# Patient Record
Sex: MALE | Race: WHITE | NOT HISPANIC OR LATINO | URBAN - METROPOLITAN AREA
[De-identification: names, ages, dates, MRNs, and addresses within clinical notes are randomized per-mention and may not be internally consistent; named-entity substitution may affect disease eponyms.]

---

## 2017-03-20 ENCOUNTER — OFFICE VISIT - RIVER FALLS (OUTPATIENT)
Dept: FAMILY MEDICINE | Facility: CLINIC | Age: 72
End: 2017-03-20

## 2017-03-20 ASSESSMENT — MIFFLIN-ST. JEOR: SCORE: 1485.61

## 2017-04-13 ENCOUNTER — OFFICE VISIT - RIVER FALLS (OUTPATIENT)
Dept: FAMILY MEDICINE | Facility: CLINIC | Age: 72
End: 2017-04-13

## 2017-04-13 ASSESSMENT — MIFFLIN-ST. JEOR: SCORE: 1481.08

## 2017-06-08 ENCOUNTER — OFFICE VISIT - RIVER FALLS (OUTPATIENT)
Dept: FAMILY MEDICINE | Facility: CLINIC | Age: 72
End: 2017-06-08

## 2017-06-08 ASSESSMENT — MIFFLIN-ST. JEOR: SCORE: 1467.47

## 2017-08-31 ENCOUNTER — OFFICE VISIT - RIVER FALLS (OUTPATIENT)
Dept: FAMILY MEDICINE | Facility: CLINIC | Age: 72
End: 2017-08-31

## 2017-08-31 ASSESSMENT — MIFFLIN-ST. JEOR: SCORE: 1467.47

## 2017-09-30 ENCOUNTER — AMBULATORY - RIVER FALLS (OUTPATIENT)
Dept: FAMILY MEDICINE | Facility: CLINIC | Age: 72
End: 2017-09-30

## 2017-10-02 LAB
CHOLEST SERPL-MCNC: 163 MG/DL
CHOLEST/HDLC SERPL: 2.7 {RATIO}
CREAT SERPL-MCNC: 0.8 MG/DL (ref 0.7–1.18)
GLUCOSE BLD-MCNC: 109 MG/DL (ref 65–99)
HDLC SERPL-MCNC: 61 MG/DL
LDLC SERPL CALC-MCNC: 86 MG/DL
NONHDLC SERPL-MCNC: 102 MG/DL
PSA SERPL-MCNC: 0.4 NG/ML
TRIGL SERPL-MCNC: 73 MG/DL

## 2017-10-27 ENCOUNTER — OFFICE VISIT - RIVER FALLS (OUTPATIENT)
Dept: FAMILY MEDICINE | Facility: CLINIC | Age: 72
End: 2017-10-27

## 2017-10-27 ASSESSMENT — MIFFLIN-ST. JEOR: SCORE: 1481.08

## 2017-11-07 ENCOUNTER — OFFICE VISIT - RIVER FALLS (OUTPATIENT)
Dept: FAMILY MEDICINE | Facility: CLINIC | Age: 72
End: 2017-11-07

## 2017-11-07 ASSESSMENT — MIFFLIN-ST. JEOR: SCORE: 1482.89

## 2017-11-27 ENCOUNTER — OFFICE VISIT - RIVER FALLS (OUTPATIENT)
Dept: FAMILY MEDICINE | Facility: CLINIC | Age: 72
End: 2017-11-27

## 2017-11-27 ASSESSMENT — MIFFLIN-ST. JEOR: SCORE: 1476.54

## 2018-01-25 ENCOUNTER — OFFICE VISIT - RIVER FALLS (OUTPATIENT)
Dept: FAMILY MEDICINE | Facility: CLINIC | Age: 73
End: 2018-01-25

## 2018-01-25 ASSESSMENT — MIFFLIN-ST. JEOR: SCORE: 1494.69

## 2018-03-23 ENCOUNTER — OFFICE VISIT - RIVER FALLS (OUTPATIENT)
Dept: FAMILY MEDICINE | Facility: CLINIC | Age: 73
End: 2018-03-23

## 2018-05-24 ENCOUNTER — OFFICE VISIT - RIVER FALLS (OUTPATIENT)
Dept: FAMILY MEDICINE | Facility: CLINIC | Age: 73
End: 2018-05-24

## 2018-05-24 ASSESSMENT — MIFFLIN-ST. JEOR: SCORE: 1476.54

## 2018-06-02 ENCOUNTER — COMMUNICATION - RIVER FALLS (OUTPATIENT)
Dept: FAMILY MEDICINE | Facility: CLINIC | Age: 73
End: 2018-06-02

## 2018-06-02 ENCOUNTER — AMBULATORY - RIVER FALLS (OUTPATIENT)
Dept: FAMILY MEDICINE | Facility: CLINIC | Age: 73
End: 2018-06-02

## 2018-06-03 LAB
CHOLEST SERPL-MCNC: 159 MG/DL
CHOLEST/HDLC SERPL: 3.4 {RATIO}
CREAT SERPL-MCNC: 0.76 MG/DL (ref 0.7–1.18)
GLUCOSE BLD-MCNC: 102 MG/DL (ref 65–99)
HDLC SERPL-MCNC: 47 MG/DL
LDLC SERPL CALC-MCNC: 91 MG/DL
NONHDLC SERPL-MCNC: 112 MG/DL
PSA SERPL-MCNC: 0.4 NG/ML
TRIGL SERPL-MCNC: 112 MG/DL

## 2018-06-08 ENCOUNTER — OFFICE VISIT - RIVER FALLS (OUTPATIENT)
Dept: FAMILY MEDICINE | Facility: CLINIC | Age: 73
End: 2018-06-08

## 2018-06-08 ASSESSMENT — MIFFLIN-ST. JEOR: SCORE: 1467.47

## 2018-08-21 ENCOUNTER — OFFICE VISIT - RIVER FALLS (OUTPATIENT)
Dept: FAMILY MEDICINE | Facility: CLINIC | Age: 73
End: 2018-08-21

## 2018-08-21 ASSESSMENT — MIFFLIN-ST. JEOR: SCORE: 1472.01

## 2018-10-16 ENCOUNTER — OFFICE VISIT - RIVER FALLS (OUTPATIENT)
Dept: FAMILY MEDICINE | Facility: CLINIC | Age: 73
End: 2018-10-16

## 2018-10-16 ASSESSMENT — MIFFLIN-ST. JEOR: SCORE: 1449.33

## 2018-12-14 ENCOUNTER — OFFICE VISIT - RIVER FALLS (OUTPATIENT)
Dept: FAMILY MEDICINE | Facility: CLINIC | Age: 73
End: 2018-12-14

## 2018-12-14 ASSESSMENT — MIFFLIN-ST. JEOR: SCORE: 1472.01

## 2019-03-19 ENCOUNTER — OFFICE VISIT - RIVER FALLS (OUTPATIENT)
Dept: FAMILY MEDICINE | Facility: CLINIC | Age: 74
End: 2019-03-19

## 2019-03-19 ASSESSMENT — MIFFLIN-ST. JEOR: SCORE: 1453.86

## 2019-05-13 ENCOUNTER — COMMUNICATION - RIVER FALLS (OUTPATIENT)
Dept: FAMILY MEDICINE | Facility: CLINIC | Age: 74
End: 2019-05-13

## 2019-06-18 ENCOUNTER — OFFICE VISIT - RIVER FALLS (OUTPATIENT)
Dept: FAMILY MEDICINE | Facility: CLINIC | Age: 74
End: 2019-06-18

## 2019-06-18 ASSESSMENT — MIFFLIN-ST. JEOR: SCORE: 1444.79

## 2019-06-20 ENCOUNTER — COMMUNICATION - RIVER FALLS (OUTPATIENT)
Dept: FAMILY MEDICINE | Facility: CLINIC | Age: 74
End: 2019-06-20

## 2019-06-20 LAB
AMPHETAMINE - HISTORICAL: ABNORMAL
AMPHETAMINES UR QL: NEGATIVE NG/ML
BARBITURATE URINE - HISTORICAL: ABNORMAL
BARBITURATES UR QL SCN: NEGATIVE NG/ML
BENZODIAZ UR QL SCN: NEGATIVE NG/ML
BENZODIAZEPINES: ABNORMAL
COCAINE METAB URINE - HISTORICAL: NEGATIVE NG/ML
COCAINE METABOLITE: ABNORMAL
CODEINE CONFIRM URINE: NEGATIVE NG/ML
CODEINE: ABNORMAL
COMMENTS: ABNORMAL
CREAT UR-MCNC: 180.5 MG/DL
HYDROCODONE - HISTORICAL: ABNORMAL
HYDROCODONE UR CFM-MCNC: 1886 NG/ML
HYDROMORPHONE UR QL: 831 NG/ML
HYDROMORPHONE: ABNORMAL
METHADONE URINE - HISTORICAL: NEGATIVE NG/ML
METHADONE: ABNORMAL
MORPHINE URINE: NEGATIVE NG/ML
MORPHINE: ABNORMAL
NORHYDROCODONE - HISTORICAL: 3555 NG/ML
NORHYDROCODONE - HISTORICAL: ABNORMAL
OPIATES UR QL SCN: POSITIVE NG/ML
OXIDANTS URINE: NEGATIVE MCG/ML
OXYCODONE URINE - HISTORICAL: NEGATIVE NG/ML
OXYCODONE: ABNORMAL
PCP (PHENCYCLIDINE) - HISTORICAL: ABNORMAL
PCP UR QL SCN: NEGATIVE NG/ML
PH UR STRIP: 5.2 [PH] (ref 4.5–9)
THC 50 URINE - HISTORICAL: NEGATIVE NG/ML
THC METABOLITE: ABNORMAL

## 2019-08-15 ENCOUNTER — OFFICE VISIT - RIVER FALLS (OUTPATIENT)
Dept: FAMILY MEDICINE | Facility: CLINIC | Age: 74
End: 2019-08-15

## 2019-08-23 ENCOUNTER — AMBULATORY - RIVER FALLS (OUTPATIENT)
Dept: FAMILY MEDICINE | Facility: CLINIC | Age: 74
End: 2019-08-23

## 2019-08-24 LAB
A/G RATIO - HISTORICAL: 1.4 (ref 1–2.5)
ALBUMIN SERPL-MCNC: 4 GM/DL (ref 3.6–5.1)
ALP SERPL-CCNC: 83 UNIT/L (ref 40–115)
ALT SERPL W P-5'-P-CCNC: 14 UNIT/L (ref 9–46)
AST SERPL W P-5'-P-CCNC: 22 UNIT/L (ref 10–35)
BILIRUB SERPL-MCNC: 0.9 MG/DL (ref 0.2–1.2)
BUN SERPL-MCNC: 13 MG/DL (ref 7–25)
BUN/CREAT RATIO - HISTORICAL: ABNORMAL (ref 6–22)
CALCIUM SERPL-MCNC: 9.6 MG/DL (ref 8.6–10.3)
CHLORIDE BLD-SCNC: 102 MMOL/L (ref 98–110)
CHOLEST SERPL-MCNC: 172 MG/DL
CHOLEST/HDLC SERPL: 2.7 {RATIO}
CO2 SERPL-SCNC: 31 MMOL/L (ref 20–32)
CREAT SERPL-MCNC: 0.86 MG/DL (ref 0.7–1.18)
EGFRCR SERPLBLD CKD-EPI 2021: 87 ML/MIN/1.73M2
ERYTHROCYTE [DISTWIDTH] IN BLOOD BY AUTOMATED COUNT: 13.6 % (ref 11–15)
FERRITIN SERPL-MCNC: 21 NG/ML (ref 24–380)
GLOBULIN: 2.9 (ref 1.9–3.7)
GLUCOSE BLD-MCNC: 103 MG/DL (ref 65–99)
HCT VFR BLD AUTO: 42.1 % (ref 38.5–50)
HDLC SERPL-MCNC: 63 MG/DL
HGB BLD-MCNC: 13.6 GM/DL (ref 13.2–17.1)
LDLC SERPL CALC-MCNC: 93 MG/DL
MCH RBC QN AUTO: 27 PG (ref 27–33)
MCHC RBC AUTO-ENTMCNC: 32.3 GM/DL (ref 32–36)
MCV RBC AUTO: 83.7 FL (ref 80–100)
NONHDLC SERPL-MCNC: 109 MG/DL
PLATELET # BLD AUTO: 285 10*3/UL (ref 140–400)
PMV BLD: 9.5 FL (ref 7.5–12.5)
POTASSIUM BLD-SCNC: 4.9 MMOL/L (ref 3.5–5.3)
PROT SERPL-MCNC: 6.9 GM/DL (ref 6.1–8.1)
PSA SERPL-MCNC: 0.4 NG/ML
RBC # BLD AUTO: 5.03 10*6/UL (ref 4.2–5.8)
SODIUM SERPL-SCNC: 139 MMOL/L (ref 135–146)
TRIGL SERPL-MCNC: 70 MG/DL
TSH SERPL DL<=0.005 MIU/L-ACNC: 2.14 MIU/L (ref 0.4–4.5)
WBC # BLD AUTO: 4.8 10*3/UL (ref 3.8–10.8)

## 2019-08-25 ENCOUNTER — COMMUNICATION - RIVER FALLS (OUTPATIENT)
Dept: FAMILY MEDICINE | Facility: CLINIC | Age: 74
End: 2019-08-25

## 2019-10-10 ENCOUNTER — OFFICE VISIT - RIVER FALLS (OUTPATIENT)
Dept: FAMILY MEDICINE | Facility: CLINIC | Age: 74
End: 2019-10-10

## 2019-10-10 ASSESSMENT — MIFFLIN-ST. JEOR: SCORE: 1440.25

## 2019-11-04 ENCOUNTER — OFFICE VISIT - RIVER FALLS (OUTPATIENT)
Dept: FAMILY MEDICINE | Facility: CLINIC | Age: 74
End: 2019-11-04

## 2019-11-04 ASSESSMENT — MIFFLIN-ST. JEOR: SCORE: 1449.33

## 2019-12-12 ENCOUNTER — OFFICE VISIT - RIVER FALLS (OUTPATIENT)
Dept: FAMILY MEDICINE | Facility: CLINIC | Age: 74
End: 2019-12-12

## 2019-12-12 ASSESSMENT — MIFFLIN-ST. JEOR: SCORE: 1458.4

## 2020-02-06 ENCOUNTER — OFFICE VISIT - RIVER FALLS (OUTPATIENT)
Dept: FAMILY MEDICINE | Facility: CLINIC | Age: 75
End: 2020-02-06

## 2020-02-06 ASSESSMENT — MIFFLIN-ST. JEOR: SCORE: 1472.37

## 2020-04-07 ENCOUNTER — OFFICE VISIT - RIVER FALLS (OUTPATIENT)
Dept: FAMILY MEDICINE | Facility: CLINIC | Age: 75
End: 2020-04-07

## 2020-06-11 ENCOUNTER — OFFICE VISIT - RIVER FALLS (OUTPATIENT)
Dept: FAMILY MEDICINE | Facility: CLINIC | Age: 75
End: 2020-06-11

## 2020-06-11 ENCOUNTER — AMBULATORY - RIVER FALLS (OUTPATIENT)
Dept: FAMILY MEDICINE | Facility: CLINIC | Age: 75
End: 2020-06-11

## 2020-06-11 ASSESSMENT — MIFFLIN-ST. JEOR: SCORE: 1477.45

## 2020-06-12 ENCOUNTER — COMMUNICATION - RIVER FALLS (OUTPATIENT)
Dept: FAMILY MEDICINE | Facility: CLINIC | Age: 75
End: 2020-06-12

## 2020-06-12 LAB
A/G RATIO - HISTORICAL: 1.1 (ref 1–2.5)
ALBUMIN SERPL-MCNC: 3.9 GM/DL (ref 3.6–5.1)
ALP SERPL-CCNC: 97 UNIT/L (ref 35–144)
ALT SERPL W P-5'-P-CCNC: 12 UNIT/L (ref 9–46)
AST SERPL W P-5'-P-CCNC: 17 UNIT/L (ref 10–35)
BILIRUB SERPL-MCNC: 0.8 MG/DL (ref 0.2–1.2)
BUN SERPL-MCNC: 14 MG/DL (ref 7–25)
BUN/CREAT RATIO - HISTORICAL: ABNORMAL (ref 6–22)
CALCIUM SERPL-MCNC: 9.7 MG/DL (ref 8.6–10.3)
CHLORIDE BLD-SCNC: 101 MMOL/L (ref 98–110)
CHOLEST SERPL-MCNC: 180 MG/DL
CHOLEST/HDLC SERPL: 3 {RATIO}
CO2 SERPL-SCNC: 30 MMOL/L (ref 20–32)
CREAT SERPL-MCNC: 0.79 MG/DL (ref 0.7–1.18)
EGFRCR SERPLBLD CKD-EPI 2021: 90 ML/MIN/1.73M2
ERYTHROCYTE [DISTWIDTH] IN BLOOD BY AUTOMATED COUNT: 12.7 % (ref 11–15)
FERRITIN SERPL-MCNC: 23 NG/ML (ref 24–380)
GLOBULIN: 3.4 (ref 1.9–3.7)
GLUCOSE BLD-MCNC: 131 MG/DL (ref 65–99)
HCT VFR BLD AUTO: 43 % (ref 38.5–50)
HDLC SERPL-MCNC: 61 MG/DL
HGB BLD-MCNC: 14 GM/DL (ref 13.2–17.1)
LDLC SERPL CALC-MCNC: 95 MG/DL
MCH RBC QN AUTO: 28.2 PG (ref 27–33)
MCHC RBC AUTO-ENTMCNC: 32.6 GM/DL (ref 32–36)
MCV RBC AUTO: 86.5 FL (ref 80–100)
NONHDLC SERPL-MCNC: 119 MG/DL
PLATELET # BLD AUTO: 309 10*3/UL (ref 140–400)
PMV BLD: 9.1 FL (ref 7.5–12.5)
POTASSIUM BLD-SCNC: 4.2 MMOL/L (ref 3.5–5.3)
PROT SERPL-MCNC: 7.3 GM/DL (ref 6.1–8.1)
PSA SERPL-MCNC: 0.3 NG/ML
RBC # BLD AUTO: 4.97 10*6/UL (ref 4.2–5.8)
SODIUM SERPL-SCNC: 138 MMOL/L (ref 135–146)
TRIGL SERPL-MCNC: 139 MG/DL
TSH SERPL DL<=0.005 MIU/L-ACNC: 1.25 MIU/L (ref 0.4–4.5)
WBC # BLD AUTO: 5.3 10*3/UL (ref 3.8–10.8)

## 2020-07-17 ENCOUNTER — OFFICE VISIT - RIVER FALLS (OUTPATIENT)
Dept: FAMILY MEDICINE | Facility: CLINIC | Age: 75
End: 2020-07-17

## 2020-07-21 ENCOUNTER — AMBULATORY - HEALTHEAST (OUTPATIENT)
Dept: SURGERY | Facility: TELEHEALTH | Age: 75
End: 2020-07-21

## 2020-07-21 DIAGNOSIS — K40.90 INGUINAL HERNIA, RIGHT: ICD-10-CM

## 2020-07-22 ENCOUNTER — OFFICE VISIT - RIVER FALLS (OUTPATIENT)
Dept: FAMILY MEDICINE | Facility: CLINIC | Age: 75
End: 2020-07-22

## 2020-07-22 ENCOUNTER — AMBULATORY - RIVER FALLS (OUTPATIENT)
Dept: FAMILY MEDICINE | Facility: CLINIC | Age: 75
End: 2020-07-22

## 2020-07-30 RX ORDER — ESCITALOPRAM OXALATE 10 MG/1
10 TABLET ORAL DAILY
Status: SHIPPED | COMMUNITY
Start: 2020-07-30

## 2020-07-30 RX ORDER — HYDROCODONE BITARTRATE AND ACETAMINOPHEN 5; 325 MG/1; MG/1
1 TABLET ORAL EVERY 4 HOURS PRN
Status: SHIPPED | COMMUNITY
Start: 2020-07-30

## 2020-07-30 RX ORDER — FERROUS SULFATE 325(65) MG
1 TABLET ORAL
Status: SHIPPED | COMMUNITY
Start: 2020-07-30

## 2020-07-30 RX ORDER — ELETRIPTAN HYDROBROMIDE 40 MG/1
40 TABLET, FILM COATED ORAL PRN
Status: SHIPPED | COMMUNITY
Start: 2020-07-30

## 2020-08-03 ENCOUNTER — OFFICE VISIT - HEALTHEAST (OUTPATIENT)
Dept: SURGERY | Facility: CLINIC | Age: 75
End: 2020-08-03

## 2020-08-03 ENCOUNTER — SURGERY - HEALTHEAST (OUTPATIENT)
Dept: SURGERY | Facility: CLINIC | Age: 75
End: 2020-08-03

## 2020-08-03 ENCOUNTER — AMBULATORY - HEALTHEAST (OUTPATIENT)
Dept: SURGERY | Facility: CLINIC | Age: 75
End: 2020-08-03

## 2020-08-03 DIAGNOSIS — K40.91 UNILATERAL RECURRENT INGUINAL HERNIA WITHOUT OBSTRUCTION OR GANGRENE: ICD-10-CM

## 2020-08-03 DIAGNOSIS — Z11.59 ENCOUNTER FOR SCREENING FOR OTHER VIRAL DISEASES: ICD-10-CM

## 2020-08-03 DIAGNOSIS — K40.90 UNILATERAL INGUINAL HERNIA WITHOUT OBSTRUCTION OR GANGRENE, RECURRENCE NOT SPECIFIED: ICD-10-CM

## 2020-08-04 LAB — SARS-COV-2 RNA SPEC QL NAA+PROBE: NOT DETECTED

## 2020-08-05 ENCOUNTER — OFFICE VISIT - RIVER FALLS (OUTPATIENT)
Dept: FAMILY MEDICINE | Facility: CLINIC | Age: 75
End: 2020-08-05

## 2020-08-05 ASSESSMENT — MIFFLIN-ST. JEOR
SCORE: 1462.49
SCORE: 1457.49

## 2020-08-10 ENCOUNTER — COMMUNICATION - RIVER FALLS (OUTPATIENT)
Dept: FAMILY MEDICINE | Facility: CLINIC | Age: 75
End: 2020-08-10

## 2020-08-10 ENCOUNTER — AMBULATORY - HEALTHEAST (OUTPATIENT)
Dept: FAMILY MEDICINE | Facility: CLINIC | Age: 75
End: 2020-08-10

## 2020-08-10 DIAGNOSIS — Z11.59 ENCOUNTER FOR SCREENING FOR OTHER VIRAL DISEASES: ICD-10-CM

## 2020-08-12 ENCOUNTER — COMMUNICATION - HEALTHEAST (OUTPATIENT)
Dept: SCHEDULING | Facility: CLINIC | Age: 75
End: 2020-08-12

## 2020-08-12 ENCOUNTER — ANESTHESIA - HEALTHEAST (OUTPATIENT)
Dept: SURGERY | Facility: HOSPITAL | Age: 75
End: 2020-08-12

## 2020-08-13 ENCOUNTER — SURGERY - HEALTHEAST (OUTPATIENT)
Dept: SURGERY | Facility: HOSPITAL | Age: 75
End: 2020-08-13

## 2020-08-27 ENCOUNTER — OFFICE VISIT - HEALTHEAST (OUTPATIENT)
Dept: SURGERY | Facility: CLINIC | Age: 75
End: 2020-08-27

## 2020-08-27 DIAGNOSIS — K40.90 UNILATERAL INGUINAL HERNIA WITHOUT OBSTRUCTION OR GANGRENE, RECURRENCE NOT SPECIFIED: ICD-10-CM

## 2020-09-10 ENCOUNTER — COMMUNICATION - RIVER FALLS (OUTPATIENT)
Dept: FAMILY MEDICINE | Facility: CLINIC | Age: 75
End: 2020-09-10

## 2020-10-09 ENCOUNTER — OFFICE VISIT - RIVER FALLS (OUTPATIENT)
Dept: FAMILY MEDICINE | Facility: CLINIC | Age: 75
End: 2020-10-09

## 2020-10-23 ENCOUNTER — AMBULATORY - RIVER FALLS (OUTPATIENT)
Dept: FAMILY MEDICINE | Facility: CLINIC | Age: 75
End: 2020-10-23

## 2020-12-04 ENCOUNTER — OFFICE VISIT - RIVER FALLS (OUTPATIENT)
Dept: FAMILY MEDICINE | Facility: CLINIC | Age: 75
End: 2020-12-04

## 2020-12-04 ENCOUNTER — TRANSFERRED RECORDS (OUTPATIENT)
Dept: MULTI SPECIALTY CLINIC | Facility: CLINIC | Age: 75
End: 2020-12-04

## 2020-12-04 ASSESSMENT — MIFFLIN-ST. JEOR: SCORE: 1467.47

## 2020-12-06 LAB
CELIAC DISEASE DUAL AG SCREEN: NORMAL
GLUCOSE BLD-MCNC: 108 MG/DL (ref 65–99)
HBA1C MFR BLD: 5.5 %
IMMUNOGLOBULIN A: 249 MG/DL (ref 70–320)
TISSUE TRANSGLUTAMINASE IGA - HISTORICAL: 1 UNIT/ML

## 2020-12-07 ENCOUNTER — COMMUNICATION - RIVER FALLS (OUTPATIENT)
Dept: FAMILY MEDICINE | Facility: CLINIC | Age: 75
End: 2020-12-07

## 2021-02-22 ENCOUNTER — OFFICE VISIT - RIVER FALLS (OUTPATIENT)
Dept: FAMILY MEDICINE | Facility: CLINIC | Age: 76
End: 2021-02-22

## 2021-02-22 ASSESSMENT — MIFFLIN-ST. JEOR: SCORE: 1512.83

## 2021-05-25 ENCOUNTER — OFFICE VISIT - HEALTHEAST (OUTPATIENT)
Dept: BEHAVIORAL HEALTH | Facility: CLINIC | Age: 76
End: 2021-05-25

## 2021-05-25 DIAGNOSIS — F43.21 ADJUSTMENT DISORDER WITH DEPRESSED MOOD: ICD-10-CM

## 2021-05-25 ASSESSMENT — PATIENT HEALTH QUESTIONNAIRE - PHQ9: SUM OF ALL RESPONSES TO PHQ QUESTIONS 1-9: 4

## 2021-06-01 ENCOUNTER — OFFICE VISIT - RIVER FALLS (OUTPATIENT)
Dept: FAMILY MEDICINE | Facility: CLINIC | Age: 76
End: 2021-06-01

## 2021-06-01 ASSESSMENT — MIFFLIN-ST. JEOR: SCORE: 1467.02

## 2021-06-04 VITALS — WEIGHT: 156 LBS

## 2021-06-04 VITALS — WEIGHT: 156.8 LBS | HEIGHT: 70 IN | BODY MASS INDEX: 22.45 KG/M2

## 2021-06-10 NOTE — PROGRESS NOTES
HPI: Pt is here for follow up s/p HERNIORRHAPHY, INGUINAL, ROBOT-ASSISTED, LAPAROSCOPIC, USING DA GISSELLE XI- right  with Dr. Stone on 8/13/2020.   he is doing well.  Pain is well controlled:  Yes. No difficulties with the surgical wound/wounds.  he is eating well and denies fever and chills.        There were no vitals taken for this visit.    EXAM:  GENERAL:Appears well  ABDOMEN:  Soft, +BS  SURGICAL WOUNDS:  Incisions healing well, no induration or drainage.      Assessment/Plan: Doing well after surgery and should follow up as needed.    Jovany Moya PA-C  [phone]  General Surgery

## 2021-06-10 NOTE — ANESTHESIA PREPROCEDURE EVALUATION
Anesthesia Evaluation      Patient summary reviewed   No history of anesthetic complications     Airway   Mallampati: I  Neck ROM: full   Pulmonary - normal exam   (+) asthma  sleep apnea on CPAP, ,                          Cardiovascular - negative ROS and normal exam  Exercise tolerance: > or = 4 METS   Neuro/Psych    (+) neuromuscular disease,      Endo/Other       GI/Hepatic/Renal    (+) GERD intermittent,        Other findings: DAISY, has CPAP but does not always use it. H/o RAD, chronic rhinitis.  Fibromyalgia.  Spinal fusion, HAs, varicose veins, remote PUD (NSAID induced), IBS, BPH.  Covid negative 8/10/20.        Dental - normal exam                        Anesthesia Plan  Planned anesthetic: general endotracheal  Ketamine after induction, toradol if ok with surgeon.  ASA 3   Induction: intravenous   Anesthetic plan and risks discussed with: patient  Anesthesia plan special considerations: antiemetics,   Post-op plan: routine recovery

## 2021-06-10 NOTE — ANESTHESIA CARE TRANSFER NOTE
Last vitals:   Vitals:    08/13/20 1720   BP: 162/86   Pulse: 82   Resp: 15   Temp: 37.4  C (99.3  F)   SpO2: 97%     Patient's level of consciousness is drowsy  Spontaneous respirations: yes  Maintains airway independently: yes  Dentition unchanged: yes  Oropharynx: oropharynx clear of all foreign objects    QCDR Measures:  ASA# 20 - Surgical Safety Checklist: WHO surgical safety checklist completed prior to induction    PQRS# 430 - Adult PONV Prevention: 4558F - Pt received => 2 anti-emetic agents (different classes) preop & intraop  ASA# 8 - Peds PONV Prevention: NA - Not pediatric patient, not GA or 2 or more risk factors NOT present  PQRS# 424 - Griselda-op Temp Management: 4559F - At least one body temp DOCUMENTED => 35.5C or 95.9F within required timeframe  PQRS# 426 - PACU Transfer Protocol: - Transfer of care checklist used  ASA# 14 - Acute Post-op Pain: ASA14B - Patient did NOT experience pain >= 7 out of 10

## 2021-06-10 NOTE — PROGRESS NOTES
HPI:  Fer Prince is a 74 y.o. male who was referred to me by Alexander Higginbotham MD for an inguinal hernia. He  presents today with complaints of a right inguinal bulge for 6 months.  He is noted that the bulge began after heavy lifting at work as he is a .  He denies any fevers, chills, nausea or vomiting.    Allergies:Amitriptyline; Indomethacin; and Paroxetine    Past Medical History:   Diagnosis Date     Sleep apnea     Uses CPAP       Past Surgical History:   Procedure Laterality Date     CHOLECYSTECTOMY  2002     COLONOSCOPY  2004, 2009     ESOPHAGOGASTRODUODENOSCOPY  1989     SPINAL FUSION  1995       CURRENT MEDS:  Current Outpatient Medications   Medication Sig Dispense Refill     eletriptan (RELPAX) 40 MG tablet Take 40 mg by mouth as needed. may repeat in 2 hours if necessary       escitalopram oxalate (LEXAPRO) 10 MG tablet Take 10 mg by mouth daily.       ferrous sulfate 325 (65 FE) MG tablet Take 1 tablet by mouth daily with breakfast.       HYDROcodone-acetaminophen 5-325 mg per tablet Take 1 tablet by mouth every 4 (four) hours as needed.       multivitamin (ONE A DAY) per tablet Take by mouth.       triamcinolone (NASACORT) 55 mcg nasal inhaler Apply 2 sprays into each nostril daily.       No current facility-administered medications for this visit.        Family history-reviewed and noncontributory to the current admission     reports that he has never smoked. He has never used smokeless tobacco. He reports current alcohol use.    Review of Systems -   The 12 system review is within normal limits except for as mentioned above.  General ROS: No complaints or constitutional symptoms  Ophthalmic ROS: No complaints of visual changes  Skin: No complaints or symptoms   Endocrine: No complaints or symptoms  Hematologic/Lymphatic: No symptoms or complaints  Psychiatric: No symptoms or complaints  Respiratory ROS: no cough, shortness of breath, or wheezing  Cardiovascular ROS: no chest pain or  dyspnea on exertion  Gastrointestinal ROS: As per HPI  Genito-Urinary ROS: no dysuria, trouble voiding, or hematuria  Musculoskeletal ROS: no joint or muscle pain  Neurological ROS: no TIA or stroke symptoms    Wt 156 lb (70.8 kg)   There is no height or weight on file to calculate BMI.    EXAM:  GENERAL: Well developed male, No acute distress, pleasant and conversant   EYES: Pupils equal, round and reactive, no scleral icterus  CARDIAC: Regular rate and rhythm, no obvious murmurs noted  CHEST/LUNG: Clear to ascultation bilaterally, No ronchi, No wheezes  ABDOMEN: Soft, palpable right inguinal hernia reducible, no evidence of a left inguinal hernia  SKIN: Pink, warm and dry, no obvious rashes or lesions   NEURO:No focal deficits, ambulatory  MUSCULOSKELETAL:No obvious deformities, no swelling, normal appearing        Assessment/Plan:   Fer Prince is a 74 y.o. male with a right inguinal hernia.  I have discussed the pathophysiology of inguinal hernias at length as well as the  surgical and non-operative management strategies.      The risks of Robotic, Laparoscopic and open inguinal hernia  surgery were explained in detail which include, but are not limited to, bleeding, infection of the mesh, recurrence of the hernia, chronic pain, poor cosmesis, the need for reoperative intervention, the possibility of conversion from a laparoscopic approach to an open approach, subcutaneous emphysema, injury to vital structures,  blood clots, heart attack, stroke and death.  Additionally, the risks of observation were also discussed in detail which include, but are not limited to, chronic pain, enlargement of the hernia, incarceration, strangulation and death.      He understands everything that was discussed and has consented to proceed with surgery.   We will plan on scheduling a robotic right possible left inguinal hernia repair at his desired date.       Rich Stone D.O. [facility]  [phone]  [facility] Department of Surgery

## 2021-06-10 NOTE — ANESTHESIA POSTPROCEDURE EVALUATION
Patient: Fer Prince  Procedure(s):  HERNIORRHAPHY, INGUINAL, ROBOT-ASSISTED, LAPAROSCOPIC, USING DA GISSELLE XI- right (Right)  Anesthesia type: general    Patient location: PACU  Last vitals:   Vitals Value Taken Time   /95 8/13/2020  6:00 PM   Temp 37.4  C (99.3  F) 8/13/2020  5:20 PM   Pulse 78 8/13/2020  6:02 PM   Resp 36 8/13/2020  5:48 PM   SpO2 99 % 8/13/2020  6:02 PM   Vitals shown include unvalidated device data.  Post vital signs: stable  Level of consciousness: very alert, conversing and soon is getting dressed to leave.  Post-anesthesia pain: pain controlled  Post-anesthesia nausea and vomiting: no  Pulmonary: room air  Cardiovascular: stable and blood pressure at baseline  Hydration: adequate  Anesthetic events: no    QCDR Measures:  ASA# 11 - Griselda-op Cardiac Arrest: ASA11B - Patient did NOT experience unanticipated cardiac arrest  ASA# 12 - Griselda-op Mortality Rate: ASA12B - Patient did NOT die  ASA# 13 - PACU Re-Intubation Rate: ASA13B - Patient did NOT require a new airway mgmt  ASA# 10 - Composite Anes Safety: ASA10A - No serious adverse event    Additional Notes:

## 2021-06-16 PROBLEM — K40.90 UNILATERAL INGUINAL HERNIA WITHOUT OBSTRUCTION OR GANGRENE, RECURRENCE NOT SPECIFIED: Status: ACTIVE | Noted: 2020-08-03

## 2021-06-17 NOTE — PROGRESS NOTES
"Mental Health Psychotherapy Telephone Note    Patient: Fer Prince    : 1945 MRN: [id number]    Completed a 2 point identification verification: patient verbalized full name and date of birth.     Date: 21  Start time: 0800   Stop Time: 852   Session # 1    The patient has been notified of the following:   \"We have found that certain health care needs can be provided without the need for a face to face visit.  This service lets us provide the care you need with a phone conversation.  I will have full access to your [place] medical record during this entire phone call.   I will be taking notes for your medical record. Since this is like an office visit, we will bill your insurance company for this service.  There are potential benefits and risks of telephone visits (e.g. limits to patient confidentiality) that differ from in-person visits.?  Confidentiality still applies for telephone services, and nobody will record the visit.  It is important to be in a quiet, private space that is free of distractions (including cell phone or other devices) during the visit.?? If during the course of the call I believe a telephone visit is not appropriate, you will not be charged for this service\"  Consent has been obtained for this service by care team member: Yes, per verbal agreement   Session Type: Patient is participating in a telemedicine phone visit     Those present for this session: (Patient and therapist)      No chief complaint on file.    Patient is a 75-year-old man who was self-referred for psychotherapy.  Patient indicated his appointment was originally set up on 2021 although, was told there was no availability until 2021.  Patient stated, that his wife  of complications of cancer 2020.  They have been  for 49 years.  They have 5 children and 7 grandchildren.  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  He stated " "that he has deep face and has met on several occasions with a  from his [facility] as well as a grief counselor from the local  home.  His children have been very supportive and involved and concerned that he has additional support through the grieving process.    New symptoms or complaints: Grief, sadness, guilt, loneliness, uncertainty regarding future.    Functional Impairment:   Personal: 4  Family: 3  Work: 1  Social:3          ASSESSMENT: Current Emotional / Mental Status (status of significant symptoms):              Risk status (Self / Other harm or suicidal ideation)              Patient denies any risk issues               Patient denies current or recent suicidal ideation or behaviors.              Patient denies current or recent homicidal ideation or behaviors.              Patient denies current or recent self injurious behavior or ideation.              Patient denies other safety concerns.                        Patient               Recommended that patient call 911 or go to the local ED should there be a change in any of these risk factors.                Attitude:                                   Cooperative               Orientation:                             Oriented x3              Speech                                    Clear                           Rate / Production:       Normal/ Responsive Normal                           Volume:                       Normal               Mood:                                      Normal              Thought Content:                    Clear               Thought Form:                        Coherent  Logical               Insight:                                     Good     PHQ-9 scoring 4 indicating mild depression    Patient's impression of their current status: Patient stated, \"I am doing much better now than initially and I have good support in place.\"    Therapist impression of patients current state: The patient presented for an " initial session with provider.  Patient is a 75-year-old male who was self-referred referred to engage in individual psychotherapy to address symptoms of depression and grief and loss issues. Patient appeared cooperative and open-minded throughout the intake and easily engaged in dialogue.  Therapist and patient verbally reviewed consent to privacy policy. Patient reported understanding and provided verbal consent.  The patient has not engaged in outpatient psychotherapy services in the community so there is no diagnostic assessment on file available.  The patient completed the following questionnaires today PHQ-9,  No concerns about patient safety or the safety of others per assessment today.  Therapist will review patient's further history, mail out intake form and follow-up in order to complete a standard diagnostic assessment.  Goals for treatment will be established after the second or third follow-up session with this provider.    Type of psychotherapeutic technique provided: Insight oriented and Solution-focused    Progress toward short term goals: Not yet established    Review of long term goals: Not yet established     Diagnosis:  Anxiety disorder with Depressed mood    Plan and Follow up:   1.  Patient will continue maintaining contact with his [facility]   2.  Patient will continue obtaining counseling and guidance through the local [facility] home counselor.  3.  Maintain strong family support.  4.  Obtain additional grief and loss support information available through Internet and other resources.  5.  Patient will make contact with therapist if need arises in the future.    Discharge Criteria/Planning: Patient will continue with follow-up until therapy can be discontinued without return of signs and symptoms.    I have reviewed the note as documented above.  This accurately captures the substance of my conversation with the patient.  As the provider I attest to compliance with applicable laws and  regulations related to telemedicine.  Vikki Stein LIC  5/25/21

## 2021-06-20 NOTE — LETTER
Letter by Jovany Moya PA-C at      Author: Jovany Moya PA-C Service: -- Author Type: --    Filed:  Encounter Date: 8/27/2020 Status: (Other)       August 27, 2020     Patient: Fer Prince   YOB: 1945   Date of Visit: 8/27/2020       To Whom It May Concern:    It is my medical opinion that Fer Prince may return to work on 9/14/2020 with 20 pound lifting restriction. May resume normal work duties as tolerated on 9/28/2020. He may be [place] at anytime. .    If you have any questions or concerns, please don't hesitate to call.    Sincerely,        Jovany Moya PA-C

## 2021-07-06 ASSESSMENT — PATIENT HEALTH QUESTIONNAIRE - PHQ9: SUM OF ALL RESPONSES TO PHQ QUESTIONS 1-9: 4

## 2022-02-12 VITALS
TEMPERATURE: 97 F | HEART RATE: 76 BPM | HEIGHT: 70 IN | BODY MASS INDEX: 21.9 KG/M2 | TEMPERATURE: 96.7 F | DIASTOLIC BLOOD PRESSURE: 89 MMHG | WEIGHT: 155 LBS | SYSTOLIC BLOOD PRESSURE: 122 MMHG | WEIGHT: 153 LBS | HEIGHT: 70 IN | OXYGEN SATURATION: 98 % | BODY MASS INDEX: 22.19 KG/M2 | HEART RATE: 76 BPM | DIASTOLIC BLOOD PRESSURE: 60 MMHG | SYSTOLIC BLOOD PRESSURE: 158 MMHG

## 2022-02-12 VITALS
BODY MASS INDEX: 22.19 KG/M2 | DIASTOLIC BLOOD PRESSURE: 76 MMHG | WEIGHT: 155 LBS | HEIGHT: 70 IN | SYSTOLIC BLOOD PRESSURE: 132 MMHG | HEART RATE: 73 BPM

## 2022-02-12 VITALS
BODY MASS INDEX: 24.2 KG/M2 | HEART RATE: 77 BPM | SYSTOLIC BLOOD PRESSURE: 124 MMHG | DIASTOLIC BLOOD PRESSURE: 80 MMHG | HEIGHT: 70 IN | OXYGEN SATURATION: 96 % | WEIGHT: 169 LBS | TEMPERATURE: 97.6 F

## 2022-02-12 VITALS
HEIGHT: 70 IN | BODY MASS INDEX: 22.76 KG/M2 | HEIGHT: 70 IN | SYSTOLIC BLOOD PRESSURE: 135 MMHG | HEART RATE: 87 BPM | WEIGHT: 159 LBS | DIASTOLIC BLOOD PRESSURE: 86 MMHG | DIASTOLIC BLOOD PRESSURE: 80 MMHG | WEIGHT: 159 LBS | HEART RATE: 85 BPM | SYSTOLIC BLOOD PRESSURE: 124 MMHG | BODY MASS INDEX: 22.76 KG/M2

## 2022-02-12 VITALS
BODY MASS INDEX: 22.9 KG/M2 | WEIGHT: 160 LBS | HEIGHT: 70 IN | HEART RATE: 77 BPM | SYSTOLIC BLOOD PRESSURE: 134 MMHG | DIASTOLIC BLOOD PRESSURE: 77 MMHG

## 2022-02-12 VITALS
OXYGEN SATURATION: 98 % | DIASTOLIC BLOOD PRESSURE: 62 MMHG | TEMPERATURE: 97.5 F | TEMPERATURE: 97.9 F | HEART RATE: 82 BPM | WEIGHT: 157 LBS | BODY MASS INDEX: 22.48 KG/M2 | HEIGHT: 70 IN | HEART RATE: 69 BPM | SYSTOLIC BLOOD PRESSURE: 112 MMHG | WEIGHT: 160.08 LBS | BODY MASS INDEX: 22.92 KG/M2 | SYSTOLIC BLOOD PRESSURE: 124 MMHG | HEIGHT: 70 IN | DIASTOLIC BLOOD PRESSURE: 78 MMHG

## 2022-02-12 VITALS
HEART RATE: 69 BPM | WEIGHT: 165 LBS | SYSTOLIC BLOOD PRESSURE: 118 MMHG | HEIGHT: 70 IN | DIASTOLIC BLOOD PRESSURE: 74 MMHG | WEIGHT: 161 LBS | BODY MASS INDEX: 23.05 KG/M2 | SYSTOLIC BLOOD PRESSURE: 120 MMHG | TEMPERATURE: 97.2 F | HEART RATE: 88 BPM | BODY MASS INDEX: 23.68 KG/M2 | DIASTOLIC BLOOD PRESSURE: 69 MMHG

## 2022-02-12 VITALS
HEART RATE: 82 BPM | WEIGHT: 160 LBS | BODY MASS INDEX: 22.9 KG/M2 | WEIGHT: 159 LBS | SYSTOLIC BLOOD PRESSURE: 137 MMHG | HEIGHT: 70 IN | BODY MASS INDEX: 22.76 KG/M2 | DIASTOLIC BLOOD PRESSURE: 82 MMHG | HEIGHT: 70 IN | HEART RATE: 71 BPM | DIASTOLIC BLOOD PRESSURE: 78 MMHG | SYSTOLIC BLOOD PRESSURE: 125 MMHG

## 2022-02-12 VITALS
WEIGHT: 162 LBS | BODY MASS INDEX: 23.19 KG/M2 | DIASTOLIC BLOOD PRESSURE: 80 MMHG | BODY MASS INDEX: 23.05 KG/M2 | WEIGHT: 162.4 LBS | HEIGHT: 70 IN | HEART RATE: 85 BPM | HEIGHT: 70 IN | DIASTOLIC BLOOD PRESSURE: 86 MMHG | SYSTOLIC BLOOD PRESSURE: 122 MMHG | SYSTOLIC BLOOD PRESSURE: 151 MMHG | HEIGHT: 70 IN | HEART RATE: 82 BPM | TEMPERATURE: 97.7 F | DIASTOLIC BLOOD PRESSURE: 72 MMHG | BODY MASS INDEX: 23.25 KG/M2 | HEART RATE: 74 BPM | WEIGHT: 161 LBS | TEMPERATURE: 97 F | OXYGEN SATURATION: 97 % | SYSTOLIC BLOOD PRESSURE: 130 MMHG

## 2022-02-12 VITALS
HEART RATE: 77 BPM | SYSTOLIC BLOOD PRESSURE: 116 MMHG | BODY MASS INDEX: 22.33 KG/M2 | HEIGHT: 70 IN | DIASTOLIC BLOOD PRESSURE: 73 MMHG | WEIGHT: 156 LBS

## 2022-02-12 VITALS
SYSTOLIC BLOOD PRESSURE: 120 MMHG | BODY MASS INDEX: 22.1 KG/M2 | DIASTOLIC BLOOD PRESSURE: 70 MMHG | TEMPERATURE: 97.3 F | HEART RATE: 62 BPM | SYSTOLIC BLOOD PRESSURE: 122 MMHG | DIASTOLIC BLOOD PRESSURE: 70 MMHG | WEIGHT: 154 LBS | BODY MASS INDEX: 22.05 KG/M2 | HEIGHT: 70 IN | TEMPERATURE: 97.2 F | WEIGHT: 154 LBS | HEART RATE: 68 BPM

## 2022-02-12 VITALS
WEIGHT: 156.8 LBS | BODY MASS INDEX: 23.13 KG/M2 | DIASTOLIC BLOOD PRESSURE: 68 MMHG | OXYGEN SATURATION: 95 % | BODY MASS INDEX: 23.08 KG/M2 | HEIGHT: 70 IN | SYSTOLIC BLOOD PRESSURE: 145 MMHG | WEIGHT: 161.2 LBS | HEART RATE: 76 BPM | HEIGHT: 70 IN | BODY MASS INDEX: 23.13 KG/M2 | DIASTOLIC BLOOD PRESSURE: 80 MMHG | BODY MASS INDEX: 22.45 KG/M2 | HEIGHT: 70 IN | HEIGHT: 70 IN | TEMPERATURE: 97.4 F | HEART RATE: 83 BPM | SYSTOLIC BLOOD PRESSURE: 132 MMHG

## 2022-02-12 VITALS
HEIGHT: 70 IN | BODY MASS INDEX: 23.19 KG/M2 | WEIGHT: 162 LBS | HEART RATE: 73 BPM | BODY MASS INDEX: 23.34 KG/M2 | HEIGHT: 70 IN | WEIGHT: 163 LBS | DIASTOLIC BLOOD PRESSURE: 85 MMHG | DIASTOLIC BLOOD PRESSURE: 79 MMHG | HEART RATE: 75 BPM | SYSTOLIC BLOOD PRESSURE: 133 MMHG | SYSTOLIC BLOOD PRESSURE: 132 MMHG

## 2022-02-12 VITALS — BODY MASS INDEX: 22.5 KG/M2 | HEIGHT: 70 IN

## 2022-02-12 VITALS — BODY MASS INDEX: 22.1 KG/M2 | HEIGHT: 70 IN

## 2022-02-12 VITALS
DIASTOLIC BLOOD PRESSURE: 74 MMHG | TEMPERATURE: 97.6 F | HEART RATE: 80 BPM | BODY MASS INDEX: 22.76 KG/M2 | WEIGHT: 159 LBS | HEIGHT: 70 IN | SYSTOLIC BLOOD PRESSURE: 148 MMHG

## 2022-02-12 VITALS
TEMPERATURE: 97.7 F | DIASTOLIC BLOOD PRESSURE: 84 MMHG | WEIGHT: 165 LBS | HEART RATE: 74 BPM | SYSTOLIC BLOOD PRESSURE: 126 MMHG | HEIGHT: 70 IN | BODY MASS INDEX: 23.62 KG/M2

## 2022-02-12 VITALS
HEIGHT: 70 IN | DIASTOLIC BLOOD PRESSURE: 72 MMHG | TEMPERATURE: 97.9 F | WEIGHT: 158.9 LBS | HEART RATE: 82 BPM | SYSTOLIC BLOOD PRESSURE: 130 MMHG | BODY MASS INDEX: 22.75 KG/M2

## 2022-02-15 NOTE — PROGRESS NOTES
Patient:   PARIS FARMER            MRN: [id number]            FIN: [id number]               Age:   74 years     Sex:  Male     :  1945   Associated Diagnoses:   Sleep apnea; Fibromyalgia; Chronic pain; Chronic headache; Common migraine   Author:   Alexander Higginbotham MD      Visit Information   Visit type:  Scheduled follow-up.    Accompanied by:  No one.    Source of history:  Self.    History limitation:  None.       Chief Complaint   2020 4:32 PM CST     Rx refill. Sleep issues.      History of Present Illness    The patient s pain remains controlled.  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  He is often restless at night and wondered about using melatonin.  He goes to sleep in his with his CPAP but often wakes up during the night and gets up and goes to the recliner without his CPAP.      Has the patients condition changed significantly since their last visit?     no  Has the patient been compliant with treatments, including non-opioid treatments?    yes  Has there been any aberrant opioid behavior since the last visit?     no  Is the total opioid dose less than 90 morphine equivalents?    yes  Is there reasonable progress toward meeting established functional goals?     remains active  Was the last drug test consistent with prescribed medications?     yes  Is another  drug test due at this visit?    no  Has the ePDMP been reviewed?     yes  Is the patient complying with their signed Controlled Substance Abuse Agreement?     yes  Based on all the above, is the patient still a candidate for chronic opioid therapy?       yes         Review of Systems   Constitutional:  No fever, No chills, No fatigue, No decreased activity, No weight loss.    Ear/Nose/Mouth/Throat:  Negative except as documented in history of present illness.    Respiratory:  Negative except as documented in history of present illness.    Cardiovascular:  No chest pain, No palpitations, No peripheral edema.     Gastrointestinal:  No nausea, No vomiting, No diarrhea, No constipation.    Musculoskeletal:  Negative except as documented in history of present illness.    Neurologic:  Negative except as documented in history of present illness.       Health Status   Allergies:    Allergic Reactions (Selected)  Severity Not Documented  Amitriptyline (Dizziness and dry mouth)  Indocin (Memory problems and disorientation)  Paxil (Anxious and trembles)  Nonallergic Reactions (Selected)  Severity Not Documented  Beta blockers (Reactive airway disease)   Medications:  (Selected)   Prescriptions  Prescribed  Norco 5 mg-325 mg oral tablet: 1 tab(s), po, q4-6 hrs, Instructions: Up to 5 tabs per day. Fill 3/11/2020, # 150 EA, 0 Refill(s), Type: Maintenance, Pharmacy: [facility] #[id number], 1 tab(s) Oral q4-6 hrs,Instr:Up to 5 tabs per day. Fill 3/11/2020  Norco 5 mg-325 mg oral tablet: 1 tab(s), po, q4-6 hrs, Instructions: Up to 5 tabs per day., # 150 EA, 0 Refill(s), Type: Maintenance, Pharmacy: [facility] #[id number], 1 tab(s) Oral q4-6 hrs,Instr:Up to 5 tabs per day.  Replacment CPAP +9 cm H2o: Replacment CPAP +9 cm H2o, See Instructions, Instructions: Heated humidifier, heated tubing, filters, nasal or full face mask of choice with headgear.  Replacement cushions and supplies as needed.  Change supplies every 6 mos  Months = 99 (Lifetime),...  eletriptan 40 mg oral tablet: = 1 tab(s) ( 40 mg ), PO, Once, PRN: for migraine headache, # 12 tab(s), 5 Refill(s), Type: Soft Stop, Pharmacy: [facility] #[id number], 1 tab(s) Oral once,PRN:for migraine headache  Documented Medications  Documented  Nasacort: 0 Refill(s), Type: Maintenance  ferrous sulfate 325 mg (65 mg elemental iron) oral tablet: 1 tab(s) ( 325 mg ), po, daily, 0 Refill(s), Type: Maintenance,    Medications          *denotes recorded medication          Replacment CPAP +9 cm H2o: See Instructions, Heated humidifier, heated tubing, filters, nasal or full face  mask of choice with headgear.  Replacement cushions and supplies as needed.  Change supplies every 6 mos  Months = 99 (Lifetime), 1 EA, 11 Refill(s).          Norco 5 mg-325 mg oral tablet: 1 tab(s), po, q4-6 hrs, Up to 5 tabs per day. Fill 3/11/2020, 150 EA, 0 Refill(s).          Norco 5 mg-325 mg oral tablet: 1 tab(s), po, q4-6 hrs, Up to 5 tabs per day., 150 EA, 0 Refill(s).          eletriptan 40 mg oral tablet: 40 mg, 1 tab(s), PO, Once, PRN: for migraine headache, 12 tab(s), 5 Refill(s).          *ferrous sulfate 325 mg (65 mg elemental iron) oral tablet: 325 mg, 1 tab(s), po, daily, 0 Refill(s).          *Nasacort: 0 Refill(s), Type: Maintenance.       Problem list:    All Problems  Acid reflux / SNOMED CT 066336918 / Confirmed  BPH associated with nocturia / SNOMED CT 6875602848 / Confirmed  Chronic headache / SNOMED CT 1441374815 / Confirmed  Chronic pain / SNOMED CT 271739027 / Confirmed  Common migraine / SNOMED CT 85349924 / Confirmed  Fibromyalgia / SNOMED CT 97777837 / Confirmed  Glucose intolerance (impaired glucose tolerance) / SNOMED CT 98548055 / Confirmed  H/O: iron deficiency anemia / SNOMED CT 278657286 / Confirmed  IBS (irritable bowel syndrome) / SNOMED CT 93287597 / Confirmed  NSAID-induced gastric ulcer / SNOMED CT 1783308797 / Confirmed  Periodic limb movement sleep disorder / SNOMED CT 0901838045 / Confirmed  Rhinitis, chronic / SNOMED CT 350732287 / Confirmed  Sleep apnea / SNOMED CT 022482920 / Confirmed  Use of opiates for therapeutic purposes / SNOMED CT 025376298 / Confirmed  Varicose veins / SNOMED CT 433737456 / Confirmed  Resolved: *Hospitalized@[facility] - Acute bronchitis  Resolved: *Hospitalized@[facility] - NSAID gastropathy  Resolved: Depression with anxiety / SNOMED CT 611521940  Canceled: Common Migraine / ICD-9-.10      Histories   Past Medical History:    Active  Sleep apnea (751097134): Onset on 11/22/2004 at 59 years.  Comments:  9/12/2014 CDT 5:28 PM CDT - Anahy MARCUS,  Alexander  At [facility]11/22/2004: RDI 30.1 with oximetry james 63%. 4/1/2005 adequate CPAP titration to 8. Optimal CPAP titration to 9 at [facility] 10/30/2009.  Chronic headache (2414493993)  Acid reflux (226161047)  IBS (irritable bowel syndrome) (69757171)  Fibromyalgia (06013324)  Periodic limb movement sleep disorder (4971732611)  Common migraine (98755047)  NSAID-induced gastric ulcer (3401896873)  H/O: iron deficiency anemia (195203403)  Rhinitis, chronic (470607011)  Resolved  *Hospitalized@[facility] - Acute bronchitis: Onset on 3/7/2012 at 66 years.  Resolved.  *Hospitalized@[facility] - NSAID gastropathy: Onset on 7/25/2011 at 65 years.  Resolved.  Depression with anxiety (459827608):  Resolved.   Family History:    Colitis  Sister  CA - Cancer  Mother: onset at 60 .     Procedure history:    Capsule endoscopy (7183707412) on 12/30/2009 at 64 Years.  Comments:  11/23/2010 3:28 PM Alexander Lewis MD  negative  Colonoscopy (116420629) on 12/3/2009 at 64 Years.  Comments:  9/12/2014 5:22 PM Alexander Gtz MD  Normal    11/23/2010 3:28 PM Alexander Lewis MD  negative  Upper gastrointestinal endoscopy (744501624) on 7/17/2009 at 63 Years.  Comments:  11/23/2010 3:30 PM Alexander Lewis MD  gastritis  Esophagogastroduodenoscopy (568177461) on 5/28/2004 at 58 Years.  Comments:  9/12/2014 5:31 PM Alexander Gtz MD  Mild gastritis and incomplete Schatzki's ring  Colonoscopy (692025330) on 5/28/2004 at 58 Years.  Comments:  9/12/2014 5:29 PM Alexander Gtz MD  Normal  Cholecystectomy (97231142) in 2002 at 57 Years.  Spinal fusion (23260544) in 1995 at 50 Years.  Esophagogastroduodenoscopy (0111320919) in 1989 at 44 Years.  Endoscopy with biopsy in the month of 7/1989 at 43 Years.  Right knee arthroscopy.   Social History:        Alcohol Assessment            Current, 1 drinks/episode average.      Tobacco Assessment            Never      Substance Abuse Assessment: Denies Substance  Abuse      Employment and Education Assessment            Employed                     Comments:                      12/06/2012 - Anahy MARCUS, Alexander                     correction      Home and Environment Assessment            Marital status:  (Living together).      Exercise and Physical Activity Assessment            Exercise frequency: Exercises but did not indicate how often..        Physical Examination   Vital Signs   2/6/2020 4:32 PM CST Temperature Tympanic 97.5 DegF  LOW    Peripheral Pulse Rate 69 bpm    HR Method Electronic    Systolic Blood Pressure 124 mmHg    Diastolic Blood Pressure 78 mmHg    Mean Arterial Pressure 93 mmHg    BP Site Right arm    BP Method Manual    Oxygen Saturation 98 %      Measurements from flowsheet : Measurements   2/6/2020 4:32 PM CST Height Measured - Standard 70 in    Weight Measured - Standard 160.08 lb    BSA 1.89 m2    Body Mass Index 22.97 kg/m2      General:  Alert and oriented, No acute distress.    HENT:  Normocephalic, Normal hearing, Oral mucosa is moist, No pharyngeal erythema.    Neck:  Supple, Non-tender, No carotid bruit, No lymphadenopathy, No thyromegaly.    Respiratory:  Lungs are clear to auscultation, Respirations are non-labored.    Cardiovascular:  Normal rate, Regular rhythm, No murmur, No gallop.    Musculoskeletal:  Normal gait.    Integumentary:  No pallor.    Neurologic:  No focal deficits.    Cognition and Speech:  Speech clear and coherent, Functional cognition intact.    Psychiatric:  Cooperative, Appropriate mood & affect.       Impression and Plan   Diagnosis     Sleep apnea (QVS10-ZV G47.30).     Fibromyalgia (ZAY36-MU M79.7).     Chronic pain (CXG43-GZ G89.29).     Chronic headache (VVN19-DE R51).     Common migraine (DSL90-HD G43.009).     Course:  Good response to treatment.    Patient Instructions:       Counseled: Patient, Diet, Activity, Verbalized understanding, Reviewed the importance of CPAP adherence to reduce daytime  sleepiness and prevent excess mortality associated with sleep apnea .    Orders     Orders (Selected)   Outpatient Orders  Order  Return to Clinic (Request): RFV: Wellness, Return in 2 months, Instructions: lab before visit  Return to Office (Request): RFV: Annual cbc, chem 14, psa, tsh, ferritin. lipids, Return in Annually in June  Prescriptions  Prescribed  Norco 5 mg-325 mg oral tablet: 1 tab(s), po, q4-6 hrs, Instructions: Up to 5 tabs per day. Fill 3/11/2020, # 150 EA, 0 Refill(s), Type: Maintenance, Pharmacy: [facility] #[id number], 1 tab(s) Oral q4-6 hrs,Instr:Up to 5 tabs per day. Fill 3/11/2020  Norco 5 mg-325 mg oral tablet: 1 tab(s), po, q4-6 hrs, Instructions: Up to 5 tabs per day., # 150 EA, 0 Refill(s), Type: Maintenance, Pharmacy: [facility] #[id number], 1 tab(s) Oral q4-6 hrs,Instr:Up to 5 tabs per day..

## 2022-02-15 NOTE — LETTER
(Inserted Image. Unable to display)     December 28, 2020      PARIS FARMER  [address], [place] [id number]          Dear PARIS,      Thank you for selecting [facility] (previously [facility]) for your healthcare needs.    Our records indicate you are due for the following services:     Hypertension check ~ please remember to bring your at-home blood pressure readings with you to your appointment.     (FYI   Regarding office visits: In some instances, a video visit or telephone visit may be offered as an option.)      To schedule an appointment or if you have further questions, please contact your primary clinic:   [facility]       [phone]   [facility]       [phone]              [facility]     [phone]      Powered by [email or web address] and [employer]    Sincerely,    Alexander Higginbotham MD, FACP

## 2022-02-15 NOTE — NURSING NOTE
Generalized Anxiety Disorder Screening Entered On:  2/22/2021 9:48 AM CST    Performed On:  2/22/2021 9:48 AM CST by Kelly Pittman               Generalized Anxiety Disorder Screening   FEROZ Nervous, Anxious On Edge :   More than half the days   FEROZ Control Worrying B :   More than half the days   FEROZ Worrying Too Much :   More than half the days   FEROZ Trouble Relaxing :   More than half the days   FEROZ Restless :   More than half the days   FEROZ Easily Annoyed/Irritable :   More than half the days   FEROZ Afraid :   More than half the days   FEROZ Total Screening Score :   14    FEROZ Difficulty with Work, Home, Others :   Somewhat difficult   Kelly Pittman - 2/22/2021 9:48 AM CST

## 2022-02-15 NOTE — NURSING NOTE
Medicare Visit Entered On:  2020 9:16 AM CST    Performed On:  2020 9:07 AM CST by Natalie Rogers               Summary   Chief Complaint :   AWV,  med refills, discuss depression- wife has cancer and going through chemotherapy.    Weight Measured :   159 lb(Converted to: 159 lb 0 oz, 72.121 kg)    Height Measured :   70 in(Converted to: 5 ft 10 in, 177.80 cm)    Body Mass Index :   22.81 kg/m2   Body Surface Area :   1.89 m2   Height/Length Estimated :   7 in(Converted to: 0 ft 7 in, 17.78 cm)    Systolic Blood Pressure :   158 mmHg (HI)    Diastolic Blood Pressure :   78 mmHg   Mean Arterial Pressure :   105 mmHg   Peripheral Pulse Rate :   80 bpm   BP Site :   Right arm   Pulse Site :   Radial artery   BP Method :   Manual   HR Method :   Manual   Temperature Tympanic :   97.6 DegF(Converted to: 36.4 DegC)  (LOW)    Race :      Languages :   English   Ethnicity :   Not  or    Natalie Rogers - 2020 9:07 AM CST   Health Status   Allergies Verified? :   Yes   Medication History Verified? :   Yes   Medical History Verified? :   Yes   Pre-Visit Planning Status :   Completed   Tobacco Use? :   Never smoker   Natalie Rogers - 2020 9:07 AM CST   Demographics   Last Name :   Suresh   Address :   [address] [place] [address]   First Name :   Fer Pina Initial :   EARL   Responsible Party Date of Birth () :   1945 CDT   City :   [place]   State :   [place]   Zip Code :   [id number]   Contact Relationship to Patient (other) :   Patient   Natalie Rogers - 2020 9:07 AM CST   Providers Grid   Provider Name :    Sarah SMITH dental           Provider Specialty :    eye                Natalie Rogers - 2020 9:07 AM CST  Natalie Rogers - 2020 9:07 AM CST        Ancillary Services Grid   Name :    [facility]              Type of Service :    Pharmacy                Natalie Rogers - 2020 9:07 AM CST         Meds / Allergies   (As Of: 2020 9:16:33 AM CST)   Allergies  (Active)   amitriptyline  Estimated Onset Date:   Unspecified ; Reactions:   Dizziness, Dry mouth ; Created By:   Aliay Marcano; Reaction Status:   Active ; Category:   Drug ; Substance:   amitriptyline ; Type:   Allergy ; Updated By:   Aliya Marcano; Source:   Paper Chart ; Reviewed Date:   12/4/2020 9:14 AM CST      beta blockers  Estimated Onset Date:   Unspecified ; Reactions:   Reactive airway disease ; Created By:   Alexander Higginbotham MD; Reaction Status:   Active ; Category:   Drug ; Substance:   beta blockers ; Type:   Side Effect ; Updated By:   Alexander Higginbotham MD; Reviewed Date:   12/4/2020 9:14 AM CST      Indocin  Estimated Onset Date:   <not entered> 4/1/1992 ; Reactions:   memory problems, Disorientation ; Created By:   Aliya Marcano; Reaction Status:   Active ; Category:   Drug ; Substance:   Indocin ; Type:   Allergy ; Updated By:   Aliya Marcano; Source:   Paper Chart ; Reviewed Date:   12/4/2020 9:14 AM CST      Paxil  Estimated Onset Date:   Unspecified ; Reactions:   Anxious, Trembles ; Created By:   Aliya Marcano; Reaction Status:   Active ; Category:   Drug ; Substance:   Paxil ; Type:   Allergy ; Updated By:   Aliya Marcano; Source:   Paper Chart ; Reviewed Date:   12/4/2020 9:14 AM CST        Medication List   (As Of: 12/4/2020 9:16:33 AM CST)   Prescription/Discharge Order    eletriptan  :   eletriptan ; Status:   Prescribed ; Ordered As Mnemonic:   eletriptan 40 mg oral tablet ; Simple Display Line:   40 mg, 1 tab(s), PO, Once, PRN: for migraine headache, 12 tab(s), 5 Refill(s) ; Ordering Provider:   Alexander Higginbotham MD; Catalog Code:   eletriptan ; Order Dt/Tm:   10/9/2020 5:19:36 PM CDT          acetaminophen-hydrocodone  :   acetaminophen-hydrocodone ; Status:   Processing ; Ordered As Mnemonic:   Norco 5 mg-325 mg oral tablet ; Ordering Provider:   Alexander Higginbotham MD; Action Display:   Complete ; Catalog Code:   acetaminophen-hydrocodone  ; Order Dt/Tm:   12/4/2020 9:15:43 AM CST          acetaminophen-hydrocodone  :   acetaminophen-hydrocodone ; Status:   Processing ; Ordered As Mnemonic:   Norco 5 mg-325 mg oral tablet ; Ordering Provider:   Alexander Higginbotham MD; Action Display:   Complete ; Catalog Code:   acetaminophen-hydrocodone ; Order Dt/Tm:   12/4/2020 9:15:43 AM CST          Miscellaneous Rx Supply  :   Miscellaneous Rx Supply ; Status:   Prescribed ; Ordered As Mnemonic:   Replacment CPAP +9 cm H2o ; Simple Display Line:   See Instructions, Heated humidifier, heated tubing, filters, nasal or full face mask of choice with headgear.  Replacement cushions and supplies as needed.  Change supplies every 6 mos  Months = 99 (Lifetime), 1 EA, 11 Refill(s) ; Ordering Provider:   Alexander Higginbotham MD; Catalog Code:   Miscellaneous Rx Supply ; Order Dt/Tm:   4/13/2017 3:34:47 PM CDT            Home Meds    ferrous sulfate  :   ferrous sulfate ; Status:   Documented ; Ordered As Mnemonic:   ferrous sulfate 325 mg (65 mg elemental iron) oral tablet ; Simple Display Line:   325 mg, 1 tab(s), po, daily, 0 Refill(s) ; Catalog Code:   ferrous sulfate ; Order Dt/Tm:   1/25/2018 4:43:44 PM CST          triamcinolone nasal  :   triamcinolone nasal ; Status:   Documented ; Ordered As Mnemonic:   Nasacort ; Catalog Code:   triamcinolone nasal ; Order Dt/Tm:   6/17/2014 3:37:34 PM CDT            Social History   Social History   (As Of: 12/4/2020 9:16:33 AM CST)   Alcohol:        Current, 1 drinks/episode average.   (Last Updated: 3/26/2018 1:24:54 PM CDT by Sophie Guzman)          Tobacco:        Never (less than 100 in lifetime)   (Last Updated: 12/4/2020 9:08:25 AM CST by Natalie Rogers)          Electronic Cigarette/Vaping:        Electronic Cigarette Use: Never.   (Last Updated: 12/4/2020 9:08:31 AM CST by Natalie Rogers)          Substance Abuse:  Denies Substance Abuse      (Last Updated: 6/8/2018 12:36:07 PM CDT by Maria Elena Kidd )          Employment/School:        Employed   Comments:  12/6/2012 4:53 PM - Alexander Higginbotham MD: correction   (Last Updated: 12/6/2012 4:53:01 PM CST by Alexander Higginbotham MD)          Home/Environment:        Marital status:  (Living together).   (Last Updated: 12/6/2012 4:53:01 PM CST by Alexander Higginbotham MD)          Exercise:        Exercise frequency: Exercises but did not indicate how often..   (Last Updated: 6/8/2018 12:38:28 PM CDT by Maria Elena Kidd)            Hearing and Vision Screening   Audiogram Result Right Ear :   Pass   Audiogram Result Left Ear :   Pass   Natalie Rogers - 12/4/2020 9:07 AM CST   Home Safety Screen   Emergency Numbers Kept/Updated :   Yes   Aware of Smoking Dangers :   Yes   Smoke Alarms/Fire Extinguisher Available :   Yes   Household Members Fire Safety Knowledge :   Yes   Floor Rugs Removed or Fastened :   Yes   Mats in Bathtub/Shower :   Yes   Outdoor Clutter Safety :   Yes   Indoor Clutter Safety :   Yes   Electrical Cord Safety :   Yes   Natalie Rogers - 12/4/2020 9:07 AM CST   Advance Directives   Advance Directive :   No   Natalie Rogers 12/4/2020 9:07 AM CST

## 2022-02-15 NOTE — PROGRESS NOTES
Chief Complaint  med refills  History of Present Illness  Fer has been doing well. ?He takes 5 Norco a day for 25 morphine equivalents with good relief of fibromyalgia pain. ?He is able to continue to work rather physical job as a  at a school, which he enjoys. ?He tried Lyrica which caused depression. ?After discussion we elected to start the patient on Cymbalta for his chronic fibromyalgia pain. ?Potential benefits and side effects. ?There is no been no psychotic symptoms behavior. ?He has several migraines per month promptly relieved by Relpax. ?These do not interfere with activity. ?He uses his CPAP every night. ?He reports no daytime sleepiness.  Review of Systems  No weight loss cough chest pain dyspnea?or neurologic symptoms. ?No depressed or anxious mood.  Problem List/Past Medical History  Ongoing  Acid Reflux  Benign prostatic hypertrophy (BPH) with nocturia  Chronic Headache  Common Migraine  Fibromyalgia  H/O: Iron Deficiency Anemia  IBS (Irritable Bowel Syndrome)  NSAID-Induced Gastric Ulcer  Periodic Limb Movement Sleep Disorder  Rhinitis, chronic  Sleep apnea  Varicose veins  Resolved  *[email or web address] - Acute bronchitis  *[email or web address] - NSAID gastropathy  Depression with anxiety  Procedure/Surgical History  Capsule endoscopy (12/30/2009),  Colonoscopy (12/03/2009),  Upper gastrointestinal endoscopy (07/17/2009),  Colonoscopy (05/28/2004),  Esophagogastroduodenoscopy (05/28/2004),  Cholecystectomy (2002),  Spinal fusion (1995),  Endoscopy with biopsy (07/1989),  Esophagogastroduodenoscopy (1989),  Right knee arthroscopy.  Home Medications  acetaminophen-hydrocodone 325 mg-5 mg oral tablet, 1 tab(s), po, q4-6 hrs, PRN  acetaminophen-hydrocodone 325 mg-5 mg oral tablet, 1 tab(s), po, q4-6 hrs, PRN  DULoxetine 30 mg oral delayed release capsule, 5 refills  Nasacort  Norco 5 mg-325 mg oral tablet, 1 tab(s), po, q4-6 hrs  Relpax 40 mg oral tablet, 40 mg, 1 tab(s), po, once, PRN, 5  refills  Slow Fe 160 mg oral tablet, extended release, 160 mg, 1 tab(s), po, bid  Allergies  Indocin?(Disorientation, memory problems)  Paxil?(Anxious, Trembles)  amitriptyline?(Dizziness, Dry mouth)  beta blockers?(Reactive airway disease)  Social History  Employment and Education  Employed  Home and Environment  Marital status:  (Living together).  Family History  CA - Cancer: Mother.  Colitis: Sister.  Immunizations  Physical Exam  Vitals & Measurements  HR:?75?(Peripheral)?  BP:?132/79?  HT:?70?in?  WT:?163?lb?  BMI:?23.39?  Patient appears comfortable. ?Alert and oriented. ?Chest clear. ?Cardiac exam regular. ?Extremities have no edema. ?Neurologic exam nonfocal. ?Affect is normal.  Lab Results  Diagnostic Results  Assessment/Plan  Common Migraine  Continue Relpax as needed. ?Discussed medications to prevent headache, but will not start him on his functioning and his regimen for fibromyalgia.  Ordered:  Return to Clinic (Request)  Fibromyalgia  Functional capacity is excellent pain is controlled. ?Discussed the importance of starting a chronic medication specifically for fibromyalgia such as duloxetine and will start that today. ?We discussed the risks and benefits. ?We will refill his Norco.  Ordered:  Return to Clinic (Request)  Sleep apnea  Patient is encouraged to continue his CPAP every night.  Ordered:  Return to Clinic (Request)  Orders:  acetaminophen-hydrocodone, 1 tab(s), PO, q4-6 hrs, PRN: as needed for pain, # 150 EA, 0 Refill(s), Type: Maintenance, Pharmacy: [facility] #[id number], Fill 3/27/17, 1 tab(s) po q4-6 hrs,PRN:as needed for pain  acetaminophen-hydrocodone, 1 tab(s), po, q4-6 hrs, # 150 EA, 0 Refill(s), Type: Maintenance, Pharmacy: [facility] #[id number], Fill on 4/26/171, 1 tab(s) po q4-6 hrs  DULoxetine, See Instructions, Instructions: One daily for 14 days then 2 daily, # 60 EA, 5 Refill(s), Type: Maintenance, Pharmacy: [facility] #[id number], One daily for 14 days then 2  daily  eletriptan, 1 tab(s) ( 40 mg ), po, once, Instructions: may repeat dose once in 2 hours, PRN: as needed for migraine headache, # 12 tab(s), 5 Refill(s), Type: Soft Stop, Pharmacy: [facility] #[id number], 1 tab(s) po once,PRN:as needed for migraine headache,Instr:m...

## 2022-02-15 NOTE — TELEPHONE ENCOUNTER
---------------------  From: Felicity Pope MA (Phone Messages Pool ([id number]_[facility]))   To: J Message Pool ([id number]_[place] [facility] [place]);     Sent: 7/28/2020 10:44:39 AM CDT  Subject: COVID results     Phone Message    PCP:   JENNIFER      Time of Call:  1025       Person Calling:  pt  Phone number:  [phone]    Reason for call:  pt is wanting COVID results from 7/22/2020  Returned call at: _    Note:   no results in chart as of yet    Last office visit and reason:  7/22/2020 w/ [facility] for not feeling well, nausea    Transferred to: J poolPatient notified that it is not back yet. Patient understood.

## 2022-02-15 NOTE — PROGRESS NOTES
Patient:   PARIS FARMER            MRN: [id number]            FIN: [id number]               Age:   75 years     Sex:  Male     :  1945   Associated Diagnoses:   Chronic pain; Common migraine; Fibromyalgia; Sleep apnea   Author:   Alexander Higginbotham MD      Visit Information   Visit type:  Scheduled follow-up.    Accompanied by:  No one.    Source of history:  Self.    History limitation:  None.       Chief Complaint   10/9/2020 4:39 PM CDT    Medication refill- hydrocodone. Question about eletriptan. Hernia surgery. Verbal consent given for telephone visit.      History of Present Illness     Today's visit was conducted via telephone due to the COVID-19 pandemic. Patient's consent to telephone visit was obtained and documented.  Call Start Time:   1725  Call End Time:   1710     The patient needs a refill of his hydrocodone and triptan drug for migraine.  His migraine does well when he uses his CPAP but sometimes he gets out of the habit.  He is back at work with no lifting since his hernia surgery earlier this fall.  His pain is controlled.  He tends to sleep in his recliner some of the time.  He has a CPAP device at his bed and his recliner now.      Has the patients condition changed significantly since their last visit?     see above  Has the patient been compliant with treatments, including non-opioid treatments?    yes  Has there been any aberrant opioid behavior since the last visit?     no  Is the total opioid dose less than 90 morphine equivalents?    yes, 25  Is there reasonable progress toward meeting established functional goals?     remains active  Was the last drug test consistent with prescribed medications?     yes  Is another  drug test due at this visit?    no, next in clinic visit  Has the ePDMP been reviewed?     yes  Is the patient complying with their signed Controlled Substance Abuse Agreement?     yes  Based on all the above, is the patient still a candidate for chronic opioid therapy?        yes         Review of Systems   Constitutional:  No fever, No chills, No fatigue, No decreased activity, No weight loss.    Ear/Nose/Mouth/Throat:  Nasal congestion.    Respiratory:  No shortness of breath, No cough.    Cardiovascular:  No chest pain, No palpitations, No peripheral edema.    Gastrointestinal:  No nausea, No vomiting, No diarrhea, No constipation.    Musculoskeletal:  Negative except as documented in history of present illness.    Neurologic:  Negative except as documented in history of present illness.       Health Status   Allergies:    Allergic Reactions (Selected)  Severity Not Documented  Amitriptyline (Dizziness and dry mouth)  Indocin (Memory problems and disorientation)  Paxil (Anxious and trembles)  Nonallergic Reactions (Selected)  Severity Not Documented  Beta blockers (Reactive airway disease)   Medications:  (Selected)   Prescriptions  Prescribed  Norco 5 mg-325 mg oral tablet: 1 tab(s), po, q4-6 hrs, Instructions: Up to 5 tabs per day. Fill 11/7/2020, # 150 EA, 0 Refill(s), Type: Maintenance, Pharmacy: [facility] #[id number], 1 tab(s) Oral q4-6 hrs,Instr:Up to 5 tabs per day. Fill 11/7/2020, 70, in, 10/09/20 16:39:00 C...  Norco 5 mg-325 mg oral tablet: 1 tab(s), po, q4-6 hrs, Instructions: Up to 5 tabs per day., # 150 EA, 0 Refill(s), Type: Maintenance, Pharmacy: [facility] #[id number], 1 tab(s) Oral q4-6 hrs,Instr:Up to 5 tabs per day., 70, in, 10/09/20 16:39:00 CDT, Height Measured, 156.8, lb...  Replacment CPAP +9 cm H2o: Replacment CPAP +9 cm H2o, See Instructions, Instructions: Heated humidifier, heated tubing, filters, nasal or full face mask of choice with headgear.  Replacement cushions and supplies as needed.  Change supplies every 6 mos  Months = 99 (Lifetime),...  eletriptan 40 mg oral tablet: = 1 tab(s) ( 40 mg ), PO, Once, PRN: for migraine headache, # 12 tab(s), 5 Refill(s), Type: Soft Stop, Pharmacy: [facility] #[id number], 1 tab(s) Oral once,PRN:for  migraine headache, 70, in, 10/09/20 16:39:00 CDT, Height Measured, 156.8, lb, 08/0...  Documented Medications  Documented  Nasacort: 0 Refill(s), Type: Maintenance  ferrous sulfate 325 mg (65 mg elemental iron) oral tablet: 1 tab(s) ( 325 mg ), po, daily, 0 Refill(s), Type: Maintenance,    Medications          *denotes recorded medication          Replacment CPAP +9 cm H2o: See Instructions, Heated humidifier, heated tubing, filters, nasal or full face mask of choice with headgear.  Replacement cushions and supplies as needed.  Change supplies every 6 mos  Months = 99 (Lifetime), 1 EA, 11 Refill(s).          Norco 5 mg-325 mg oral tablet: 1 tab(s), po, q4-6 hrs, Up to 5 tabs per day., 150 EA, 0 Refill(s).          Norco 5 mg-325 mg oral tablet: 1 tab(s), po, q4-6 hrs, Up to 5 tabs per day. Fill 11/7/2020, 150 EA, 0 Refill(s).          eletriptan 40 mg oral tablet: 40 mg, 1 tab(s), PO, Once, PRN: for migraine headache, 12 tab(s), 5 Refill(s).          *ferrous sulfate 325 mg (65 mg elemental iron) oral tablet: 325 mg, 1 tab(s), po, daily, 0 Refill(s).          *Nasacort: 0 Refill(s), Type: Maintenance.       Problem list:    All Problems  Acid reflux / SNOMED CT 502318383 / Confirmed  BPH associated with nocturia / SNOMED CT 1827717167 / Confirmed  Chronic headache / SNOMED CT 1084473222 / Confirmed  Chronic pain / SNOMED CT 512738134 / Confirmed  Common migraine / SNOMED CT 32863494 / Confirmed  Fibromyalgia / SNOMED CT 62650665 / Confirmed  Glucose intolerance (impaired glucose tolerance) / SNOMED CT 75037404 / Confirmed  H/O: iron deficiency anemia / SNOMED CT 943164601 / Confirmed  IBS (irritable bowel syndrome) / SNOMED CT 95861051 / Confirmed  NSAID-induced gastric ulcer / SNOMED CT 0272511246 / Confirmed  Periodic limb movement sleep disorder / SNOMED CT 4866269380 / Confirmed  Rhinitis, chronic / SNOMED CT 552381742 / Confirmed  Sleep apnea / SNOMED CT 320370812 / Confirmed  Use of opiates for therapeutic  purposes / SNOMED CT 862300534 / Confirmed  Varicose veins / SNOMED CT 625990954 / Confirmed  Resolved: *Hospitalized@[facility] Acute bronchitis  Resolved: *Hospitalized@[facility] - NSAID gastropathy  Resolved: Depression with anxiety / SNOMED CT 609284632  Canceled: Common Migraine / ICD-9-.10      Histories   Past Medical History:    Active  Sleep apnea (803660542): Onset on 11/22/2004 at 59 years.  Comments:  9/12/2014 CDT 5:28 PM Alexander Gtz MD  [facility]: RDI 30.1 with oximetry james 63%. 4/1/2005 adequate CPAP titration to 8. Optimal CPAP titration to 9 at [facility] 10/30/2009.  Chronic headache (3745711650)  Acid reflux (980806812)  IBS (irritable bowel syndrome) (42278407)  Fibromyalgia (89013201)  Periodic limb movement sleep disorder (9645674953)  Common migraine (85689177)  NSAID-induced gastric ulcer (2244243702)  H/O: iron deficiency anemia (315864333)  Rhinitis, chronic (536229358)  Resolved  *Hospitalized@[facility] - Acute bronchitis: Onset on 3/7/2012 at 66 years.  Resolved.  *Hospitalized@[facility] - NSAID gastropathy: Onset on 7/25/2011 at 65 years.  Resolved.  Depression with anxiety (247720954):  Resolved.   Family History:    Colitis  Sister  CA - Cancer  Mother: onset at 60 .     Procedure history:    Repair of inguinal hernia (00633566) on 8/13/2020 at 75 Years.  Capsule endoscopy (6878716056) on 12/30/2009 at 64 Years.  Comments:  11/23/2010 3:28 PM Alexander Lewis MD  negative  Colonoscopy (980669972) on 12/3/2009 at 64 Years.  Comments:  9/12/2014 5:22 PM Alexander Gtz MD  Normal    11/23/2010 3:28 PM Alexander Lewis MD  negative  Upper gastrointestinal endoscopy (903502110) on 7/17/2009 at 63 Years.  Comments:  11/23/2010 3:30 PM Alexander Lewis MD  gastritis  Esophagogastroduodenoscopy (702903804) on 5/28/2004 at 58 Years.  Comments:  9/12/2014 5:31 PM CDT - Alexander Higginbotham MD  Mild gastritis and incomplete Schatzki's ring  Colonoscopy  (023242619) on 5/28/2004 at 58 Years.  Comments:  9/12/2014 5:29 PM CDT - Alexander Higginbotham MD  Normal  Cholecystectomy (93766756) in 2002 at 57 Years.  Spinal fusion (80590506) in 1995 at 50 Years.  Esophagogastroduodenoscopy (1057070237) in 1989 at 44 Years.  Endoscopy with biopsy in the month of 7/1989 at 43 Years.  Right knee arthroscopy.   Social History:        Alcohol Assessment            Current, 1 drinks/episode average.      Tobacco Assessment            Never      Substance Abuse Assessment: Denies Substance Abuse      Employment and Education Assessment            Employed                     Comments:                      12/06/2012 - Alexander Higginbotham MD                     snf      Home and Environment Assessment            Marital status:  (Living together).      Exercise and Physical Activity Assessment            Exercise frequency: Exercises but did not indicate how often..        Physical Examination   Home /99 and HR 74   Measurements from flowsheet : Measurements   10/9/2020 4:39 PM CDT Height Measured - Standard 70 in    Height/Length Estimated 7 in         Impression and Plan   Diagnosis     Chronic pain (QOM34-VK G89.29).     Common migraine (JPR50-FP G43.009).     Fibromyalgia (OOC00-KJ M79.7).     Sleep apnea (ODV76-JV G47.30).     Course:  Good response to treatment.    Patient Instructions:       Counseled: Patient, Diet, Activity, Verbalized understanding, Reviewed the importance of CPAP adherence to reduce daytime sleepiness and prevent excess mortality associated with sleep apnea .    Orders     Orders (Selected)   Outpatient Orders  Ordered  Return to Clinic (Request): RFV: Wellness, Return in 2 months, Instructions: lab before visit  Return to Office (Request): RFV: Annual cbc, chem 14, psa, tsh, ferritin. lipids, Hgb A1c, Return in Annually in June  Prescriptions  Prescribed  Norco 5 mg-325 mg oral tablet: 1 tab(s), po, q4-6 hrs, Instructions: Up to 5 tabs per day.  Fill 11/7/2020, # 150 EA, 0 Refill(s), Type: Maintenance, Pharmacy: [facility] #[id number], 1 tab(s) Oral q4-6 hrs,Instr:Up to 5 tabs per day. Fill 11/7/2020, 70, in, 10/09/20 16:39:00 C...  Norco 5 mg-325 mg oral tablet: 1 tab(s), po, q4-6 hrs, Instructions: Up to 5 tabs per day., # 150 EA, 0 Refill(s), Type: Maintenance, Pharmacy: [facility] #[id number], 1 tab(s) Oral q4-6 hrs,Instr:Up to 5 tabs per day., 70, in, 10/09/20 16:39:00 CDT, Height Measured, 156.8, lb...  eletriptan 40 mg oral tablet: = 1 tab(s) ( 40 mg ), PO, Once, PRN: for migraine headache, # 12 tab(s), 5 Refill(s), Type: Soft Stop, Pharmacy: [facility] #[id number], 1 tab(s) Oral once,PRN:for migraine headache, 70, in, 10/09/20 16:39:00 CDT, Height Measured, 156.8, lb, 08/0....

## 2022-02-15 NOTE — PROGRESS NOTES
Patient:   PARIS FARMER            MRN: [id number]            FIN: [id number]               Age:   75 years     Sex:  Male     :  1945   Associated Diagnoses:   Common migraine; Sleep apnea; Fibromyalgia; Glucose intolerance (impaired glucose tolerance); H/O: iron deficiency anemia; Essential tremor; Wellness examination; HTN, goal below 130/80   Author:   Alexander Higginbotham MD      Visit Information   Visit type:  Annual exam.    Accompanied by:  No one.    Source of history:  Self.    History limitation:  None.       Chief Complaint   2020 9:07 AM CST    AWV,  med refills, discuss depression- wife has cancer and going through chemotherapy.      History of Present Illness   The patient is a 75 year old with a history of migraines, sleep apnea treated with CPAP, fibromyalgia, impaired glucose tolerance, and history of iron deficiency here for a wellness visit.  He has had a chronic tremor, left worse than right hand.  It is worse with activity and relieved by rest.  His mother had a similar tremor it sounds like.  A sister, however, has Parkinson s disease.  He is using CPAP most nights.  He continues to work.  His wife was recently diagnosed with metastatic lung cancer with a brain met which was resected and she is getting chemotherapy with a poor prognosis.    On complete review of systems he has fatigue, wears glasses, snoring and sleep apnea, brushes and flosses daily, joint pain, muscle pain, and migrainous headaches.  Complete review of systems is otherwise negative.    Past medical and family history is reviewed and updated.    GDS short form score is 1.    He drinks one alcoholic beverage four or five times per week.    No significant positives on the health risk assessment form.  Has the patients condition changed significantly since their last visit?     no  Has the patient been compliant with treatments, including non-opioid treatments?    yes  Has there been any aberrant opioid behavior since the  last visit?     no  Is the total opioid dose less than 90 morphine equivalents?    yes  Is there reasonable progress toward meeting established functional goals?     remains active  Was the last drug test consistent with prescribed medications?     yes  Is another  drug test due at this visit?     no  Has the ePDMP been reviewed?     yes  Is the patient complying with their signed Controlled Substance Abuse Agreement?     yes  Based on all the above, is the patient still a candidate for chronic opioid therapy?       yes         Review of Systems   See HPI       Health Status   Allergies:    Allergic Reactions (Selected)  Severity Not Documented  Amitriptyline (Dizziness and dry mouth)  Indocin (Memory problems and disorientation)  Paxil (Anxious and trembles)  Nonallergic Reactions (Selected)  Severity Not Documented  Beta blockers (Reactive airway disease)   Medications:  (Selected)   Prescriptions  Prescribed  Norco 5 mg-325 mg oral tablet: 1 tab(s), po, q4-6 hrs, Instructions: Up to 5 tabs per day. Fill in one month, # 150 EA, 0 Refill(s), Type: Maintenance, Pharmacy: [facility] #[id number], 1 tab(s) Oral q4-6 hrs,Instr:Up to 5 tabs per day. Fill in one month, 70, in, 12/04/20 9:07...  Norco 5 mg-325 mg oral tablet: 1 tab(s), po, q4-6 hrs, Instructions: Up to 5 tabs per day., # 150 EA, 0 Refill(s), Type: Maintenance, Pharmacy: [facility] #[id number], 1 tab(s) Oral q4-6 hrs,Instr:Up to 5 tabs per day., 70, in, 12/04/20 9:07:00 CST, Height Measured, 159, lb, 1...  Replacment CPAP +9 cm H2o: Replacment CPAP +9 cm H2o, See Instructions, Instructions: Heated humidifier, heated tubing, filters, nasal or full face mask of choice with headgear.  Replacement cushions and supplies as needed.  Change supplies every 6 mos  Months = 99 (Lifetime),...  eletriptan 40 mg oral tablet: = 1 tab(s) ( 40 mg ), PO, Once, PRN: for migraine headache, # 12 tab(s), 5 Refill(s), Type: Soft Stop, Pharmacy: [facility]  #[id number], 1 tab(s) Oral once,PRN:for migraine headache, 70, in, 12/04/20 9:07:00 CST, Height Measured, 159, lb, 12/04/2...  propranolol 60 mg oral capsule, extended release: = 1 cap(s) ( 60 mg ), Oral, daily, # 30 cap(s), 11 Refill(s), Type: Maintenance, Pharmacy: [facility] #[id number], 1 cap(s) Oral daily, 70, in, 12/04/20 9:07:00 CST, Height Measured, 159, lb, 12/04/20 9:07:00 CST, Weight Measured  Documented Medications  Documented  Nasacort: 0 Refill(s), Type: Maintenance  ferrous sulfate 325 mg (65 mg elemental iron) oral tablet: 1 tab(s) ( 325 mg ), po, daily, 0 Refill(s), Type: Maintenance,    Medications          *denotes recorded medication          Replacment CPAP +9 cm H2o: See Instructions, Heated humidifier, heated tubing, filters, nasal or full face mask of choice with headgear.  Replacement cushions and supplies as needed.  Change supplies every 6 mos  Months = 99 (Lifetime), 1 EA, 11 Refill(s).          Norco 5 mg-325 mg oral tablet: 1 tab(s), po, q4-6 hrs, Up to 5 tabs per day., 150 EA, 0 Refill(s).          Norco 5 mg-325 mg oral tablet: 1 tab(s), po, q4-6 hrs, Up to 5 tabs per day. Fill in one month, 150 EA, 0 Refill(s).          eletriptan 40 mg oral tablet: 40 mg, 1 tab(s), PO, Once, PRN: for migraine headache, 12 tab(s), 5 Refill(s).          *ferrous sulfate 325 mg (65 mg elemental iron) oral tablet: 325 mg, 1 tab(s), po, daily, 0 Refill(s).          propranolol 60 mg oral capsule, extended release: 60 mg, 1 cap(s), Oral, daily, 30 cap(s), 11 Refill(s).          *Nasacort: 0 Refill(s), Type: Maintenance.       Problem list:    All Problems  Acid reflux / SNOMED CT 218420822 / Confirmed  BPH associated with nocturia / SNOMED CT 6110276941 / Confirmed  Chronic headache / SNOMED CT 9984492588 / Confirmed  Chronic pain / SNOMED CT 995722940 / Confirmed  Common migraine / SNOMED CT 28034869 / Confirmed  Essential tremor / SNOMED CT 4370959826 / Confirmed  Fibromyalgia / SNOMED CT 12045203 /  Confirmed  Glucose intolerance (impaired glucose tolerance) / SNOMED CT 66432941 / Confirmed  H/O: iron deficiency anemia / SNOMED CT 043538346 / Confirmed  HTN, goal below 130/80 / SNOMED CT 9513197415 / Confirmed  IBS (irritable bowel syndrome) / SNOMED CT 75713007 / Confirmed  NSAID-induced gastric ulcer / SNOMED CT 4848900371 / Confirmed  Periodic limb movement sleep disorder / SNOMED CT 1180774167 / Confirmed  Rhinitis, chronic / SNOMED CT 475531355 / Confirmed  Sleep apnea / SNOMED CT 786923535 / Confirmed  Use of opiates for therapeutic purposes / SNOMED CT 595892457 / Confirmed  Varicose veins / SNOMED CT 281668883 / Confirmed  Resolved: *Hospitalized@[facility] Acute bronchitis  Resolved: *Hospitalized@[facility] - NSAID gastropathy  Resolved: Depression with anxiety / SNOMED CT 461743019  Canceled: Common Migraine / ICD-9-.10      Histories   Past Medical History:    Active  Sleep apnea (905812386): Onset on 2004 at 59 years.  Comments:  2014 CDT 5:28 PM CDT - Alexander Higginbotham MD  [facility]: RDI 30.1 with oximetry james 63%. 2005 adequate CPAP titration to 8. Optimal CPAP titration to 9 at [facility] 10/30/2009.  Chronic headache (4308537685)  Acid reflux (429469049)  IBS (irritable bowel syndrome) (84966754)  Fibromyalgia (02223943)  Periodic limb movement sleep disorder (7718248263)  Common migraine (13372334)  NSAID-induced gastric ulcer (2078750270)  H/O: iron deficiency anemia (504131900)  Rhinitis, chronic (304723979)  Resolved  *Hospitalized@[facility] - Acute bronchitis: Onset on 3/7/2012 at 66 years.  Resolved.  *Hospitalized@[facility] - NSAID gastropathy: Onset on 2011 at 65 years.  Resolved.  Depression with anxiety (617063099):  Resolved.   Family History:    Colitis  Sister  [place] - Cancer  Mother (): onset at 60 .  Parkinson disease  Sister (Arlette)  Tremor  Mother ()     Procedure history:    Repair of inguinal hernia (59416669) on 2020 at 75  Years.  Capsule endoscopy (1218636941) on 12/30/2009 at 64 Years.  Comments:  11/23/2010 3:28 PM Alexander Lewis MD  negative  Colonoscopy (339359669) on 12/3/2009 at 64 Years.  Comments:  9/12/2014 5:22 PM Alexander Gtz MD  Normal    11/23/2010 3:28 PM Alexander Lewis MD  negative  Upper gastrointestinal endoscopy (792145140) on 7/17/2009 at 63 Years.  Comments:  11/23/2010 3:30 PM Alexander Lewis MD  gastritis  Esophagogastroduodenoscopy (529167658) on 5/28/2004 at 58 Years.  Comments:  9/12/2014 5:31 PM Alexander Gtz MD  Mild gastritis and incomplete Schatzki's ring  Colonoscopy (271956064) on 5/28/2004 at 58 Years.  Comments:  9/12/2014 5:29 PM Alexander Gtz MD  Normal  Cholecystectomy (78657340) in 2002 at 57 Years.  Spinal fusion (58251119) in 1995 at 50 Years.  Esophagogastroduodenoscopy (7091348567) in 1989 at 44 Years.  Endoscopy with biopsy in the month of 7/1989 at 43 Years.  Right knee arthroscopy.   Social History:        Electronic Cigarette/Vaping Assessment            Electronic Cigarette Use: Never.      Alcohol Assessment            Current, Liquor (Hard) (1.5 oz), Daily, 1 drinks/episode average.      Tobacco Assessment            Never (less than 100 in lifetime)      Substance Abuse Assessment: Denies Substance Abuse      Employment and Education Assessment            Employed                     Comments:                      12/06/2012 - Alexander Higginbotham MD                     [employer]      Home and Environment Assessment            Marital status:  (Living together).      Exercise and Physical Activity Assessment            Exercise frequency: Exercises but did not indicate how often..        Physical Examination   Vital Signs   12/4/2020 9:07 AM CST Temperature Tympanic 97.6 DegF  LOW    Peripheral Pulse Rate 80 bpm    Pulse Site Radial artery    HR Method Manual    Systolic Blood Pressure 158 mmHg  HI    Diastolic Blood Pressure 78 mmHg     Mean Arterial Pressure 105 mmHg    BP Site Right arm    BP Method Manual      Measurements from flowsheet : Measurements   12/4/2020 9:07 AM CST Height Measured - Standard 70 in    Height/Length Estimated 7 in    Weight Measured - Standard 159 lb    BSA 1.89 m2    Body Mass Index 22.81 kg/m2      Documented vital signs:       Blood Pressure: Systolic  148  mmHg, Diastolic  74  mmHg.    General:  Alert and oriented, No acute distress.    Eye:  Extraocular movements are intact, Normal conjunctiva.    Airway:       Mallampati classification: I (soft palate, fauces, uvula, pillars visible).    HENT:  Normocephalic, Normal hearing, Oral mucosa is moist, No pharyngeal erythema.    Neck:  Supple, Non-tender, No carotid bruit, No lymphadenopathy, No thyromegaly.    Respiratory:  Lungs are clear to auscultation, Respirations are non-labored.    Cardiovascular:  Normal rate, Regular rhythm, No murmur, No gallop, Normal peripheral perfusion, No edema.    Gastrointestinal:  Soft, Non-tender, No organomegaly.    Lymphatics:  No lymphadenopathy neck, axilla, groin.    Musculoskeletal:  Normal gait.    Integumentary:  No pallor, No rash.    Feet:  Normal by visual exam, Normal pulses, Sensation intact, By monofilament exam, By vibration.    Neurologic:  Normal motor function, No focal deficits, Cranial Nerves II-XII are grossly intact, No resting tremor or bradykinesia.    Cognition and Speech:  Speech clear and coherent, Functional cognition intact.    Psychiatric:  Cooperative, Appropriate mood & affect.       Review / Management   Results review:  Lab results   7/22/2020 10:14 AM CDT Coronavirus SARS-CoV-2 (COVID-19) NOT DETECTED   6/11/2020 8:52 AM CDT Sodium Level 138 mmol/L    Potassium Level 4.2 mmol/L    Chloride Level 101 mmol/L    CO2 Level 30 mmol/L    Glucose Level 131 mg/dL  HI    BUN 14 mg/dL    Creatinine Level 0.79 mg/dL    BUN/Creat Ratio NOT APPLICABLE    eGFR 90 mL/min/1.73m2    eGFR African American 104  mL/min/1.73m2    Calcium Level 9.7 mg/dL    Bilirubin Total 0.8 mg/dL    Alkaline Phosphatase 97 unit/L    AST/SGOT 17 unit/L    ALT/SGPT 12 unit/L    Protein Total 7.3 gm/dL    Albumin Level 3.9 gm/dL    Globulin 3.4    A/G Ratio 1.1    Cholesterol 180 mg/dL    Non-HDL Cholesterol 119    HDL 61 mg/dL    Cholesterol/HDL Ratio 3.0    LDL 95    Triglyceride 139 mg/dL    TSH 1.25 mIU/L    Ferritin 23 ng/mL  LOW    PSA 0.3 ng/mL    WBC 5.3    RBC 4.97    Hgb 14.0 gm/dL    Hct 43.0 %    MCV 86.5 fL    MCH 28.2 pg    MCHC 32.6 gm/dL    RDW 12.7 %    Platelet 309    MPV 9.1 fL   .       Impression and Plan   Diagnosis     Common migraine (GTW33-BR G43.009).     Sleep apnea (ZNQ80-XX G47.30).     Fibromyalgia (QOS74-DT M79.7).     Glucose intolerance (impaired glucose tolerance) (HEP80-JK R73.02).     H/O: iron deficiency anemia (ETN12-XN Z86.2).     Essential tremor (GSU31-ZT G25.0).     Wellness examination (DUD75-TT Z00.00).     HTN, goal below 130/80 (HOS30-UM I10).     Course:  Progressing as expected.    Patient Instructions:       Counseled: Patient, Diet, Activity, Verbalized understanding, Reviewed the importance of CPAP adherence to reduce daytime sleepiness and prevent excess mortality associated with sleep apnea .    Summary:  Chronic iron deficiency with previous negative GI tract workup. No GI tract symptoms. Will check Celiac panel and arrange screening colonoscopy. Seems to be coping quite well with wife's illness. New HTN with BET and migraine will start Propranolol. After review of chart doubt reactive airways due to Beta blocker. Stable for colonoscopy..    Orders     Orders (Selected)   Outpatient Orders  Ordered  Return to Clinic (Request): RFV: F/U Propranolol for HTN, tremor, Return in 2-4 weeks  Return to Clinic (Request): RFV: Wellness, Return in 12 months, Instructions: lab before visit  Return to Office (Request): RFV: Annual cbc, chem 14, psa, tsh, ferritin. lipids, Hgb A1c, Return in Annually in  June  Ordered (In Transit)  Celiac disease comprehensive panel* ([facility]): Specimen Type: Serum, Collection Date: 12/04/20 9:32:00 CST  Glucose* ([facility]): Specimen Type: Serum, Collection Date: 12/04/20 9:32:00 CST  Hemoglobin A1c* ([facility]): Specimen Type: Blood, Collection Date: 12/04/20 9:32:00 CST  Prescriptions  Prescribed  Norco 5 mg-325 mg oral tablet: 1 tab(s), po, q4-6 hrs, Instructions: Up to 5 tabs per day. Fill in one month, # 150 EA, 0 Refill(s), Type: Maintenance, Pharmacy: [facility] #[id number], 1 tab(s) Oral q4-6 hrs,Instr:Up to 5 tabs per day. Fill in one month, 70, in, 12/04/20 9:07...  Norco 5 mg-325 mg oral tablet: 1 tab(s), po, q4-6 hrs, Instructions: Up to 5 tabs per day., # 150 EA, 0 Refill(s), Type: Maintenance, Pharmacy: [facility] #[id number], 1 tab(s) Oral q4-6 hrs,Instr:Up to 5 tabs per day., 70, in, 12/04/20 9:07:00 CST, Height Measured, 159, lb, 1...  Replacment CPAP +9 cm H2o: Replacment CPAP +9 cm H2o, See Instructions, Instructions: Heated humidifier, heated tubing, filters, nasal or full face mask of choice with headgear.  Replacement cushions and supplies as needed.  Change supplies every 6 mos  Months = 99 (Lifetime),...  eletriptan 40 mg oral tablet: = 1 tab(s) ( 40 mg ), PO, Once, PRN: for migraine headache, # 12 tab(s), 5 Refill(s), Type: Soft Stop, Pharmacy: [facility] #[id number], 1 tab(s) Oral once,PRN:for migraine headache, 70, in, 12/04/20 9:07:00 CST, Height Measured, 159, lb, 12/04/2...  propranolol 60 mg oral capsule, extended release: = 1 cap(s) ( 60 mg ), Oral, daily, # 30 cap(s), 11 Refill(s), Type: Maintenance, Pharmacy: [facility] #[id number], 1 cap(s) Oral daily, 70, in, 12/04/20 9:07:00 CST, Height Measured, 159, lb, 12/04/20 9:07:00 CST, Weight Measured  Documented Medications  Documented  Nasacort: 0 Refill(s), Type: Maintenance  ferrous sulfate 325 mg (65 mg elemental iron) oral tablet: 1 tab(s) ( 325 mg ), po, daily, 0 Refill(s), Type:  Maintenance.     Device download.     Colonoscopy.

## 2022-02-15 NOTE — PROGRESS NOTES
Chief Complaint    discuss hernia, and having some feeling of depression possibly due to not sleeping due to not able to wear CPAP verbal consent given for telephone visit  History of Present Illness      Today's visit was conducted via telephone due to the COVID-19 pandemic.  Patient's consent to telephone visit was obtained and documented.      Call Start Time:  1605      Call End Time:   1625      Mr. Prince complains of depressed mood.  He thinks it is because he has not been using his CPAP for 2 months because he and his sons grew beards for the coronavirus epidemic.  He shaved his campa off and agreed to begin using it.  PHQ 9 score is 16.  He is not actively suicidal.      He complains of a lump in the right groin that goes away with lying down.  Its not painful.  Its golf ball size.       He complains of leg cramps since he has been doing some activities at work that involve stooping and squatting.  Review of Systems      No fever, chills, cough, dyspnea, chest pain, abdominal pain, diarrhea, nausea, vomiting.  Headache and chronic musculoskeletal pain unchanged.  Physical Exam   Vitals & Measurements    HT: 70 in  HT: 7 in   Assessment/Plan       Depression (F32.9)         Start Lexapro follow-up in a month.         Ordered:          91505 physician telephone evaluation 11-20 min (Charge), Quantity: 1, Inguinal hernia, right  Depression  Sleep apnea  Nocturnal leg cramps                Inguinal hernia, right (K40.90)         Surgery referral.  Discussed the complications of hernia including strangulation.         Ordered:          [id number] physician telephone evaluation 11-20 min (Charge), Quantity: 1, Inguinal hernia, right  Depression  Sleep apnea  Nocturnal leg cramps          Referral (Request), 07/17/20 15:59:00 CDT, Referred to: General Surgery, Referred to: Bertha, Reason for referral: right inguinal hernia, Inguinal hernia, right                Nocturnal leg cramps (G47.62)         Adequate  hygiene.  Recent electrolytes normal.  Call if not improved.         Ordered:          10235 physician telephone evaluation 11-20 min (Charge), Quantity: 1, Inguinal hernia, right  Depression  Sleep apnea  Nocturnal leg cramps                Sleep apnea (G47.30)         Encouraged him to start using his CPAP every night all night.         Ordered:          [id number] physician telephone evaluation 11-20 min (Charge), Quantity: 1, Inguinal hernia, right  Depression  Sleep apnea  Nocturnal leg cramps                Orders:         escitalopram, = 1 tab(s) ( 10 mg ), Oral, daily, # 30 tab(s), 5 Refill(s), Type: Maintenance, Pharmacy: [facility] #[id number], 1 tab(s) Oral daily, 70, in, 07/17/20 16:01:00 CDT, Height Measured, 161.2, lb, 06/11/20 9:14:00 CDT, Weight Measured, (Ordered)         Return to Clinic (Request), RFV: depression, Return in 4-6 weeks  Patient Information     Name:PARIS FARMER      Address:      [address] [place] [phone]     Sex:Male     YOB: 1945     [phone]     Emergency Contact:MARIBELL FARMER     MRN:[id number]     FIN:[id number]     Location:[facility]     Date of Service:07/17/2020      Primary Care Physician:       Alexander Higginbotham MD, [phone]      Attending Physician:       Alexander Higginbotham MD, [phone]  Problem List/Past Medical History    Ongoing     Acid reflux     BPH associated with nocturia     Chronic headache     Chronic pain     Common migraine     Fibromyalgia     Glucose intolerance (impaired glucose tolerance)     H/O: iron deficiency anemia     IBS (irritable bowel syndrome)     NSAID-induced gastric ulcer     Periodic limb movement sleep disorder     Rhinitis, chronic     Sleep apnea       Comments: At [facility]11/22/2004: RDI 30.1 with oximetry james 63%. 4/1/2005 adequate CPAP titration to 8. Optimal CPAP titration to 9 at [facility] 10/30/2009.     Use of opiates for therapeutic purposes      Varicose veins    Historical     *Hospitalized@[facility] - Acute bronchitis     *Hospitalized@[facility] - NSAID gastropathy     Depression with anxiety  Procedure/Surgical History     Capsule endoscopy (12/30/2009)      Comments: negative.     Colonoscopy (12/03/2009)      Comments: Normal. negative.     Upper gastrointestinal endoscopy (07/17/2009)      Comments: gastritis.     Colonoscopy (05/28/2004)      Comments: Normal.     Esophagogastroduodenoscopy (05/28/2004)      Comments: Mild gastritis and incomplete Schatzki's ring.     Cholecystectomy (2002)     Spinal fusion (1995)     Endoscopy with biopsy (07.1989)     Esophagogastroduodenoscopy (1989)     Right knee arthroscopy  Medications    eletriptan 40 mg oral tablet, 40 mg= 1 tab(s), Oral, once, PRN, 5 refills    ferrous sulfate 325 mg (65 mg elemental iron) oral tablet, 325 mg= 1 tab(s), Oral, daily    Lexapro 10 mg oral tablet, 10 mg= 1 tab(s), Oral, daily, 5 refills    Nasacort    Norco 5 mg-325 mg oral tablet, 1 tab(s), Oral, q4-6 hrs    Norco 5 mg-325 mg oral tablet, 1 tab(s), Oral, q4-6 hrs    Replacment CPAP +9 cm H2o, See Instructions, 11 refills  Allergies    Indocin (Disorientation, memory problems)    Paxil (Trembles, Anxious)    amitriptyline (Dizziness, Dry mouth)    beta blockers (Reactive airway disease)  Social History    Smoking Status - 07/17/2020     Never smoker     Alcohol      Current, 1 drinks/episode average., 03/26/2018     Employment/School      Employed, 12/06/2012     Exercise      Exercise frequency: Exercises but did not indicate how often.., 06/08/2018     Home/Environment      Marital status:  (Living together)., 12/06/2012     Substance Abuse - Denies Substance Abuse, 06/08/2018     Tobacco      Never, 11/21/2013  Family History    CA - Cancer: Mother (Dx at 60).    Colitis: Sister.    Brother: History is negative    Sister: History is negative    Sister: History is negative  Immunizations      Vaccine Date Status           influenza virus vaccine, inactivated 10/10/2019 Given          pneumococcal (PPSV23) 10/16/2018 Given          influenza virus vaccine, inactivated 10/16/2018 Given          influenza virus vaccine, inactivated 09/30/2017 Given          influenza virus vaccine, inactivated 08/30/2016 Given          ZOS, shingles 04/10/2016 Recorded              Comments : [4/11/2016] [facility]          pneumococcal (PCV13) 09/06/2015 Recorded          influenza virus vaccine, inactivated 09/06/2015 Recorded              Comments : [9/8/2015] per [facility]          influenza virus vaccine, inactivated 10/12/2013 Given          pneumococcal (PPSV23) 10/04/2012 Given          influenza virus vaccine, inactivated 10/04/2012 Given          influenza virus vaccine, inactivated 10/01/2011 Recorded          influenza virus vaccine, inactivated 09/21/2011 Given          influenza, H1N1, inactivated 01/26/2010 Recorded          influenza virus vaccine, inactivated 10/17/2009 Recorded          pneumococcal (PPSV23) 10/01/1996 Recorded  Lab Results          Lab Results (Last 4 results within 90 days)           Sodium Level: 138 mmol/L [135 mmol/L - 146 mmol/L] (06/11/20 08:52:00)          Potassium Level: 4.2 mmol/L [3.5 mmol/L - 5.3 mmol/L] (06/11/20 08:52:00)          Chloride Level: 101 mmol/L [98 mmol/L - 110 mmol/L] (06/11/20 08:52:00)          CO2 Level: 30 mmol/L [20 mmol/L - 32 mmol/L] (06/11/20 08:52:00)          Glucose Level: 131 mg/dL High [65 mg/dL - 99 mg/dL] (06/11/20 08:52:00)          BUN: 14 mg/dL [7 mg/dL - 25 mg/dL] (06/11/20 08:52:00)          Creatinine Level: 0.79 mg/dL [0.7 mg/dL - 1.18 mg/dL] (06/11/20 08:52:00)          BUN/Creat Ratio: NOT APPLICABLE [6  - 22] (06/11/20 08:52:00)          eGFR: 90 mL/min/1.73m2 (06/11/20 08:52:00)          eGFR African American: 104 mL/min/1.73m2 (06/11/20 08:52:00)          Calcium Level: 9.7 mg/dL [8.6 mg/dL - 10.3 mg/dL] (06/11/20 08:52:00)          Bilirubin Total: 0.8 mg/dL [0.2 mg/dL  - 1.2 mg/dL] (06/11/20 08:52:00)          Alkaline Phosphatase: 97 unit/L [35 unit/L - 144 unit/L] (06/11/20 08:52:00)          AST/SGOT: 17 unit/L [10 unit/L - 35 unit/L] (06/11/20 08:52:00)          ALT/SGPT: 12 unit/L [9 unit/L - 46 unit/L] (06/11/20 08:52:00)          Protein Total: 7.3 gm/dL [6.1 gm/dL - 8.1 gm/dL] (06/11/20 08:52:00)          Albumin Level: 3.9 gm/dL [3.6 gm/dL - 5.1 gm/dL] (06/11/20 08:52:00)          Globulin: 3.4 [1.9  - 3.7] (06/11/20 08:52:00)          A/G Ratio: 1.1 [1  - 2.5] (06/11/20 08:52:00)          Cholesterol: 180 mg/dL (06/11/20 08:52:00)          Non-HDL Cholesterol: 119 (06/11/20 08:52:00)          HDL: 61 mg/dL (06/11/20 08:52:00)          Cholesterol/HDL Ratio: 3 (06/11/20 08:52:00)          LDL: 95 (06/11/20 08:52:00)          Triglyceride: 139 mg/dL (06/11/20 08:52:00)          TSH: 1.25 mIU/L [0.4 mIU/L - 4.5 mIU/L] (06/11/20 08:52:00)          Ferritin: 23 ng/mL Low [24 ng/mL - 380 ng/mL] (06/11/20 08:52:00)          PSA: 0.3 ng/mL (06/11/20 08:52:00)          WBC: 5.3 [3.8  - 10.8] (06/11/20 08:52:00)          RBC: 4.97 [4.2  - 5.8] (06/11/20 08:52:00)          Hgb: 14 gm/dL [13.2 gm/dL - 17.1 gm/dL] (06/11/20 08:52:00)          Hct: 43 % [38.5 % - 50 %] (06/11/20 08:52:00)          MCV: 86.5 fL [80 fL - 100 fL] (06/11/20 08:52:00)          MCH: 28.2 pg [27 pg - 33 pg] (06/11/20 08:52:00)          MCHC: 32.6 gm/dL [32 gm/dL - 36 gm/dL] (06/11/20 08:52:00)          RDW: 12.7 % [11 % - 15 %] (06/11/20 08:52:00)          Platelet: 309 [140  - 400] (06/11/20 08:52:00)          MPV: 9.1 fL [7.5 fL - 12.5 fL] (06/11/20 08:52:00)

## 2022-02-15 NOTE — NURSING NOTE
Comprehensive Intake Entered On:  10/9/2020 4:45 PM CDT    Performed On:  10/9/2020 4:39 PM CDT by Kelly Pittman               Summary   Chief Complaint :   Medication refill- hydrocodone. Question about eletriptan. Hernia surgery. Verbal consent given for telephone visit.     Height Measured :   70 in(Converted to: 5 ft 10 in, 177.80 cm)    Height/Length Estimated :   7 in(Converted to: 0 ft 7 in, 17.78 cm)    Race :      Languages :   English   Ethnicity :   Not  or    Kelly Pittman - 10/9/2020 4:39 PM CDT   Health Status   Allergies Verified? :   Yes   Medication History Verified? :   No   Medical History Verified? :   Yes   Pre-Visit Planning Status :   Completed   Tobacco Use? :   Never smoker   Kelly Pittman - 10/9/2020 4:39 PM CDT   Consents   Consent for Immunization Exchange :   Consent Granted   Consent for Immunizations to Providers :   Consent Granted   Kelly Pittman - 10/9/2020 4:39 PM CDT   Meds / Allergies   (As Of: 10/9/2020 4:45:24 PM CDT)   Allergies (Active)   amitriptyline  Estimated Onset Date:   Unspecified ; Reactions:   Dizziness, Dry mouth ; Created By:   Aliya Marcano; Reaction Status:   Active ; Category:   Drug ; Substance:   amitriptyline ; Type:   Allergy ; Updated By:   Aliya Marcano; Source:   Paper Chart ; Reviewed Date:   10/9/2020 4:41 PM CDT      beta blockers  Estimated Onset Date:   Unspecified ; Reactions:   Reactive airway disease ; Created By:   Alexander Higginbotham MD; Reaction Status:   Active ; Category:   Drug ; Substance:   beta blockers ; Type:   Side Effect ; Updated By:   Alexander Higginbotham MD; Reviewed Date:   10/9/2020 4:41 PM CDT      Indocin  Estimated Onset Date:   <not entered> 4/1/1992 ; Reactions:   memory problems, Disorientation ; Created By:   Aliya Marcano; Reaction Status:   Active ; Category:   Drug ; Substance:   Indocin ; Type:   Allergy ; Updated By:   Aliya Marcano; Source:    Paper Chart ; Reviewed Date:   10/9/2020 4:41 PM CDT      Paxil  Estimated Onset Date:   Unspecified ; Reactions:   Anxious, Trembles ; Created By:   Aliya Marcano; Reaction Status:   Active ; Category:   Drug ; Substance:   Paxil ; Type:   Allergy ; Updated By:   Aliya Marcano; Source:   Paper Chart ; Reviewed Date:   10/9/2020 4:41 PM CDT        Medication List   (As Of: 10/9/2020 4:45:24 PM CDT)   Prescription/Discharge Order    ondansetron  :   ondansetron ; Status:   Processing ; Ordered As Mnemonic:   Zofran ODT 8 mg oral tablet, disintegrating ; Ordering Provider:   Farhan De Leon PA-C; Action Display:   Complete ; Catalog Code:   ondansetron ; Order Dt/Tm:   10/9/2020 4:42:45 PM CDT          escitalopram  :   escitalopram ; Status:   Processing ; Ordered As Mnemonic:   Lexapro 10 mg oral tablet ; Ordering Provider:   Juan Francisco Jones MD; Action Display:   Complete ; Catalog Code:   escitalopram ; Order Dt/Tm:   10/9/2020 4:42:41 PM CDT          acetaminophen-hydrocodone  :   acetaminophen-hydrocodone ; Status:   Prescribed ; Ordered As Mnemonic:   Norco 5 mg-325 mg oral tablet ; Simple Display Line:   1 tab(s), po, q4-6 hrs, Up to 5 tabs per day., 150 EA, 0 Refill(s) ; Ordering Provider:   Alexander Higginbotham MD; Catalog Code:   acetaminophen-hydrocodone ; Order Dt/Tm:   9/10/2020 1:33:19 PM CDT          eletriptan  :   eletriptan ; Status:   Prescribed ; Ordered As Mnemonic:   eletriptan 40 mg oral tablet ; Simple Display Line:   40 mg, 1 tab(s), PO, Once, PRN: for migraine headache, 6 tab(s), 5 Refill(s) ; Ordering Provider:   Alexander Higginbotham MD; Catalog Code:   eletriptan ; Order Dt/Tm:   8/10/2020 2:37:45 PM CDT          acetaminophen-hydrocodone  :   acetaminophen-hydrocodone ; Status:   Prescribed ; Ordered As Mnemonic:   Norco 5 mg-325 mg oral tablet ; Simple Display Line:   1 tab(s), po, q4-6 hrs, Up to 5 tabs per day. Fill 7/10/2020, 150 EA, 0 Refill(s) ; Ordering Provider:   Anahy  Alexander MARCUS; Catalog Code:   acetaminophen-hydrocodone ; Order Dt/Tm:   8/10/2020 11:44:16 AM CDT          acetaminophen-hydrocodone  :   acetaminophen-hydrocodone ; Status:   Prescribed ; Ordered As Mnemonic:   Norco 5 mg-325 mg oral tablet ; Simple Display Line:   1 tab(s), po, q4-6 hrs, Up to 5 tabs per day., 35 EA, 0 Refill(s) ; Ordering Provider:   Moises MONZON, Farhan LUKE; Catalog Code:   acetaminophen-hydrocodone ; Order Dt/Tm:   8/4/2020 11:01:05 AM CDT          Miscellaneous Rx Supply  :   Miscellaneous Rx Supply ; Status:   Prescribed ; Ordered As Mnemonic:   Replacment CPAP +9 cm H2o ; Simple Display Line:   See Instructions, Heated humidifier, heated tubing, filters, nasal or full face mask of choice with headgear.  Replacement cushions and supplies as needed.  Change supplies every 6 mos  Months = 99 (Lifetime), 1 EA, 11 Refill(s) ; Ordering Provider:   Alexander Higginbotham MD; Catalog Code:   Miscellaneous Rx Supply ; Order Dt/Tm:   4/13/2017 3:34:47 PM CDT            Home Meds    ferrous sulfate  :   ferrous sulfate ; Status:   Documented ; Ordered As Mnemonic:   ferrous sulfate 325 mg (65 mg elemental iron) oral tablet ; Simple Display Line:   325 mg, 1 tab(s), po, daily, 0 Refill(s) ; Catalog Code:   ferrous sulfate ; Order Dt/Tm:   1/25/2018 4:43:44 PM CST          triamcinolone nasal  :   triamcinolone nasal ; Status:   Documented ; Ordered As Mnemonic:   Nasacort ; Catalog Code:   triamcinolone nasal ; Order Dt/Tm:   6/17/2014 3:37:34 PM CDT            ID Risk Screen   Recent Travel History :   No recent travel   Family Member Travel History :   No recent travel   Other Exposure to Infectious Disease :   Unknown   Mauri/Kelly OWUSU - 10/9/2020 4:39 PM CDT

## 2022-02-15 NOTE — TELEPHONE ENCOUNTER
---------------------  From: Alexander Higginbotham MD   To: [facility] Message [facility] ([id number] - [place]);     Sent: 12/4/2020 9:51:20 AM CST  Subject: General Message     Compliance report please.Called and left message for patient to bring chip in for download.

## 2022-02-15 NOTE — PROGRESS NOTES
Patient:   PARIS FARMER            MRN: [id number]            FIN: [id number]               Age:   73 years     Sex:  Male     :  1945   Associated Diagnoses:   Sleep apnea; Fibromyalgia; Chronic pain; Chronic headache; Acute URI   Author:   Alexander Higginbotham MD      Visit Information   Visit type:  Scheduled follow-up.    Accompanied by:  No one.    Source of history:  Self.    History limitation:  None.       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 HA improved with CPAP use. Getting over a URI.    Has the patients condition changed significantly since their last visit?     no  Has the patient been compliant with treatments, including non-opioid treatments?    yes  Has there been any aberrant opioid behavior since the last visit?     no  Is the total opioid dose less than 90 morphine equivalents?    yes  Is there reasonable progress toward meeting established functional goals?     remains active  Was the last drug test consistent with prescribed medications?     yes  Is another  drug test due at this visit?     no  Has the ePDMP been reviewed?     yes  Is the patient complying with their signed Controlled Substance Abuse Agreement?     yes  Based on all the above, is the patient still a candidate for chronic opioid therapy?       yes      Review of Systems   Constitutional:  No fatigue, No decreased activity, No weight loss.    Ear/Nose/Mouth/Throat:  allergy symptoms.    Respiratory:  No shortness of breath, No cough.    Cardiovascular:  No chest pain, No palpitations, No peripheral edema.    Gastrointestinal:  No nausea, No vomiting, No diarrhea, No constipation.    Musculoskeletal:  Negative except as documented in history of present illness.    Neurologic:  Negative except as documented in history of present illness.       Health Status    Allergies:    Allergic Reactions (Selected)  Severity Not Documented  Amitriptyline (Dizziness and dry mouth)  Indocin (Memory problems and disorientation)  Paxil (Anxious and trembles)  Nonallergic Reactions (Selected)  Severity Not Documented  Beta blockers (Reactive airway disease)   Medications:  (Selected)   Prescriptions  Prescribed  Norco 5 mg-325 mg oral tablet: 1 tab(s), po, q4-6 hrs, Instructions: Up to 5 tabs per day. Fill 4/18/2019, # 150 EA, 0 Refill(s), Type: Maintenance, Pharmacy: [id number], 1 tab(s) Oral q4-6 hrs,Instr:Up to 5 tabs per day. Fill 4/18/2019  Norco 5 mg-325 mg oral tablet: 1 tab(s), po, q4-6 hrs, Instructions: Up to 5 tabs per day., # 150 EA, 0 Refill(s), Type: Maintenance, Pharmacy: [id number], 1 tab(s) Oral q4-6 hrs,Instr:Up to 5 tabs per day.  Replacment CPAP +9 cm H2o: Replacment CPAP +9 cm H2o, See Instructions, Instructions: Heated humidifier, heated tubing, filters, nasal or full face mask of choice with headgear.  Replacement cushions and supplies as needed.  Change supplies every 6 mos  Months = 99 (Lifetime),...  eletriptan 40 mg oral tablet: = 1 tab(s) ( 40 mg ), PO, Once, PRN: for migraine headache, # 12 tab(s), 5 Refill(s), Type: Soft Stop, Pharmacy: [id number], 1 tab(s) Oral once,PRN:for migraine headache  Documented Medications  Documented  Nasacort: 0 Refill(s), Type: Maintenance  ferrous sulfate 325 mg (65 mg elemental iron) oral tablet: 1 tab(s) ( 325 mg ), po, daily, 0 Refill(s), Type: Maintenance   Problem list:    All Problems  Acid reflux / SNOMED CT 140450628 / Confirmed  BPH associated with nocturia / SNOMED CT 7401768296 / Confirmed  Chronic headache / SNOMED CT 7562598530 / Confirmed  Chronic pain / SNOMED CT 879244212 / Confirmed  Common migraine / SNOMED CT 40511542 / Confirmed  Fibromyalgia / SNOMED CT 06536357 / Confirmed  [facility] /  Confirmed  H/O: iron deficiency anemia / SNOMED CT 474795897 / Confirmed  IBS (irritable bowel syndrome) / SNOMED CT 75265477 / Confirmed  NSAID-induced gastric ulcer / SNOMED CT 6854238123 / Confirmed  Periodic limb movement sleep disorder / SNOMED CT 7131619307 / Confirmed  Rhinitis, chronic / SNOMED CT 674358536 / Confirmed  Sleep apnea / SNOMED CT 633147835 / Confirmed  Varicose veins / SNOMED CT 648933814 / Confirmed  Resolved: *Hospitalized@[facility] Acute bronchitis  Resolved: *Hospitalized@[facility] NSAID gastropathy  Resolved: Depression with anxiety / SNOMED CT 175752530  Canceled: Common Migraine / ICD-9-.10      Histories   Past Medical History:    Active  Sleep apnea (736370157): Onset on 11/22/2004 at 59 years.  Comments:  9/12/2014 CDT 5:28 PM Alexander Gtz MD  [facility] RDI 30.1 with oximetry james 63%. 4/1/2005 adequate CPAP titration to 8. Optimal CPAP titration to 9 at [facility] 10/30/2009.  Chronic headache (8830242030)  Acid reflux (699382553)  IBS (irritable bowel syndrome) (30841822)  Fibromyalgia (45668455)  Periodic limb movement sleep disorder (4689580285)  Common migraine (07393535)  NSAID-induced gastric ulcer (3310274867)  H/O: iron deficiency anemia (749387939)  Rhinitis, chronic (428974926)  Resolved  *Hospitalized@[facility] Acute bronchitis: Onset on 3/7/2012 at 66 years.  Resolved.  *Hospitalized@[facility] NSAID gastropathy: Onset on 7/25/2011 at 65 years.  Resolved.  Depression with anxiety (939845067):  Resolved.   Family History:    Colitis  Sister  [place] - Cancer  Mother: onset at 60 .     Procedure history:    Capsule endoscopy (0421439248) on 12/30/2009 at 64 Years.  Comments:  11/23/2010 3:28 PM Alexander Lewis MD  negative  Colonoscopy (782729368) on 12/3/2009 at 64 Years.  Comments:  9/12/2014 5:22 PM Alexander Gtz MD  Normal    11/23/2010 3:28 PM Rich Lewis MDrey  negative  Upper gastrointestinal endoscopy (268514654) on  7/17/2009 at 63 Years.  Comments:  11/23/2010 3:30 PM Alexander Lewis MD  gastritis  Esophagogastroduodenoscopy (051635531) on 5/28/2004 at 58 Years.  Comments:  9/12/2014 5:31 PM Alexander Gtz MD  Mild gastritis and incomplete Schatzki's ring  Colonoscopy (417947761) on 5/28/2004 at 58 Years.  Comments:  9/12/2014 5:29 PM Alexander Gtz MD  Normal  Cholecystectomy (78590367) in 2002 at 57 Years.  Spinal fusion (27959638) in 1995 at 50 Years.  Esophagogastroduodenoscopy (2847943513) in 1989 at 44 Years.  Endoscopy with biopsy in the month of 7/1989 at 43 Years.  Right knee arthroscopy.   Social History:        Alcohol Assessment            Current, 1 drinks/episode average.      Tobacco Assessment            Never      Substance Abuse Assessment: Denies Substance Abuse      Employment and Education Assessment            Employed                     Comments:                      12/06/2012 - Alexander Higginbotham MD                     [employer]      Home and Environment Assessment            Marital status:  (Living together).      Exercise and Physical Activity Assessment            Exercise frequency: Exercises but did not indicate how often..      Physical Examination   Vital Signs   3/19/2019 4:39 PM CDT Peripheral Pulse Rate 77 bpm    Systolic Blood Pressure 116 mmHg    Diastolic Blood Pressure 73 mmHg    Mean Arterial Pressure 87 mmHg    BP Site Right arm      Measurements from flowsheet : Measurements   3/19/2019 4:39 PM CDT Height Measured - Standard 70 in    Weight Measured - Standard 156 lb    BSA 1.87 m2    Body Mass Index 22.38 kg/m2      General:  Alert and oriented, No acute distress.    Eye:  Normal conjunctiva.    HENT:  Normocephalic, Normal hearing, Oral mucosa is moist, No pharyngeal erythema.    Neck:  Supple, Non-tender, No carotid bruit, No lymphadenopathy, No thyromegaly.    Respiratory:  Lungs are clear to auscultation, Respirations are non-labored.    Cardiovascular:   Normal rate, Regular rhythm, No murmur, No gallop, No edema.    Musculoskeletal:  Normal gait.    Integumentary:  No pallor.    Neurologic:  No focal deficits.    Cognition and Speech:  Speech clear and coherent, Functional cognition intact.    Psychiatric:  Cooperative, Appropriate mood & affect.       Impression and Plan   Diagnosis     Sleep apnea (QHA01-QW G47.30).     Fibromyalgia (EMS80-UD M79.7).     Chronic pain (WGB17-ZI G89.29).     Chronic headache (OBD19-YO R51).     Course:  Good response to treatment.    Patient Instructions:       Counseled: Patient, Diet, Activity, Verbalized understanding, Reviewed the importance of CPAP adherence to reduce daytime sleepiness and prevent excess mortality associated with sleep apnea .    Orders     Orders (Selected)   Prescriptions  Prescribed  Norco 5 mg-325 mg oral tablet: 1 tab(s), po, q4-6 hrs, Instructions: Up to 5 tabs per day. Fill 4/18/2019, # 150 EA, 0 Refill(s), Type: Maintenance, Pharmacy: [id number], 1 tab(s) Oral q4-6 hrs,Instr:Up to 5 tabs per day. Fill 4/18/2019  Norco 5 mg-325 mg oral tablet: 1 tab(s), po, q4-6 hrs, Instructions: Up to 5 tabs per day., # 150 EA, 0 Refill(s), Type: Maintenance, Pharmacy: [id number], 1 tab(s) Oral q4-6 hrs,Instr:Up to 5 tabs per day.  eletriptan 40 mg oral tablet: = 1 tab(s) ( 40 mg ), PO, Once, PRN: for migraine headache, # 12 tab(s), 5 Refill(s), Type: Soft Stop, Pharmacy: [id number], 1 tab(s) Oral once,PRN:for migraine headache.     Diagnosis     Acute URI (KTL90-TK J06.9).     Course:  Improving.    Orders     Return if you have fever, shortness of breath, or are not improving.  .

## 2022-02-15 NOTE — PROGRESS NOTES
Patient:   PARIS FARMER            MRN: [id number]            FIN: [id number]               Age:   73 years     Sex:  Male     :  1945   Associated Diagnoses:   Sleep apnea; Fibromyalgia; Chronic pain; Chronic headache   Author:   Alexander Higginbotham MD      Visit Information      Date of Service: 2018 04:07 pm  Performing Location: [facility]  Encounter#: [id number]      Primary Care Provider (PCP):  Alexander Higginbotham MD    NPI# [id number]      Referring Provider:  Alexander Higginbotham MD    NPI# [id number]   Visit type:  Scheduled follow-up.    Accompanied by:  No one.    Source of history:  Self.    History limitation:  None.       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    Has the patients condition changed significantly since their last visit?     no  Has the patient been compliant with treatments, including non-opioid treatments?    yes  Has there been any aberrant opioid behavior since the last visit?     no  Is the total opioid dose less than 90 morphine equivalents?    yes  Is there reasonable progress toward meeting established functional goals?     remains active  Was the last drug test consistent with prescribed medications?     yes  Is another  drug test due at this visit?     no  Has the ePDMP been reviewed?     yes  Is the patient complying with their signed Controlled Substance Abuse Agreement?     yes  Based on all the above, is the patient still a candidate for chronic opioid therapy?       yes      Review of Systems   Constitutional:  No fatigue, No decreased activity, No weight loss.    Ear/Nose/Mouth/Throat:  allergy symptoms.    Respiratory:  No shortness of breath, No cough.    Cardiovascular:  No chest pain, No palpitations, No peripheral edema.    Gastrointestinal:  No nausea, No vomiting, No diarrhea, No constipation.     Musculoskeletal:  Negative except as documented in history of present illness.    Neurologic:  Negative except as documented in history of present illness.       Health Status   Allergies:    Allergic Reactions (Selected)  Severity Not Documented  Amitriptyline (Dizziness and dry mouth)  Indocin (Memory problems and disorientation)  Paxil (Anxious and trembles)  Nonallergic Reactions (Selected)  Severity Not Documented  Beta blockers (Reactive airway disease)   Medications:  (Selected)   Prescriptions  Prescribed  Norco 5 mg-325 mg oral tablet: 1 tab(s), po, q4-6 hrs, Instructions: Up to 5 daily. Fill on 1/18/2018, # 150 EA, 0 Refill(s), Type: Maintenance, Pharmacy: [id number], 1 tab(s) Oral q4-6 hrs,Instr:Up to 5 daily. Fill on 1/18/2018  Norco 5 mg-325 mg oral tablet: 1 tab(s), po, q4-6 hrs, Instructions: Up to 5 day. Fill 12/19/2018, # 150 EA, 0 Refill(s), Type: Maintenance, Pharmacy: [id number], 1 tab(s) Oral q4-6 hrs,Instr:Up to 5 day. Fill 12/19/2018  Relpax 40 mg oral tablet: See Instructions, Instructions: Take 1 tab by mouth once as needed for migraine headache may repeat dose once in 2 hrs, # 12 tab(s), 5 Refill(s), Type: Soft Stop, Pharmacy: [id number], Take 1 tab by mouth once as needed for migraine headach...  Replacment CPAP +9 cm H2o: Replacment CPAP +9 cm H2o, See Instructions, Instructions: Heated humidifier, heated tubing, filters, nasal or full face mask of choice with headgear.  Replacement cushions and supplies as needed.  Change supplies every 6 mos  Months = 99 (Lifetime),...  Documented Medications  Documented  Nasacort: 0 Refill(s), Type: Maintenance  ferrous sulfate 325 mg (65 mg elemental iron) oral tablet: 1 tab(s) ( 325 mg ), po, daily, 0 Refill(s), Type: Maintenance   Problem list:    All Problems  Acid reflux / SNOMED CT 512711682 / Confirmed  BPH associated with nocturia / SNOMED CT 9710704991 / Confirmed  Chronic headache / SNOMED CT 8780402269 /  Confirmed  Chronic pain / SNOMED CT 401576021 / Confirmed  Common migraine / SNOMED CT 57614523 / Confirmed  Fibromyalgia / SNOMED CT 23942162 / Confirmed  Glucose intolerance (impaired glucose tolerance) / SNOMED CT 47051166 / Confirmed  H/O: iron deficiency anemia / SNOMED CT 069597470 / Confirmed  IBS (irritable bowel syndrome) / SNOMED CT 05727590 / Confirmed  NSAID-induced gastric ulcer / SNOMED CT 9783167482 / Confirmed  Periodic limb movement sleep disorder / SNOMED CT 3502549594 / Confirmed  Rhinitis, chronic / SNOMED CT 973774532 / Confirmed  Sleep apnea / SNOMED CT 393339942 / Confirmed  Varicose veins / SNOMED CT 593707889 / Confirmed  Resolved: *Hospitalized@[facility] Acute bronchitis  Resolved: *Hospitalized@[facility] - NSAID gastropathy  Resolved: Depression with anxiety / SNOMED CT 919252136  Canceled: Common Migraine / ICD-9-.10      Histories   Past Medical History:    Active  Sleep apnea (881091040): Onset on 11/22/2004 at 59 years.  Comments:  9/12/2014 CDT 5:28 PM Alexander Gtz MD  [facility] RDI 30.1 with oximetry james 63%. 4/1/2005 adequate CPAP titration to 8. Optimal CPAP titration to 9 at [facility] 10/30/2009.  Chronic headache (0630226984)  Acid reflux (681867445)  IBS (irritable bowel syndrome) (07209478)  Fibromyalgia (25237406)  Periodic limb movement sleep disorder (3821939971)  Common migraine (21819426)  NSAID-induced gastric ulcer (1368557505)  H/O: iron deficiency anemia (126876826)  Rhinitis, chronic (485449912)  Resolved  *Hospitalized@[facility] Acute bronchitis: Onset on 3/7/2012 at 66 years.  Resolved.  *Hospitalized@[facility] - NSAID gastropathy: Onset on 7/25/2011 at 65 years.  Resolved.  Depression with anxiety (836825668):  Resolved.   Family History:    Colitis  Sister  [place] - Cancer  Mother: onset at 60 .     Procedure history:    Capsule endoscopy (9358705629) on 12/30/2009 at 64 Years.  Comments:  11/23/2010 3:28 PM AILYN Higginbotham MD  Alexander  negative  Colonoscopy (005836333) on 12/3/2009 at 64 Years.  Comments:  9/12/2014 5:22 PM Alexander Gtz MD  Normal    11/23/2010 3:28 PM Alexander Lewis MD  negative  Upper gastrointestinal endoscopy (428484807) on 7/17/2009 at 63 Years.  Comments:  11/23/2010 3:30 PM Alexander Lewis MD  gastritis  Esophagogastroduodenoscopy (690800569) on 5/28/2004 at 58 Years.  Comments:  9/12/2014 5:31 PM Alexander Gtz MD  Mild gastritis and incomplete Schatzki's ring  Colonoscopy (340511085) on 5/28/2004 at 58 Years.  Comments:  9/12/2014 5:29 PM Alexander Gtz MD  Normal  Cholecystectomy (53319717) in 2002 at 57 Years.  Spinal fusion (90885868) in 1995 at 50 Years.  Esophagogastroduodenoscopy (5141941120) in 1989 at 44 Years.  Endoscopy with biopsy in the month of 7/1989 at 43 Years.  Right knee arthroscopy.   Social History:        Alcohol Assessment            Current, 1 drinks/episode average.      Tobacco Assessment            Never      Substance Abuse Assessment: Denies Substance Abuse      Employment and Education Assessment            Employed                     Comments:                      12/06/2012 - Alexander Higginbotham MD                     [employer]      Home and Environment Assessment            Marital status:  (Living together).      Exercise and Physical Activity Assessment            Exercise frequency: Exercises but did not indicate how often..      Physical Examination   Vital Signs   12/14/2018 4:10 PM CST Peripheral Pulse Rate 77 bpm    Systolic Blood Pressure 134 mmHg  HI    Diastolic Blood Pressure 77 mmHg    Mean Arterial Pressure 96 mmHg    BP Site Right arm      Measurements from flowsheet : Measurements   12/14/2018 4:10 PM CST Height Measured - Standard 70 in    Weight Measured - Standard 160 lb    BSA 1.89 m2    Body Mass Index 22.96 kg/m2      General:  Alert and oriented, No acute distress.    Eye:  Normal conjunctiva.    HENT:  Normocephalic,  Normal hearing.    Musculoskeletal:  Normal gait.    Integumentary:  No pallor.    Neurologic:  No focal deficits.    Cognition and Speech:  Speech clear and coherent, Functional cognition intact.    Psychiatric:  Cooperative, Appropriate mood & affect.       Impression and Plan   Diagnosis     Sleep apnea (CZJ76-IF G47.30).     Fibromyalgia (LAW64-ML M79.7).     Chronic pain (AEL31-NY G89.29).     Chronic headache (ASG09-HM R51).     Course:  Good response to treatment.    Patient Instructions:       Counseled: Patient, Diet, Activity, Verbalized understanding, Reviewed the importance of CPAP adherence to reduce daytime sleepiness and prevent excess mortality associated with sleep apnea .    Orders     Orders (Selected)   Outpatient Orders  Ordered  Return to Clinic (Request): Return in 2 months  Prescriptions  Prescribed  Norco 5 mg-325 mg oral tablet: 1 tab(s), po, q4-6 hrs, Instructions: Up to 5 daily. Fill on 1/18/2018, # 150 EA, 0 Refill(s), Type: Maintenance, Pharmacy: [id number], 1 tab(s) Oral q4-6 hrs,Instr:Up to 5 daily. Fill on 1/18/2018  Norco 5 mg-325 mg oral tablet: 1 tab(s), po, q4-6 hrs, Instructions: Up to 5 day. Fill 12/19/2018, # 150 EA, 0 Refill(s), Type: Maintenance, Pharmacy: [id number], 1 tab(s) Oral q4-6 hrs,Instr:Up to 5 day. Fill 12/19/2018.

## 2022-02-15 NOTE — LETTER
(Inserted Image. Unable to display)         March 16, 2021      PARIS FARMER  [address], [place] [id number]          Dear PARIS,      Thank you for selecting [facility] for your healthcare needs.    Our records indicate you are due for the following services:     Follow-up office visit.    (FYI   Regarding office visits: In some instances, a video visit or telephone visit may be offered as an option.)      To schedule an appointment or if you have further questions, please contact your clinic at [phone].      Powered by [employer]    Sincerely,    Alexander Higginbotham MD, FACP

## 2022-02-15 NOTE — TELEPHONE ENCOUNTER
Order, notes and insurance card are sent to [facility] and they will contact patient and schedule.

## 2022-02-15 NOTE — PROGRESS NOTES
Patient:   PARIS FARMER            MRN: [id number]            FIN: [id number]               Age:   72 years     Sex:  Male     :  1945   Associated Diagnoses:   Common Migraine; Fibromyalgia; H/O: Iron Deficiency Anemia; Sleep apnea; Periodic Limb Movement Sleep Disorder; Glucose intolerance (impaired glucose tolerance); Acute URI   Author:   Alexander Higginbotham MD      Visit Information      Date of Service: 10/27/2017 03:34 pm  Performing Location: [facility]  Encounter#: [id number]      Primary Care Provider (PCP):  Alexander Higginbotham MD    NPI# [id number]      Referring Provider:  Alexander Higginbotham MD    NPI# [id number]   Visit type:  Scheduled follow-up.    Accompanied by:  No one.    Source of history:  Self.    History limitation:  None.       Chief Complaint   10/27/2017 3:37 PM CDT   med refills, has been coughing at night      History of Present Illness    The patient is here for refill of his [place] and Relpax.  We discussed his use of Relpax and I asked him to limit to no more than 9 per month.  The communication from the pharmacy about how much he had been using was a little confusing.  He denies more than 12 a month.  His fibromyalgia pain is well controlled with the Norco.  He has relatively frequent migraine but Relpax relieves it promptly.     I reviewed his laboratories.  His blood sugar was a little bit high and we discussed getting some regular physical activity and carbohydrate restriction.  His ferritin is still low.  He has not been taking iron regularly and we discussed starting oral iron daily again versus IV iron replacement.  I also discussed further workup of GI disease but he wishes to take oral iron only and no further workup or IV iron.  He had iron deficiency due to nonsteroidal overuse.  The patient is able to work as a  at the school, which he loves and would not be able to do that without the [place] therapy.     He has had a recent cold and cough for about a week  since school started but feels like he is getting over it.      Has the patients condition changed significantly since their last visit?     no  Has the patient been compliant with treatments, including non-opioid treatments?    yes  Has there been any aberrant opioid behavior since the last visit?     no  Is the total opioid dose less than 90 morphine equivalents?    yes  Is there reasonable progress toward meeting established functional goals?     remains active  Was the last drug test consistent with prescribed medications?     yes  Is another  drug test due at this visit?     no  Has the ePDMP been reviewed?     yes  Is the patient complying with their signed Controlled Substance Abuse Agreement?     yes  Based on all the above, is the patient still a candidate for chronic opioid therapy?       yes         Review of Systems   Constitutional:  No fatigue, No decreased activity, No weight loss.    Eye:  No recent visual problem.    Respiratory:  No shortness of breath, No sputum production, No wheezing.    Cardiovascular:  No chest pain, No palpitations, No peripheral edema.    Gastrointestinal:  No nausea, No vomiting, No diarrhea, No constipation, No heartburn, No abdominal pain.    Genitourinary:  No hematuria.    Hematology/Lymphatics:  No bruising tendency, No bleeding tendency.    Musculoskeletal:  Muscle pain, No joint pain, No muscle weakness, No gait disturbance, No joint swelling.    Neurologic:  Negative except as documented in history of present illness.    Psychiatric:  No anxiety, No depression.       Health Status   Allergies:    Allergic Reactions (Selected)  Severity Not Documented  Amitriptyline (Dizziness and dry mouth)  Indocin (Memory problems and disorientation)  Paxil (Anxious and trembles)  Nonallergic Reactions (Selected)  Severity Not Documented  Beta blockers (Reactive airway disease)   Medications:  (Selected)   Prescriptions  Prescribed  Norco 5 mg-325 mg oral tablet: 1 tab(s), po,  q4-6 hrs, Instructions: Up to 5 daily, # 150 EA, 0 Refill(s), Type: Maintenance, Pharmacy: [facility] #[id number], Fill on 7/28/17, 1 tab(s) po q4-6 hrs,Instr:Up to 5 daily  Norco 5 mg-325 mg oral tablet: 1 tab(s), po, q4-6 hrs, Instructions: Up to 5 dayl.  Fill on on 11/26/17, # 150 EA, 0 Refill(s), Type: Maintenance, Pharmacy: [facility] #[id number], 1 tab(s) po q4-6 hrs,Instr:Up to 5 dayl. Fill on on 11/26/17  Relpax 40 mg oral tablet: See Instructions, Instructions: Take 1 tab by mouth once as needed for migraine headache may repeat dose once in 2 hrs, # 12 tab(s), 5 Refill(s), Type: Soft Stop, Pharmacy: [facility] #[id number], Take 1 tab by mouth once as needed for migraine headach...  Replacment CPAP +9 cm H2o: Replacment CPAP +9 cm H2o, See Instructions, Instructions: Heated humidifier, heated tubing, filters, nasal or full face mask of choice with headgear.  Replacement cushions and supplies as needed.  Change supplies every 6 mos  Months = 99 (Lifetime),...  Slow Fe 160 mg oral tablet, extended release: 1 tab(s) ( 160 mg ), po, bid, # 30 tab(s), 0 Refill(s), Type: Maintenance, patient has supply at home (Rx)  Documented Medications  Documented  Nasacort: 0 Refill(s), Type: Maintenance   Problem list:    All Problems  Acid Reflux / ICD-9-.81 / Confirmed  Benign prostatic hypertrophy (BPH) with nocturia / SNOMED CT 0726999341 / Confirmed  Chronic Headache / ICD-9-.0 / Confirmed  Common Migraine / ICD-9-.10 / Confirmed  Fibromyalgia / SNOMED CT 31114155 / Confirmed  H/O: Iron Deficiency Anemia / ICD-9-CM V12.3 / Confirmed  IBS (Irritable Bowel Syndrome) / ICD-9-.1 / Confirmed  NSAID-Induced Gastric Ulcer / ICD-9-.90 / Confirmed  Periodic Limb Movement Sleep Disorder / ICD-9-.51 / Confirmed  Rhinitis, chronic / ICD-9-.0 / Confirmed  Sleep apnea / ICD-9-.09 / Confirmed  Varicose veins / SNOMED CT 365576861 / Confirmed  Resolved: *Hospitalized@[facility] - Acute  bronchitis  Resolved: *Hospitalized@[facility] - NSAID gastropathy  Resolved: Depression with anxiety / ICD-9-.4  Canceled: Common Migraine / ICD-9-.10      Histories   Past Medical History:    Active  Sleep apnea (786.09): Onset on 11/22/2004 at 59 years.  Comments:  9/12/2014 CDT 5:28 PM CDT - Alexander Higginbotham MD  At [facility]11/22/2004: RDI 30.1 with oximetry james 63%. 4/1/2005 adequate CPAP titration to 8. Optimal CPAP titration to 9 at [facility] 10/30/2009.  Chronic Headache (784.0)  Acid Reflux (530.81)  IBS (Irritable Bowel Syndrome) (564.1)  Fibromyalgia (80425872)  Periodic Limb Movement Sleep Disorder (327.51)  Common Migraine (346.10)  H/O: Iron Deficiency Anemia (V12.3)  Resolved  *Hospitalized@[facility] - Acute bronchitis: Onset on 3/7/2012 at 66 years.  Resolved.  *Hospitalized@[facility] - NSAID gastropathy: Onset on 7/25/2011 at 65 years.  Resolved.  Depression with anxiety (300.4):  Resolved.   Family History:    Colitis  Sister  [place] - Cancer  Mother     Procedure history:    Capsule endoscopy ([id number]) on 12/30/2009 at 64 Years.  Comments:  11/23/2010 3:28 PM - Alexander Higginbotham MD  negative  Colonoscopy ([id number]) on 12/3/2009 at 64 Years.  Comments:  9/12/2014 5:22 PM Alexander Osborne MD  Normal    11/23/2010 3:28 PM - Alexander Higginbotham MD  negative  Upper gastrointestinal endoscopy ([id number]) on 7/17/2009 at 63 Years.  Comments:  11/23/2010 3:30 PM Alexander Osborne MD  gastritis  Esophagogastroduodenoscopy ([id number]) on 5/28/2004 at 58 Years.  Comments:  9/12/2014 5:31 PM - Alexander Higginbotham MD  Mild gastritis and incomplete Schatzki's ring  Colonoscopy ([id number]) on 5/28/2004 at 58 Years.  Comments:  9/12/2014 5:29 PM - Alexander Higginbotham MD  Normal  Cholecystectomy (93692982) in 2002 at 57 Years.  Spinal fusion (61767849) in 1995 at 50 Years.  Esophagogastroduodenoscopy (3086296029) in 1989 at 44 Years.  Endoscopy with biopsy in the month of 7/1989 at 43 Years.  Right knee arthroscopy.    Social History:        Alcohol Assessment: Current      Tobacco Assessment            Never      Employment and Education Assessment            Employed                     Comments:                      12/06/2012 - Anahy MARCUS, Alexander                     jail      Home and Environment Assessment            Marital status:  (Living together).        Physical Examination   Vital Signs   10/27/2017 3:37 PM CDT Peripheral Pulse Rate 82 bpm    Systolic Blood Pressure 151 mmHg  HI    Diastolic Blood Pressure 86 mmHg    Mean Arterial Pressure 108 mmHg    BP Site Right arm      Measurements from flowsheet : Measurements   10/27/2017 3:37 PM CDT Height Measured - Standard 70 in    Weight Measured - Standard 162 lb    BSA 1.9 m2    Body Mass Index 23.24 kg/m2      General:  Alert and oriented, No acute distress.    Eye:  Normal conjunctiva.    HENT:  Normocephalic, Normal hearing, Oral mucosa is moist.    Neck:  Supple, Non-tender, No carotid bruit, No lymphadenopathy, No thyromegaly.    Respiratory:  Lungs are clear to auscultation, Respirations are non-labored.    Cardiovascular:  Normal rate, Regular rhythm, No murmur, No gallop, Normal peripheral perfusion, No edema.    Gastrointestinal:  Soft, Non-tender, Non-distended, No organomegaly.    Musculoskeletal:  Normal gait.    Integumentary:  No pallor.    Neurologic:  No focal deficits.    Cognition and Speech:  Speech clear and coherent, Functional cognition intact.    Psychiatric:  Cooperative, Appropriate mood & affect.       Review / Management   Results review:  Lab results   9/30/2017 8:57 AM CDT Sodium Level 139 mmol/L    Potassium Level 4.8 mmol/L    Chloride Level 102 mmol/L    CO2 Level 31 mmol/L    Glucose Level 109 mg/dL  HI    BUN 15 mg/dL    Creatinine Level 0.80 mg/dL    BUN/Creat Ratio NOT APPLICABLE    eGFR 91 mL/min/1.73m2    eGFR African American 105 mL/min/1.73m2    Calcium Level 9.5 mg/dL    Bilirubin Total 0.8 mg/dL    Alkaline  Phosphatase 124 unit/L  HI    AST/SGOT 24 unit/L    ALT/SGPT 17 unit/L    Protein Total 7.3 gm/dL    Albumin Level 4.1 gm/dL    Globulin 3.2    A/G Ratio 1.3    Cholesterol 163 mg/dL    Non-HDL Cholesterol 102    HDL 61 mg/dL    Cholesterol/HDL Ratio 2.7    LDL 86    Triglyceride 73 mg/dL    TSH 1.53 mIU/L    Ferritin 19 ng/mL  LOW    PSA 0.4 ng/mL    WBC 6.1    RBC 5.05    Hgb 13.8 gm/dL    Hct 42.7 %    MCV 84.6 fL    MCH 27.3 pg    MCHC 32.3 gm/dL    RDW 13.1 %    Platelet 332    MPV 9.6 fL   .       Impression and Plan   Diagnosis     Common Migraine (SSX57-ZG G43.009).     Fibromyalgia (QWD21-TP M79.7).     Course:  Good response to treatment.    Patient Instructions:       Counseled: Patient, Diet, Activity, Verbalized understanding, Reviewed the importance of CPAP adherence to reduce daytime sleepiness and prevent excess mortality associated with sleep apnea .    Orders     Orders (Selected)   Outpatient Orders  Ordered  Return to Clinic (Request): RFV: Pain, Return in 2 months  Return to Clinic (Request): RFV: Wellness, Return in November 2017  Return to Office (Request): RFV: Annual cbc, chem 14, psa, tsh, ferritin. lipids, Return in Annually in September  Prescriptions  Prescribed  Norco 5 mg-325 mg oral tablet: 1 tab(s), po, q4-6 hrs, Instructions: Up to 5 daily, # 150 EA, 0 Refill(s), Type: Maintenance, Pharmacy: [facility] #[id number], Fill on 7/28/17, 1 tab(s) po q4-6 hrs,Instr:Up to 5 daily  Norco 5 mg-325 mg oral tablet: 1 tab(s), po, q4-6 hrs, Instructions: Up to 5 dayl.  Fill on on 11/26/17, # 150 EA, 0 Refill(s), Type: Maintenance, Pharmacy: [facility] #[id number], 1 tab(s) po q4-6 hrs,Instr:Up to 5 dayl. Fill on on 11/26/17  Relpax 40 mg oral tablet: See Instructions, Instructions: Take 1 tab by mouth once as needed for migraine headache may repeat dose once in 2 hrs, # 12 tab(s), 5 Refill(s), Type: Soft Stop, Pharmacy: [facility] #[id number], Take 1 tab by mouth once as needed for migraine  headach....     Diagnosis     H/O: Iron Deficiency Anemia (HFR11-OQ Z86.2).     Course:  Unchanged.    Plan:  65 mg iron daily.    Patient Instructions:       Counseled: Patient, Regarding medications, Diet, Verbalized understanding.    Diagnosis     Sleep apnea (TCY90-BD G47.30).     Periodic Limb Movement Sleep Disorder (ZQW91-YG G47.61).     Patient Instructions:       Counseled: Patient, Verbalized understanding, Reviewed the importance of CPAP adherence to reduce daytime sleepiness and prevent excess mortality associated with sleep apnea .    Orders     Device download.     Diagnosis     Glucose intolerance (impaired glucose tolerance) (TXJ38-UZ R73.02).     Course:  Unchanged.    Patient Instructions:       Counseled: Patient, Diet, Activity, Verbalized understanding.    Diagnosis     Acute URI (AKM54-ZF J06.9).     Course:  Progressing as expected.    Orders     Return if you have fever, shortness of breath, or are not improving.  .

## 2022-02-15 NOTE — PROGRESS NOTES
Patient:   PARIS FARMER            MRN: [id number]            FIN: [id number]               Age:   73 years     Sex:  Male     :  1945   Associated Diagnoses:   Sleep apnea; Fibromyalgia; Chronic pain; Chronic headache   Author:   Alexander Higginbotham MD      Visit Information   Visit type:  Scheduled follow-up.    Accompanied by:  No one.    Source of history:  Self.    History limitation:  None.       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 HA improved with CPAP use. Getting over a URI.      The patient is here for a refill of hydrocodone.  He is still working over the summer as a , which he enjoys.  He has had good luck with his generic triptan for migraine.  He is using his CPAP.  He has been satisfied with his health.  He uses four to five hydrocodone per day typically for fibromyalgia pain which lets him continue to be active.     Has the patients condition changed significantly since their last visit?     no  Has the patient been compliant with treatments, including non-opioid treatments?    yes  Has there been any aberrant opioid behavior since the last visit?     no  Is the total opioid dose less than 90 morphine equivalents?    yes  Is there reasonable progress toward meeting established functional goals?     remains active  Was the last drug test consistent with prescribed medications?     yes  Is another  drug test due at this visit?    yes  Has the ePDMP been reviewed?     yes  Is the patient complying with their signed Controlled Substance Abuse Agreement?     yes  Based on all the above, is the patient still a candidate for chronic opioid therapy?       yes         Review of Systems   Constitutional:  No fatigue, No decreased activity, No weight loss.    Ear/Nose/Mouth/Throat:  allergy symptoms.    Respiratory:  No shortness of breath, No cough.     Cardiovascular:  No chest pain, No palpitations, No peripheral edema.    Gastrointestinal:  No nausea, No vomiting, No diarrhea, No constipation.    Musculoskeletal:  Negative except as documented in history of present illness.    Neurologic:  Negative except as documented in history of present illness.       Health Status   Allergies:    Allergic Reactions (Selected)  Severity Not Documented  Amitriptyline (Dizziness and dry mouth)  Indocin (Memory problems and disorientation)  Paxil (Anxious and trembles)  Nonallergic Reactions (Selected)  Severity Not Documented  Beta blockers (Reactive airway disease)   Medications:  (Selected)   Prescriptions  Prescribed  Norco 5 mg-325 mg oral tablet: 1 tab(s), po, q4-6 hrs, Instructions: Up to 5 tabs per day. Fill 7 18/2019, # 150 EA, 0 Refill(s), Type: Maintenance, Pharmacy: [id number], 1 tab(s) Oral q4-6 hrs,Instr:Up to 5 tabs per day. Fill 7 18/2019  Norco 5 mg-325 mg oral tablet: 1 tab(s), po, q4-6 hrs, Instructions: Up to 5 tabs per day., # 150 EA, 0 Refill(s), Type: Maintenance, Pharmacy: [id number], 1 tab(s) Oral q4-6 hrs,Instr:Up to 5 tabs per day.  Replacment CPAP +9 cm H2o: Replacment CPAP +9 cm H2o, See Instructions, Instructions: Heated humidifier, heated tubing, filters, nasal or full face mask of choice with headgear.  Replacement cushions and supplies as needed.  Change supplies every 6 mos  Months = 99 (Lifetime),...  eletriptan 40 mg oral tablet: = 1 tab(s) ( 40 mg ), PO, Once, PRN: for migraine headache, # 12 tab(s), 5 Refill(s), Type: Soft Stop, Pharmacy: [id number], 1 tab(s) Oral once,PRN:for migraine headache  Documented Medications  Documented  Nasacort: 0 Refill(s), Type: Maintenance  ferrous sulfate 325 mg (65 mg elemental iron) oral tablet: 1 tab(s) ( 325 mg ), po, daily, 0 Refill(s), Type: Maintenance,    Medications          *denotes recorded medication          Replacment CPAP +9 cm H2o: See  Instructions, Heated humidifier, heated tubing, filters, nasal or full face mask of choice with headgear.  Replacement cushions and supplies as needed.  Change supplies every 6 mos  Months = 99 (Lifetime), 1 EA, 11 Refill(s).          Norco 5 mg-325 mg oral tablet: 1 tab(s), po, q4-6 hrs, Up to 5 tabs per day., 150 EA, 0 Refill(s).          Norco 5 mg-325 mg oral tablet: 1 tab(s), po, q4-6 hrs, Up to 5 tabs per day. Fill 7 18/2019, 150 EA, 0 Refill(s).          eletriptan 40 mg oral tablet: 40 mg, 1 tab(s), PO, Once, PRN: for migraine headache, 12 tab(s), 5 Refill(s).          *ferrous sulfate 325 mg (65 mg elemental iron) oral tablet: 325 mg, 1 tab(s), po, daily, 0 Refill(s).          *Nasacort: 0 Refill(s), Type: Maintenance.     Problem list:    All Problems  Acid reflux / SNOMED CT 743198265 / Confirmed  BPH associated with nocturia / SNOMED CT 8002159698 / Confirmed  Chronic headache / SNOMED CT 4639362453 / Confirmed  Chronic pain / SNOMED CT 150006197 / Confirmed  Common migraine / SNOMED CT 73869044 / Confirmed  Fibromyalgia / SNOMED CT 27981719 / Confirmed  Glucose intolerance (impaired glucose tolerance) / SNOMED CT 48006832 / Confirmed  H/O: iron deficiency anemia / SNOMED CT 967983623 / Confirmed  IBS (irritable bowel syndrome) / SNOMED CT 28161393 / Confirmed  NSAID-induced gastric ulcer / SNOMED CT 0982749020 / Confirmed  Periodic limb movement sleep disorder / SNOMED CT 0143725673 / Confirmed  Rhinitis, chronic / SNOMED CT 419897656 / Confirmed  Sleep apnea / SNOMED CT 768792251 / Confirmed  Use of opiates for therapeutic purposes / SNOMED CT 972127508 / Confirmed  Varicose veins / SNOMED CT 660548734 / Confirmed  Resolved: *Hospitalized@[facility] - Acute bronchitis  Resolved: *Hospitalized@[facility] - NSAID gastropathy  Resolved: Depression with anxiety / SNOMED CT 973672244  Canceled: Common Migraine / ICD-9-.10      Histories   Past Medical History:    Active  Sleep apnea (856115811): Onset on  11/22/2004 at 59 years.  Comments:  9/12/2014 CDT 5:28 PM Alexander Gtz MD  [facility]: RDI 30.1 with oximetry james 63%. 4/1/2005 adequate CPAP titration to 8. Optimal CPAP titration to 9 at [facility] 10/30/2009.  Chronic headache (0321219907)  Acid reflux (748603884)  IBS (irritable bowel syndrome) (61371230)  Fibromyalgia (30934670)  Periodic limb movement sleep disorder (5042455816)  Common migraine (27452598)  NSAID-induced gastric ulcer (5099500598)  H/O: iron deficiency anemia (285629909)  Rhinitis, chronic (116245245)  Resolved  *Hospitalized@[facility] - Acute bronchitis: Onset on 3/7/2012 at 66 years.  Resolved.  *Hospitalized@[facility] - NSAID gastropathy: Onset on 7/25/2011 at 65 years.  Resolved.  Depression with anxiety (727172701):  Resolved.   Family History:    Colitis  Sister  [place] - Cancer  Mother: onset at 60 .     Procedure history:    Capsule endoscopy (1512832588) on 12/30/2009 at 64 Years.  Comments:  11/23/2010 3:28 PM Alexander Lewis MD  negative  Colonoscopy ([id number]) on 12/3/2009 at 64 Years.  Comments:  9/12/2014 5:22 PM Alexander Gtz MD  Normal    11/23/2010 3:28 PM Alexander Lewis MD  negative  Upper gastrointestinal endoscopy ([id number]) on 7/17/2009 at 63 Years.  Comments:  11/23/2010 3:30 PM Alexander Lewis MD  gastritis  Esophagogastroduodenoscopy ([id number]) on 5/28/2004 at 58 Years.  Comments:  9/12/2014 5:31 PM Alexander Gtz MD  Mild gastritis and incomplete Schatzki's ring  Colonoscopy ([id number]) on 5/28/2004 at 58 Years.  Comments:  9/12/2014 5:29 PM Alexander Gtz MD  Normal  Cholecystectomy (28263033) in 2002 at 57 Years.  Spinal fusion (67790727) in 1995 at 50 Years.  Esophagogastroduodenoscopy (0015466086) in 1989 at 44 Years.  Endoscopy with biopsy in the month of 7/1989 at 43 Years.  Right knee arthroscopy.   Social History:        Alcohol Assessment            Current, 1 drinks/episode average.      Tobacco  Assessment            Never      Substance Abuse Assessment: Denies Substance Abuse      Employment and Education Assessment            Employed                     Comments:                      12/06/2012 - Alexander Higginbotham MD                     prison      Home and Environment Assessment            Marital status:  (Living together).      Exercise and Physical Activity Assessment            Exercise frequency: Exercises but did not indicate how often..      Physical Examination   Vital Signs   6/18/2019 3:44 PM CDT Temperature Tympanic 97.2 DegF  LOW    Peripheral Pulse Rate 62 bpm    Pulse Site Radial artery    HR Method Manual    Systolic Blood Pressure 122 mmHg    Diastolic Blood Pressure 70 mmHg    Mean Arterial Pressure 87 mmHg    BP Site Right arm    BP Method Manual      Measurements from flowsheet : Measurements   6/18/2019 3:44 PM CDT Height Measured - Standard 70 in    Weight Measured - Standard 154 lb    BSA 1.86 m2    Body Mass Index 22.09 kg/m2      General:  Alert and oriented, No acute distress.    Eye:  Normal conjunctiva.    HENT:  Normocephalic, Normal hearing.    Musculoskeletal:  Normal gait.    Integumentary:  No pallor.    Neurologic:  No focal deficits.    Cognition and Speech:  Speech clear and coherent, Functional cognition intact.    Psychiatric:  Cooperative, Appropriate mood & affect.       Impression and Plan   Diagnosis     Sleep apnea (KCT37-OA G47.30).     Fibromyalgia (GHV60-AV M79.7).     Chronic pain (GNT46-CR G89.29).     Chronic headache (PWU28-ES R51).     Course:  Good response to treatment.    Patient Instructions:       Counseled: Patient, Diet, Activity, Verbalized understanding, Reviewed the importance of CPAP adherence to reduce daytime sleepiness and prevent excess mortality associated with sleep apnea .    Orders     Orders (Selected)   Outpatient Orders  Ordered  Return to Clinic (Request): Return in 2 months  Ordered (Dispatched)  Pain Management Profile 1  with Confirmation, Urine* ([facility]): Specimen Type: Urine, Collection Date: 06/18/19 15:59:00 CDT  Prescriptions  Prescribed  Norco 5 mg-325 mg oral tablet: 1 tab(s), po, q4-6 hrs, Instructions: Up to 5 tabs per day. Fill 7 18/2019, # 150 EA, 0 Refill(s), Type: Maintenance, Pharmacy: [facility] [id number], 1 tab(s) Oral q4-6 hrs,Instr:Up to 5 tabs per day. Fill 7 18/2019  Norco 5 mg-325 mg oral tablet: 1 tab(s), po, q4-6 hrs, Instructions: Up to 5 tabs per day., # 150 EA, 0 Refill(s), Type: Maintenance, Pharmacy: [facility] [id number], 1 tab(s) Oral q4-6 hrs,Instr:Up to 5 tabs per day..

## 2022-02-15 NOTE — PROGRESS NOTES
Patient:   PARIS FARMER            MRN: [id number]            FIN: [id number]               Age:   74 years     Sex:  Male     :  1945   Associated Diagnoses:   Fibromyalgia; Chronic pain; Chronic headache; Common migraine; Sleep apnea   Author:   Alexander Higginbotham MD      Visit Information   Visit type:  Scheduled follow-up.    Accompanied by:  No one.    Source of history:  Self.    History limitation:  None.       Chief Complaint   2020 9:14 AM CDT    declined recheck BP      History of Present Illness     Today's visit was conducted via telephone due to the COVID-19 pandemic. Patient's consent to telephone visit was obtained and documented.  Call Start Time:   1425  Call End Time:   1440    Pain controlled. Out of work due to COVID19. Pain controlled. Physically active. Sleeps in recliner some of the time. Naps but otherwise not sleepy.     Has the patients condition changed significantly since their last visit?     no  Has the patient been compliant with treatments, including non-opioid treatments?    yes  Has there been any aberrant opioid behavior since the last visit?     no  Is the total opioid dose less than 90 morphine equivalents?    yes, 25  Is there reasonable progress toward meeting established functional goals?     remains active  Was the last drug test consistent with prescribed medications?     yes  Is another  drug test due at this visit?    no, next in clinic visit  Has the ePDMP been reviewed?     yes  Is the patient complying with their signed Controlled Substance Abuse Agreement?     yes  Based on all the above, is the patient still a candidate for chronic opioid therapy?       yes      Review of Systems   Constitutional:  No fever, No chills, No fatigue, No decreased activity, No weight loss.    Ear/Nose/Mouth/Throat:  Nasal congestion.    Respiratory:  No shortness of breath, No cough.    Cardiovascular:  No chest pain, No palpitations, No peripheral edema.    Gastrointestinal:  No  nausea, No vomiting, No diarrhea, No constipation.    Musculoskeletal:  Negative except as documented in history of present illness.    Neurologic:  Negative except as documented in history of present illness.       Health Status   Allergies:    Allergic Reactions (Selected)  Severity Not Documented  Amitriptyline (Dizziness and dry mouth)  Indocin (Memory problems and disorientation)  Paxil (Anxious and trembles)  Nonallergic Reactions (Selected)  Severity Not Documented  Beta blockers (Reactive airway disease)   Medications:  (Selected)   Prescriptions  Prescribed  Norco 5 mg-325 mg oral tablet: 1 tab(s), po, q4-6 hrs, Instructions: Up to 5 tabs per day. Fill 7/10/2020, # 150 EA, 0 Refill(s), Type: Maintenance, Pharmacy: [facility] #[id number], 1 tab(s) Oral q4-6 hrs,Instr:Up to 5 tabs per day. Fill 7/10/2020, 70, in, 06/11/20 9:14:00 CD...  Norco 5 mg-325 mg oral tablet: 1 tab(s), po, q4-6 hrs, Instructions: Up to 5 tabs per day., # 150 EA, 0 Refill(s), Type: Maintenance, Pharmacy: [facility] #[id number], 1 tab(s) Oral q4-6 hrs,Instr:Up to 5 tabs per day., 70, in, 06/11/20 9:14:00 CDT, Height Measured, 161.2, lb,...  Replacment CPAP +9 cm H2o: Replacment CPAP +9 cm H2o, See Instructions, Instructions: Heated humidifier, heated tubing, filters, nasal or full face mask of choice with headgear.  Replacement cushions and supplies as needed.  Change supplies every 6 mos  Months = 99 (Lifetime),...  eletriptan 40 mg oral tablet: = 1 tab(s) ( 40 mg ), PO, Once, PRN: for migraine headache, # 12 tab(s), 5 Refill(s), Type: Soft Stop, Pharmacy: [facility] #[id number], 1 tab(s) Oral once,PRN:for migraine headache  Documented Medications  Documented  Nasacort: 0 Refill(s), Type: Maintenance  ferrous sulfate 325 mg (65 mg elemental iron) oral tablet: 1 tab(s) ( 325 mg ), po, daily, 0 Refill(s), Type: Maintenance,    Medications          *denotes recorded medication          Replacment CPAP +9 cm H2o: See  Instructions, Heated humidifier, heated tubing, filters, nasal or full face mask of choice with headgear.  Replacement cushions and supplies as needed.  Change supplies every 6 mos  Months = 99 (Lifetime), 1 EA, 11 Refill(s).          Norco 5 mg-325 mg oral tablet: 1 tab(s), po, q4-6 hrs, Up to 5 tabs per day., 150 EA, 0 Refill(s).          Norco 5 mg-325 mg oral tablet: 1 tab(s), po, q4-6 hrs, Up to 5 tabs per day. Fill 7/10/2020, 150 EA, 0 Refill(s).          eletriptan 40 mg oral tablet: 40 mg, 1 tab(s), PO, Once, PRN: for migraine headache, 12 tab(s), 5 Refill(s).          *ferrous sulfate 325 mg (65 mg elemental iron) oral tablet: 325 mg, 1 tab(s), po, daily, 0 Refill(s).          *Nasacort: 0 Refill(s), Type: Maintenance.       Problem list:    All Problems  Acid reflux / SNOMED CT 692321564 / Confirmed  BPH associated with nocturia / SNOMED CT 5740762592 / Confirmed  Chronic headache / SNOMED CT 4312389233 / Confirmed  Chronic pain / SNOMED CT 148555886 / Confirmed  Common migraine / SNOMED CT 58119489 / Confirmed  Fibromyalgia / SNOMED CT 45456464 / Confirmed  Glucose intolerance (impaired glucose tolerance) / SNOMED CT 77681976 / Confirmed  H/O: iron deficiency anemia / SNOMED CT 364954848 / Confirmed  IBS (irritable bowel syndrome) / SNOMED CT 34109214 / Confirmed  NSAID-induced gastric ulcer / SNOMED CT 6213048292 / Confirmed  Periodic limb movement sleep disorder / SNOMED CT 3110046989 / Confirmed  Rhinitis, chronic / SNOMED CT 181111699 / Confirmed  Sleep apnea / SNOMED CT 715254937 / Confirmed  Use of opiates for therapeutic purposes / SNOMED CT 857720245 / Confirmed  Varicose veins / SNOMED CT 835701559 / Confirmed  Resolved: *Hospitalized@[facility] - Acute bronchitis  Resolved: *Hospitalized@[facility] - NSAID gastropathy  Resolved: Depression with anxiety / SNOMED CT 821697394  Canceled: Common Migraine / ICD-9-.10      Histories   Past Medical History:    Active  Sleep apnea (357209762): Onset on  11/22/2004 at 59 years.  Comments:  9/12/2014 CDT 5:28 PM Alexander Gtz MD  [facility]: RDI 30.1 with oximetry james 63%. 4/1/2005 adequate CPAP titration to 8. Optimal CPAP titration to 9 at [facility] 10/30/2009.  Chronic headache (3111326909)  Acid reflux (440346744)  IBS (irritable bowel syndrome) (55582044)  Fibromyalgia (09698847)  Periodic limb movement sleep disorder (7025688433)  Common migraine (32880068)  NSAID-induced gastric ulcer (1642112541)  H/O: iron deficiency anemia (678651424)  Rhinitis, chronic (270643718)  Resolved  *Hospitalized@[facility] - Acute bronchitis: Onset on 3/7/2012 at 66 years.  Resolved.  *Hospitalized@[facility] - NSAID gastropathy: Onset on 7/25/2011 at 65 years.  Resolved.  Depression with anxiety (852448594):  Resolved.   Family History:    Colitis  Sister  CA - Cancer  Mother: onset at 60 .     Procedure history:    Capsule endoscopy (1260315131) on 12/30/2009 at 64 Years.  Comments:  11/23/2010 3:28 PM Alexander Lewis MD  negative  Colonoscopy (356524065) on 12/3/2009 at 64 Years.  Comments:  9/12/2014 5:22 PM Alexander Gtz MD  Normal    11/23/2010 3:28 PM Alexander Lewis MD  negative  Upper gastrointestinal endoscopy (357913486) on 7/17/2009 at 63 Years.  Comments:  11/23/2010 3:30 PM Alexander Lewis MD  gastritis  Esophagogastroduodenoscopy (767466782) on 5/28/2004 at 58 Years.  Comments:  9/12/2014 5:31 PM Alexander Gtz MD  Mild gastritis and incomplete Schatzki's ring  Colonoscopy (619999232) on 5/28/2004 at 58 Years.  Comments:  9/12/2014 5:29 PM Alexander Gtz MD  Normal  Cholecystectomy (56524888) in 2002 at 57 Years.  Spinal fusion (24481979) in 1995 at 50 Years.  Esophagogastroduodenoscopy (5856884770) in 1989 at 44 Years.  Endoscopy with biopsy in the month of 7/1989 at 43 Years.  Right knee arthroscopy.   Social History:        Alcohol Assessment            Current, 1 drinks/episode average.      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      Exercise and Physical Activity Assessment            Exercise frequency: Exercises but did not indicate how often..        Physical Examination   Home /99 and HR 74      Impression and Plan   Diagnosis     Fibromyalgia (NOU19-OL M79.7).     Chronic pain (DNI70-ZA G89.29).     Chronic headache (QYB73-YF R51).     Common migraine (IUA23-PT G43.009).     Sleep apnea (JBT50-DW G47.30).     Course:  Good response to treatment.    Patient Instructions:       Counseled: Patient, Diet, Activity, Verbalized understanding, Reviewed the importance of CPAP adherence to reduce daytime sleepiness and prevent excess mortality associated with sleep apnea .    Orders     Orders (Selected)   Outpatient Orders  Ordered  Return to Clinic (Request): [facility]: [facility], Return in 2 months, Instructions: lab before visit  Prescriptions  Prescribed  Norco 5 mg-325 mg oral tablet: 1 tab(s), po, q4-6 hrs, Instructions: Up to 5 tabs per day. Fill 7/10/2020, # 150 EA, 0 Refill(s), Type: Maintenance, Pharmacy: [facility] #[id number], 1 tab(s) Oral q4-6 hrs,Instr:Up to 5 tabs per day. Fill 7/10/2020, 70, in, 06/11/20 9:14:00 CD...  Norco 5 mg-325 mg oral tablet: 1 tab(s), po, q4-6 hrs, Instructions: Up to 5 tabs per day., # 150 EA, 0 Refill(s), Type: Maintenance, Pharmacy: [facility] #[id number], 1 tab(s) Oral q4-6 hrs,Instr:Up to 5 tabs per day., 70, in, 06/11/20 9:14:00 CDT, Height Measured, 161.2, lb,....

## 2022-02-15 NOTE — TELEPHONE ENCOUNTER
---------------------  From: Nelia Armenta CMA (eRx Pool ([id number]_[facility]))   To: JDL Message Pool ([address]_[place]);     Sent: 6/3/2021 6:16:53 AM CDT  Subject: FW: Medication Management   Due Date/Time: 6/2/2021 4:56:00 PM CDT     Notes from Pharmacy: **Patient requests 90 days supply**    #30, 5 sent 6/1/21      ------------------------------------------  From: [facility] #[id number]  To: Alexander Higginbotham MD  Sent: June 1, 2021 4:56:13 PM CDT  Subject: Medication Management  Due: May 14, 2021 8:11:22 PM CDT     ** On Hold Pending Signature **     Dispensed Drug: celecoxib (celecoxib 200 mg oral capsule), TAKE 1 CAPSULE BY MOUTH DAILY WITH SUPPER  Quantity: 90 cap(s)  Days Supply: 90  Refills: 0  Substitutions Allowed  Notes from Pharmacy: **Patient requests 90 days supply**  ---------------------------------------------------------------  From: Kelly Pittman (JDL Message Pool ([id number]_[place]))   To: Alexander Higginbotham MD;     Sent: 6/3/2021 6:53:58 AM CDT  Subject: FW: Medication Management   Due Date/Time: 6/2/2021 4:56:00 PM CDT---------------------  From: Alexander Higginbotham MD   To: [facility] #[id number]    Sent: 6/3/2021 7:36:39 AM CDT  Subject: FW: Medication Management     ** Submitted: **  Complete:celecoxib (CeleBREX 200 mg oral capsule)   Signed by Alexander Higginbotham MD  6/3/2021 12:36:00 PM [facility]    ** Approved **  celecoxib (CELECOXIB 200MG CAPSULES)  TAKE 1 CAPSULE BY MOUTH DAILY WITH SUPPER  Qty:  90 cap(s)        Days Supply:  90        Refills:  0          Substitutions Allowed     Route To Pharmacy - [facility] #[id number]   Note from Pharmacy:  **Patient requests 90 days supply**

## 2022-02-15 NOTE — NURSING NOTE
Comprehensive Intake Entered On:  6/18/2019 3:51 PM CDT    Performed On:  6/18/2019 3:44 PM CDT by Kelly Pittman               Summary   Chief Complaint :   pt here for Rx refill   Weight Measured :   154 lb(Converted to: 154 lb 0 oz, 69.85 kg)    Height Measured :   70 in(Converted to: 5 ft 10 in, 177.80 cm)    Body Mass Index :   22.09 kg/m2   Body Surface Area :   1.86 m2   Systolic Blood Pressure :   122 mmHg   Diastolic Blood Pressure :   70 mmHg   Mean Arterial Pressure :   87 mmHg   Peripheral Pulse Rate :   62 bpm   BP Site :   Right arm   Pulse Site :   Radial artery   BP Method :   Manual   HR Method :   Manual   Temperature Tympanic :   97.2 DegF(Converted to: 36.2 DegC)  (LOW)    Race :      Languages :   English   Ethnicity :   Not  or    Kelly Pittman - 6/18/2019 3:44 PM CDT   Health Status   Allergies Verified? :   Yes   Medication History Verified? :   Yes   Medical History Verified? :   Yes   Pre-Visit Planning Status :   Completed   Tobacco Use? :   Never smoker   Kelly Pittman - 6/18/2019 3:44 PM CDT   Consents   Consent for Immunization Exchange :   Consent Granted   Consent for Immunizations to Providers :   Consent Granted   Kelly Pittman - 6/18/2019 3:44 PM CDT   Meds / Allergies   (As Of: 6/18/2019 3:51:22 PM CDT)   Allergies (Active)   amitriptyline  Estimated Onset Date:   Unspecified ; Reactions:   Dizziness, Dry mouth ; Created By:   Aliya Atkins CMA; Reaction Status:   Active ; Category:   Drug ; Substance:   amitriptyline ; Type:   Allergy ; Updated By:   Aliya Atkins CMA; Source:   Paper Chart ; Reviewed Date:   6/18/2019 3:48 PM CDT      beta blockers  Estimated Onset Date:   Unspecified ; Reactions:   Reactive airway disease ; Created By:   Alexander Higginbotham MD; Reaction Status:   Active ; Category:   Drug ; Substance:   beta blockers ; Type:   Side Effect ; Updated By:   Alexander Higginbotham MD; Reviewed Date:   6/18/2019 3:48 PM  CDT      Indocin  Estimated Onset Date:   <not entered> 4/1/1992 ; Reactions:   memory problems, Disorientation ; Created By:   Aliya Atkins CMA; Reaction Status:   Active ; Category:   Drug ; Substance:   Indocin ; Type:   Allergy ; Updated By:   Aliya Atkins CMA; Source:   Paper Chart ; Reviewed Date:   6/18/2019 3:48 PM CDT      Paxil  Estimated Onset Date:   Unspecified ; Reactions:   Anxious, Trembles ; Created By:   Aliya Atkins CMA; Reaction Status:   Active ; Category:   Drug ; Substance:   Paxil ; Type:   Allergy ; Updated By:   Aliya Atkins CMA; Source:   Paper Chart ; Reviewed Date:   6/18/2019 3:48 PM CDT        Medication List   (As Of: 6/18/2019 3:51:22 PM CDT)   Prescription/Discharge Order    acetaminophen-hydrocodone  :   acetaminophen-hydrocodone ; Status:   Prescribed ; Ordered As Mnemonic:   Norco 5 mg-325 mg oral tablet ; Simple Display Line:   1 tab(s), po, q4-6 hrs, Up to 5 tabs per day. to cover till appt., 24 tab(s), 0 Refill(s) ; Ordering Provider:   Mathew Reis MD; Catalog Code:   acetaminophen-hydrocodone ; Order Dt/Tm:   6/15/2019 10:58:52 AM          eletriptan  :   eletriptan ; Status:   Prescribed ; Ordered As Mnemonic:   eletriptan 40 mg oral tablet ; Simple Display Line:   40 mg, 1 tab(s), PO, Once, PRN: for migraine headache, 12 tab(s), 5 Refill(s) ; Ordering Provider:   Alexander Higginbotham MD; Catalog Code:   eletriptan ; Order Dt/Tm:   3/19/2019 4:46:49 PM          acetaminophen-hydrocodone  :   acetaminophen-hydrocodone ; Status:   Prescribed ; Ordered As Mnemonic:   Norco 5 mg-325 mg oral tablet ; Simple Display Line:   1 tab(s), po, q4-6 hrs, Up to 5 tabs per day. Fill 4/18/2019, 150 EA, 0 Refill(s) ; Ordering Provider:   Alexander Higginbotham MD; Catalog Code:   acetaminophen-hydrocodone ; Order Dt/Tm:   3/19/2019 4:35:07 PM          Miscellaneous Rx Supply  :   Miscellaneous Rx Supply ; Status:   Prescribed ; Ordered As Mnemonic:   Replacment CPAP  +9 cm H2o ; Simple Display Line:   See Instructions, Heated humidifier, heated tubing, filters, nasal or full face mask of choice with headgear.  Replacement cushions and supplies as needed.  Change supplies every 6 mos  Months = 99 (Lifetime), 1 EA, 11 Refill(s) ; Ordering Provider:   Alexander Higginbotham MD; Catalog Code:   Miscellaneous Rx Supply ; Order Dt/Tm:   4/13/2017 3:34:47 PM            Home Meds    ferrous sulfate  :   ferrous sulfate ; Status:   Documented ; Ordered As Mnemonic:   ferrous sulfate 325 mg (65 mg elemental iron) oral tablet ; Simple Display Line:   325 mg, 1 tab(s), po, daily, 0 Refill(s) ; Catalog Code:   ferrous sulfate ; Order Dt/Tm:   1/25/2018 4:43:44 PM          triamcinolone nasal  :   triamcinolone nasal ; Status:   Documented ; Ordered As Mnemonic:   Nasacort ; Catalog Code:   triamcinolone nasal ; Order Dt/Tm:   6/17/2014 3:37:34 PM

## 2022-02-15 NOTE — TELEPHONE ENCOUNTER
---------------------  From: Marichuy Bishop (Phone Messages Pool ([id number]_[facility]))   To: CPA Exchange Message Pool ([address]_[place]);     Sent: 9/10/2020 10:13:05 AM CDT  Subject: Pain med refill      Phone Message    PCP:   JENNIFER    Time of Call: 9:26am       Person Calling:  pt  Phone number:  [phone]  Returned call at: 10:13  Last office visit and reason:  Preop 8/5/20    Note: Pt is looking for his refill of Norco. Was only given half fill last time. 8/4/20.    Med was refilled 8/10/2020 will a fill date of 7/10/20.    Please resend.---------------------  From: Mauri/Kelly OWUSU ([employer] Message Pool ([address]224_[place]))   To: Alexander Higginbotham MD;     Sent: 9/10/2020 10:14:58 AM CDT  Subject: FW: Pain med refill---------------------  From: Alexander Higginbotham MD   To: [facility] Pool ([address]_[place]);     Sent: 9/10/2020 1:34:52 PM CDT  Subject: RE: Pain med refill      Done. Needs to schedule an appointment before any further refills.LVMTCB.Pt called this morning. Return call notified pt. He expressed understanding.

## 2022-02-15 NOTE — TELEPHONE ENCOUNTER
---------------------  From: Amanda Gallegos LPN (Phone Messages Pool (32224_[facility]))   To: Phone Messages Pool (32224_[place]);     Sent: 6/1/2021 8:54:26 AM CDT  Subject: hydrocodone refill     Phone Message    PCP:   JENNIFER      Time of Call:  8:03am       Person Calling:  pt  Phone number:  [phone]    Returned call at:     Note:   Pt LM requesting refill of hydrocodone. Pt says it has been over a month since he last got a refill.    Reminder letter was sent to pt 3/16.    Returned call and informed pt he is due for appt to discuss refill. Pt says he has to work until 4 all this week and he is needing a refill soon. Transferred to scheduling.    Last office visit and reason:  2/22/21 Hospital F/U

## 2022-02-15 NOTE — NURSING NOTE
Comprehensive Intake Entered On:  7/17/2020 4:12 PM CDT    Performed On:  7/17/2020 4:01 PM CDT by Aviva Ruffin CMA               Summary   Chief Complaint :   discuss hernia, and having some feeling of depression possibly due to not sleeping due to not able to wear CPAP verbal consent given for telephone visit     Height Measured :   70 in(Converted to: 5 ft 10 in, 177.80 cm)    Height/Length Estimated :   7 in(Converted to: 0 ft 7 in, 17.78 cm)    Race :      Languages :   English   Ethnicity :   Not  or    Aviva Ruffin CMA - 7/17/2020 4:01 PM CDT   Health Status   Allergies Verified? :   Yes   Medication History Verified? :   Yes   Medical History Verified? :   No   Pre-Visit Planning Status :   Completed   Tobacco Use? :   Never smoker   Aviva Ruffin CMA - 7/17/2020 4:01 PM CDT   Consents   Consent for Immunization Exchange :   Consent Granted   Consent for Immunizations to Providers :   Consent Granted   Aviva Ruffin CMA - 7/17/2020 4:01 PM CDT   Meds / Allergies   (As Of: 7/17/2020 4:12:22 PM CDT)   Allergies (Active)   amitriptyline  Estimated Onset Date:   Unspecified ; Reactions:   Dizziness, Dry mouth ; Created By:   Aliya Atkins CMA; Reaction Status:   Active ; Category:   Drug ; Substance:   amitriptyline ; Type:   Allergy ; Updated By:   Aliya Atkins CMA; Source:   Paper Chart ; Reviewed Date:   7/17/2020 4:10 PM CDT      beta blockers  Estimated Onset Date:   Unspecified ; Reactions:   Reactive airway disease ; Created By:   Alexander Higginbotham MD; Reaction Status:   Active ; Category:   Drug ; Substance:   beta blockers ; Type:   Side Effect ; Updated By:   Alexander Higginbotham MD; Reviewed Date:   7/17/2020 4:10 PM CDT      Indocin  Estimated Onset Date:   <not entered> 4/1/1992 ; Reactions:   memory problems, Disorientation ; Created By:   Aliya Atkins CMA; Reaction Status:   Active ; Category:   Drug ; Substance:   Indocin ; Type:   Allergy ; Updated  By:   Aliya Atkins CMA; Source:   Paper Chart ; Reviewed Date:   7/17/2020 4:10 PM CDT      Paxil  Estimated Onset Date:   Unspecified ; Reactions:   Anxious, Trembles ; Created By:   Aliya Atkins CMA; Reaction Status:   Active ; Category:   Drug ; Substance:   Paxil ; Type:   Allergy ; Updated By:   Aliya Atkins CMA; Source:   Paper Chart ; Reviewed Date:   7/17/2020 4:10 PM CDT        Medication List   (As Of: 7/17/2020 4:12:22 PM CDT)   Prescription/Discharge Order    acetaminophen-hydrocodone  :   acetaminophen-hydrocodone ; Status:   Prescribed ; Ordered As Mnemonic:   Norco 5 mg-325 mg oral tablet ; Simple Display Line:   1 tab(s), po, q4-6 hrs, Up to 5 tabs per day. Fill 7/10/2020, 150 EA, 0 Refill(s) ; Ordering Provider:   Alexander Higginbotham MD; Catalog Code:   acetaminophen-hydrocodone ; Order Dt/Tm:   6/11/2020 4:04:17 PM CDT          acetaminophen-hydrocodone  :   acetaminophen-hydrocodone ; Status:   Prescribed ; Ordered As Mnemonic:   Norco 5 mg-325 mg oral tablet ; Simple Display Line:   1 tab(s), po, q4-6 hrs, Up to 5 tabs per day., 150 EA, 0 Refill(s) ; Ordering Provider:   Alexander Higginbotham MD; Catalog Code:   acetaminophen-hydrocodone ; Order Dt/Tm:   6/11/2020 4:04:09 PM CDT          eletriptan  :   eletriptan ; Status:   Prescribed ; Ordered As Mnemonic:   eletriptan 40 mg oral tablet ; Simple Display Line:   40 mg, 1 tab(s), PO, Once, PRN: for migraine headache, 12 tab(s), 5 Refill(s) ; Ordering Provider:   Alexander Higginbotham MD; Catalog Code:   eletriptan ; Order Dt/Tm:   4/7/2020 1:43:51 PM CDT          Miscellaneous Rx Supply  :   Miscellaneous Rx Supply ; Status:   Prescribed ; Ordered As Mnemonic:   Replacment CPAP +9 cm H2o ; Simple Display Line:   See Instructions, Heated humidifier, heated tubing, filters, nasal or full face mask of choice with headgear.  Replacement cushions and supplies as needed.  Change supplies every 6 mos  Months = 99 (Lifetime), 1 EA, 11 Refill(s)  ; Ordering Provider:   Alexander Higginbotham MD; Catalog Code:   Miscellaneous Rx Supply ; Order Dt/Tm:   4/13/2017 3:34:47 PM CDT            Home Meds    ferrous sulfate  :   ferrous sulfate ; Status:   Documented ; Ordered As Mnemonic:   ferrous sulfate 325 mg (65 mg elemental iron) oral tablet ; Simple Display Line:   325 mg, 1 tab(s), po, daily, 0 Refill(s) ; Catalog Code:   ferrous sulfate ; Order Dt/Tm:   1/25/2018 4:43:44 PM CST          triamcinolone nasal  :   triamcinolone nasal ; Status:   Documented ; Ordered As Mnemonic:   Nasacort ; Catalog Code:   triamcinolone nasal ; Order Dt/Tm:   6/17/2014 3:37:34 PM CDT            ID Risk Screen   Recent Travel History :   No recent travel   Family Member Travel History :   No recent travel   Other Exposure to Infectious Disease :   Unknown   Aviva Ruffin CMA - 7/17/2020 4:01 PM CDT

## 2022-02-15 NOTE — NURSING NOTE
Comprehensive Intake Entered On:  8/5/2020 12:11 PM CDT    Performed On:  8/5/2020 12:06 PM CDT by Toshia OWUSU, Felicity               Summary   Chief Complaint :   preop--surgery 8/13/2020 for ARLENE w/ Dr. Stone at [facility].  is scheduled for COVID test at [facility] Monday, still awaiting results from 7/22/2020.   Weight Measured :   156.8 lb(Converted to: 156 lb 13 oz, 71.12 kg)    Height Measured :   70 in(Converted to: 5 ft 10 in, 177.80 cm)    Body Mass Index :   22.5 kg/m2   Body Surface Area :   1.87 m2   Height/Length Estimated :   7 in(Converted to: 0 ft 7 in, 17.78 cm)    Systolic Blood Pressure :   132 mmHg (HI)    Diastolic Blood Pressure :   80 mmHg   Mean Arterial Pressure :   97 mmHg   Peripheral Pulse Rate :   83 bpm   BP Site :   Right arm   Pulse Site :   Radial artery   BP Method :   Manual   HR Method :   Manual   Temperature Tympanic :   97.4 DegF(Converted to: 36.3 DegC)  (LOW)    Oxygen Saturation :   95 %   Race :      Languages :   English   Ethnicity :   Not  or    Felicity Pope MA - 8/5/2020 12:06 PM CDT   Health Status   Allergies Verified? :   Yes   Medication History Verified? :   Yes   Medical History Verified? :   Yes   Pre-Visit Planning Status :   Completed   Tobacco Use? :   Never smoker   Felicity Pope MA - 8/5/2020 12:06 PM CDT   Consents   Consent for Immunization Exchange :   Consent Granted   Consent for Immunizations to Providers :   Consent Granted   Felicity Pope MA - 8/5/2020 12:06 PM CDT   Meds / Allergies   (As Of: 8/5/2020 12:11:55 PM CDT)   Allergies (Active)   amitriptyline  Estimated Onset Date:   Unspecified ; Reactions:   Dizziness, Dry mouth ; Created By:   Aliya Atkins CMA; Reaction Status:   Active ; Category:   Drug ; Substance:   amitriptyline ; Type:   Allergy ; Updated By:   Aliya Atkins CMA; Source:   Paper Chart ; Reviewed Date:   7/22/2020 9:43 AM CDT      beta blockers  Estimated Onset Date:    Unspecified ; Reactions:   Reactive airway disease ; Created By:   Alexander Higginbotham MD; Reaction Status:   Active ; Category:   Drug ; Substance:   beta blockers ; Type:   Side Effect ; Updated By:   Alexander Higginbotham MD; Reviewed Date:   7/22/2020 9:43 AM CDT      Indocin  Estimated Onset Date:   <not entered> 4/1/1992 ; Reactions:   memory problems, Disorientation ; Created By:   Aliya Atkins CMA; Reaction Status:   Active ; Category:   Drug ; Substance:   Indocin ; Type:   Allergy ; Updated By:   Aliya Atkins CMA; Source:   Paper Chart ; Reviewed Date:   7/22/2020 9:43 AM CDT      Paxil  Estimated Onset Date:   Unspecified ; Reactions:   Anxious, Trembles ; Created By:   Aliya Atkins CMA; Reaction Status:   Active ; Category:   Drug ; Substance:   Paxil ; Type:   Allergy ; Updated By:   Aliya Atkins CMA; Source:   Paper Chart ; Reviewed Date:   7/22/2020 9:43 AM CDT        Medication List   (As Of: 8/5/2020 12:11:55 PM CDT)   Prescription/Discharge Order    acetaminophen-hydrocodone  :   acetaminophen-hydrocodone ; Status:   Prescribed ; Ordered As Mnemonic:   Norco 5 mg-325 mg oral tablet ; Simple Display Line:   1 tab(s), po, q4-6 hrs, Up to 5 tabs per day., 35 EA, 0 Refill(s) ; Ordering Provider:   Farhan De Leon PA-C; Catalog Code:   acetaminophen-hydrocodone ; Order Dt/Tm:   8/4/2020 11:01:05 AM CDT          acetaminophen-hydrocodone  :   acetaminophen-hydrocodone ; Status:   Completed ; Ordered As Mnemonic:   Norco 5 mg-325 mg oral tablet ; Simple Display Line:   1 tab(s), po, q4-6 hrs, Up to 5 tabs per day. Fill 7/10/2020, 150 EA, 0 Refill(s) ; Ordering Provider:   Alexander Higginbotham MD; Catalog Code:   acetaminophen-hydrocodone ; Order Dt/Tm:   6/11/2020 4:04:17 PM CDT          eletriptan  :   eletriptan ; Status:   Prescribed ; Ordered As Mnemonic:   eletriptan 40 mg oral tablet ; Simple Display Line:   40 mg, 1 tab(s), PO, Once, PRN: for migraine headache, 6 tab(s), 5  Refill(s) ; Ordering Provider:   Farhan De Leon PA-C; Catalog Code:   eletriptan ; Order Dt/Tm:   8/4/2020 10:59:20 AM CDT          escitalopram  :   escitalopram ; Status:   Prescribed ; Ordered As Mnemonic:   Lexapro 10 mg oral tablet ; Simple Display Line:   10 mg, 1 tab(s), Oral, daily, 30 tab(s), 5 Refill(s) ; Ordering Provider:   Juan Francisco Jones MD; Catalog Code:   escitalopram ; Order Dt/Tm:   7/17/2020 4:14:54 PM CDT          Miscellaneous Rx Supply  :   Miscellaneous Rx Supply ; Status:   Prescribed ; Ordered As Mnemonic:   Replacment CPAP +9 cm H2o ; Simple Display Line:   See Instructions, Heated humidifier, heated tubing, filters, nasal or full face mask of choice with headgear.  Replacement cushions and supplies as needed.  Change supplies every 6 mos  Months = 99 (Lifetime), 1 EA, 11 Refill(s) ; Ordering Provider:   Alexander Higginbotham MD; Catalog Code:   Miscellaneous Rx Supply ; Order Dt/Tm:   4/13/2017 3:34:47 PM CDT          ondansetron  :   ondansetron ; Status:   Prescribed ; Ordered As Mnemonic:   Zofran ODT 8 mg oral tablet, disintegrating ; Simple Display Line:   8 mg, 1 tab(s), po, q8 hrs, PRN: for nausea/vomiting, 12 tab(s), 0 Refill(s) ; Ordering Provider:   Farhan De Leon PA-C; Catalog Code:   ondansetron ; Order Dt/Tm:   7/22/2020 10:14:18 AM CDT            Home Meds    ferrous sulfate  :   ferrous sulfate ; Status:   Documented ; Ordered As Mnemonic:   ferrous sulfate 325 mg (65 mg elemental iron) oral tablet ; Simple Display Line:   325 mg, 1 tab(s), po, daily, 0 Refill(s) ; Catalog Code:   ferrous sulfate ; Order Dt/Tm:   1/25/2018 4:43:44 PM CST          triamcinolone nasal  :   triamcinolone nasal ; Status:   Documented ; Ordered As Mnemonic:   Nasacort ; Catalog Code:   triamcinolone nasal ; Order Dt/Tm:   6/17/2014 3:37:34 PM CDT            ID Risk Screen   Recent Travel History :   No recent travel   Family Member Travel History :   No recent travel   Other Exposure to Infectious  Disease :   Unknown   Toshia OWUSU, Felicity - 8/5/2020 12:06 PM CDT   Social History   Social History   (As Of: 8/5/2020 12:11:55 PM CDT)   Alcohol:        Current, 1 drinks/episode average.   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   (Last Updated: 12/6/2012 4:53:01 PM CST by Alexander Higginbotham MD)          Exercise:        Exercise frequency: Exercises but did not indicate how often..   (Last Updated: 6/8/2018 12:38:28 PM CDT by Maria Elena Kidd)

## 2022-02-15 NOTE — RESULTS
Patient:   PARIS FARMER            MRN: [id number]            FIN: [id number]               Age:   74 years     Sex:  Male     :  1945   Associated Diagnoses:   None   Author:   Robert MARCUS, Juan Francisco      Procedure   EKG procedure   Date:  2020.     Indication: pre-operative exam.     EKG findings   Rhythm: heart rate  86  beats/min, sinus normal, heart block RBBB.     Axis: normal axis, normal configuration.     Intervals: normal.     P waves: normal.     QRS complex: Q waves present lead V2.     Interpretation: Abnormal ECG, no acute changes.

## 2022-02-15 NOTE — LETTER
(Inserted Image. Unable to display)   June 19, 2019        PARIS FARMER  [address], [place] [id number]        Dear PARIS,      Thank you for selecting [facility] (previously [facility]) for your healthcare needs.    Our records indicate you are due for the following services:    Annual Wellness Visit & Fasting Lab Tests ~ Please do not eat or drink anything 10 hours prior to your scheduled appointment time.  (Water and any medications that you may need are allowed unless directed otherwise.)    If you had your labs done at another facility or with Direct Access Lab Testing at [facility], please bring in a copy of the results to your next visit, mail a copy, or drop off a copy of your results to your Healthcare Provider.    You are due for lab work and an office visit, please schedule the lab appointment 1 week before the office visit.  This will assure all results are available to discuss with your provider during your visit.    **It is very helpful if you bring your medication bottles to your appointment.  This assures we have all of your current medications, including strength and dosing information, documented accurately in your medical record.    To schedule an appointment or if you have further questions, please contact your primary clinic:   [facility]       [phone]   [facility]       [phone]              [facility]     [phone]      Powered by Health and [employer]    Sincerely,    Alexander Higginbotham M.D., [facility]

## 2022-02-15 NOTE — TELEPHONE ENCOUNTER
---------------------  From: Rupa Charles CMA (Phone Messages Pool ([id number]_[facility]))   To: [facility] Message Pool ([id number]_[place]);     Sent: 12/3/2020 11:17:05 AM CST  Subject: phone note- Concerns     Phone Message    PCP:    JENNIFER      Time of Call:  10:42am       Person Calling:  DaughterJanessa Cheney   Phone number:  [phone] ok to leave message    Returned call at: 11:10 am    Note:  Patients daughter called stating that her father has a Px tomorrow with MALATHI, she hopes her father will let her come along because she has some concerns. If her father does not she just wanted to let us know what they are. Called oNni: States her father has terrible shakes in both arms and hands- concerned about Parkinson's. Discuss Depression/Anxiety- decreased appetite, negative thoughts/statements, wife is terminally ill. c/o itchy/soreness on butt- needs looked at. Also daughter will be sending dad with Healthcare POA form. Advise how this process works. If further questions please call Noni.---------------------  From: Mercedez Salas CMA ([facility] Message Pool ([id number]_[place]))   To: Alexander Higginbotham MD;     Sent: 12/3/2020 12:09:41 PM CST  Subject: FW: phone note- Concerns

## 2022-02-15 NOTE — LETTER
(Inserted Image. Unable to display)   August 25, 2019      PARIS FARMER      [address], [place] [id number]        Dear PARIS,    Thank you for selecting [facility] ([facility]viously [facility]) for your healthcare needs.  Below you will find the results of your recent tests done at our clinic.     Ferritin (iron stores) are low. Be sure you are taking your iron supplement (ferrous sulfate) daily.      Result Name Current Result Previous Result Reference Range   Sodium Level (mmol/L)  139 8/23/2019  138 6/2/2018 135 - 146   Potassium Level (mmol/L)  4.9 8/23/2019  4.5 6/2/2018 3.5 - 5.3   Chloride Level (mmol/L)  102 8/23/2019  102 6/2/2018 98 - 110   CO2 Level (mmol/L)  31 8/23/2019  30 6/2/2018 20 - 32   Glucose Level (mg/dL) ((H)) 103 8/23/2019 ((H)) 102 6/2/2018 65 - 99   BUN (mg/dL)  13 8/23/2019  15 6/2/2018 7 - 25   Creatinine Level (mg/dL)  0.86 8/23/2019  0.76 6/2/2018 0.70 - 1.18   eGFR (mL/min/1.73m2)  87 8/23/2019  92 6/2/2018 > OR = 60 -    Calcium Level (mg/dL)  9.6 8/23/2019  9.4 6/2/2018 8.6 - 10.3   Bilirubin Total (mg/dL)  0.9 8/23/2019  0.8 6/2/2018 0.2 - 1.2   Alkaline Phosphatase (unit/L)  83 8/23/2019 ((H)) 129 6/2/2018 40 - 115   AST/SGOT (unit/L)  22 8/23/2019  24 6/2/2018 10 - 35   ALT/SGPT (unit/L)  14 8/23/2019  19 6/2/2018 9 - 46   Protein Total (gm/dL)  6.9 8/23/2019  7.5 6/2/2018 6.1 - 8.1   Albumin Level (gm/dL)  4.0 8/23/2019  4.0 6/2/2018 3.6 - 5.1   Globulin  2.9 8/23/2019  3.5 6/2/2018 1.9 - 3.7   A/G Ratio  1.4 8/23/2019  1.1 6/2/2018 1.0 - 2.5   Cholesterol (mg/dL)  172 8/23/2019  159 6/2/2018  - <200   Non-HDL Cholesterol  109 8/23/2019  112 6/2/2018  - <130   HDL (mg/dL)  63 8/23/2019  47 6/2/2018 >40 -    Cholesterol/HDL Ratio  2.7 8/23/2019  3.4 6/2/2018  - <5.0   LDL  93 8/23/2019  91 6/2/2018    Triglyceride (mg/dL)  70 8/23/2019  112 6/2/2018  - <150   TSH (mIU/L)  2.14 8/23/2019  1.50 6/2/2018 0.40 - 4.50   Ferritin (ng/mL) ((L)) 21  8/23/2019  25 6/2/2018 24 - 380   PSA (ng/mL)  0.4 8/23/2019  0.4 6/2/2018  - < OR = 4.0   WBC  4.8 8/23/2019  4.3 6/2/2018 3.8 - 10.8   Hgb (gm/dL)  13.6 8/23/2019  13.6 6/2/2018 13.2 - 17.1   Platelet  285 8/23/2019  280 6/2/2018 140 - 400       Please contact me or my assistant at [phone] if you have any questions.     Sincerely,        Alexander Higginbotham MD

## 2022-02-15 NOTE — PROGRESS NOTES
Patient:   PARIS FARMER            MRN: [id number]            FIN: [id number]               Age:   72 years     Sex:  Male     :  1945   Associated Diagnoses:   Common Migraine; Fibromyalgia; Sleep apnea; Periodic Limb Movement Sleep Disorder   Author:   Alexander Higginbotham MD      Visit Information   Visit type:  Scheduled follow-up.    Accompanied by:  No one.    Source of history:  Self.    History limitation:  None.       Chief Complaint   2017 4:19 PM CDT    med refills      History of Present Illness    The patient is here for refill of his Norco.  He takes up to #5 a day for chronic fibromyalgia.  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  The Norco allows him to be able to be active and continue this job at age 72.     He tells me he is using his CPAP every night, he just got new equipment and a new mask.  He tried a new machine but did not like it.  I discussed the fact he needs to have it downloaded and is due for lab work as well as a wellness visit.    Has the patients condition changed significantly since their last visit?     no  Has the patient been compliant with treatments, including non-opioid treatments?    yes  Has there been any aberrant opioid behavior since the last visit?     no  Is the total opioid dose less than 90 morphine equivalents?    yes  Is there reasonable progress toward meeting established functional goals?     remains active  Was the last drug test consistent with prescribed medications?     yes  Is another  drug test due at this visit?     no  Has the ePDMP been reviewed?     yes  Is the patient complying with their signed Controlled Substance Abuse Agreement?     yes  Based on all the above, is the patient still a candidate for chronic opioid therapy?       yes         Review of Systems   Constitutional:  No fatigue, No decreased activity.    Eye:  No recent visual problem.    Respiratory:  No  shortness of breath, No cough.    Cardiovascular:  No chest pain, No palpitations, No peripheral edema.    Gastrointestinal:  No nausea, No vomiting, No diarrhea, No constipation.    Musculoskeletal:  Muscle pain, No joint pain, No muscle weakness, No gait disturbance, No joint swelling.    Neurologic:  Negative except as documented in history of present illness.    Psychiatric:  No anxiety, No depression.       Health Status   Allergies:    Allergic Reactions (Selected)  Severity Not Documented  Amitriptyline (Dizziness and dry mouth)  Indocin (Memory problems and disorientation)  Paxil (Anxious and trembles)  Nonallergic Reactions (Selected)  Severity Not Documented  Beta blockers (Reactive airway disease)   Medications:  (Selected)   Prescriptions  Prescribed  Norco 5 mg-325 mg oral tablet: 1 tab(s), po, q4-6 hrs, Instructions: Up to 5 daily, # 150 EA, 0 Refill(s), Type: Maintenance, Pharmacy: [id number], Fill on 7/28/17, 1 tab(s) po q4-6 hrs,Instr:Up to 5 daily  Norco 5 mg-325 mg oral tablet: 1 tab(s), po, q4-6 hrs, Instructions: Up to 5 dayl.  Fill on on 9/30/17, # 150 EA, 0 Refill(s), Type: Maintenance, Pharmacy: [id number], 1 tab(s) po q4-6 hrs,Instr:Up to 5 dayl. Fill on on 9/30/17  Relpax 40 mg oral tablet: See Instructions, Instructions: Take 1 tab by mouth once as needed for migraine headache may repeat dose once in 2 hrs, # 12 tab(s), 4 Refill(s), Type: Soft Stop, Pharmacy: [id number], Take 1 tab by mouth once as needed for migraine headach...  Replacment CPAP +9 cm H2o: Replacment CPAP +9 cm H2o, See Instructions, Instructions: Heated humidifier, heated tubing, filters, nasal or full face mask of choice with headgear.  Replacement cushions and supplies as needed.  Change supplies every 6 mos  Months = 99 (Lifetime),...  Slow Fe 160 mg oral tablet, extended release: 1 tab(s) ( 160 mg ), po, bid, # 30 tab(s), 0 Refill(s), Type: Maintenance, patient has supply at home  (Rx)  Documented Medications  Documented  Nasacort: 0 Refill(s), Type: Maintenance   Problem list:    All Problems  Acid Reflux / ICD-9-.81 / Confirmed  Benign prostatic hypertrophy (BPH) with nocturia / SNOMED CT 7480824351 / Confirmed  Chronic Headache / ICD-9-.0 / Confirmed  Common Migraine / ICD-9-.10 / Confirmed  Fibromyalgia / SNOMED CT 54748952 / Confirmed  H/O: Iron Deficiency Anemia / ICD-9-CM V12.3 / Confirmed  IBS (Irritable Bowel Syndrome) / ICD-9-.1 / Confirmed  NSAID-Induced Gastric Ulcer / ICD-9-.90 / Confirmed  Periodic Limb Movement Sleep Disorder / ICD-9-.51 / Confirmed  Rhinitis, chronic / ICD-9-.0 / Confirmed  Sleep apnea / ICD-9-.09 / Confirmed  Varicose veins / SNOMED CT 714343983 / Confirmed  Resolved: *Hospitalized@[facility] Acute bronchitis  Resolved: *Hospitalized@[facility] - NSAID gastropathy  Resolved: Depression with anxiety / ICD-9-.4  Canceled: Common Migraine / ICD-9-.10      Histories   Past Medical History:    Active  Sleep apnea (786.09): Onset on 11/22/2004 at 59 years.  Comments:  9/12/2014 CDT 5:28 PM CDT - Alexander Higginbotham MD  [facility]: RDI 30.1 with oximetry james 63%. 4/1/2005 adequate CPAP titration to 8. Optimal CPAP titration to 9 at [facility] 10/30/2009.  Chronic Headache (784.0)  Acid Reflux (530.81)  IBS (Irritable Bowel Syndrome) (564.1)  Fibromyalgia (89608950)  Periodic Limb Movement Sleep Disorder (327.51)  Common Migraine (346.10)  H/O: Iron Deficiency Anemia (V12.3)  Resolved  *Hospitalized@[facility] Acute bronchitis: Onset on 3/7/2012 at 66 years.  Resolved.  *Hospitalized@[facility] - NSAID gastropathy: Onset on 7/25/2011 at 65 years.  Resolved.  Depression with anxiety (300.4):  Resolved.   Family History:    Colitis  Sister  [place] - Cancer  Mother     Procedure history:    Capsule endoscopy ([id number]) on 12/30/2009 at 64 Years.  Comments:  11/23/2010 3:28 PM - Anahy MARCUS,  Aleaxnder  negative  Colonoscopy ([id number]) on 12/3/2009 at 64 Years.  Comments:  9/12/2014 5:22 PM - Alexander Higginbotham MD  Normal    11/23/2010 3:28 PM - Alexander Higginbotham MD  negative  Upper gastrointestinal endoscopy ([id number]) on 7/17/2009 at 63 Years.  Comments:  11/23/2010 3:30 PM - Alexander Higginbotham MD  gastritis  Esophagogastroduodenoscopy ([id number]) on 5/28/2004 at 58 Years.  Comments:  9/12/2014 5:31 PM - Alexander Higginbotham MD  Mild gastritis and incomplete Schatzki's ring  Colonoscopy ([id number]) on 5/28/2004 at 58 Years.  Comments:  9/12/2014 5:29 PM - Alexander Higginbotham MD  Normal  Cholecystectomy ([id number]) in 2002 at 57 Years.  Spinal fusion ([id number]) in 1995 at 50 Years.  Esophagogastroduodenoscopy ([id number]) in 1989 at 44 Years.  Endoscopy with biopsy in the month of 7/1989 at 43 Years.  Right knee arthroscopy.   Social History:        Alcohol Assessment: Current      Tobacco Assessment            Never      Employment and Education Assessment            Employed                     Comments:                      12/06/2012 - Alexander Higginbotham MD                     [employer]      Home and Environment Assessment            Marital status:  (Living together).        Physical Examination   Vital Signs   8/31/2017 4:19 PM CDT Peripheral Pulse Rate 85 bpm    Systolic Blood Pressure 124 mmHg    Diastolic Blood Pressure 80 mmHg    Mean Arterial Pressure 95 mmHg    BP Site Right arm    BP Method Electronic      Measurements from flowsheet : Measurements   8/31/2017 4:19 PM CDT Height Measured - Standard 70 in    Weight Measured - Standard 159 lb    BSA 1.89 m2    Body Mass Index 22.81 kg/m2      General:  Alert and oriented, No acute distress.    Eye:  Normal conjunctiva.    HENT:  Normocephalic, Normal hearing.    Neck:  Supple, Non-tender, No carotid bruit, No lymphadenopathy, No thyromegaly.    Respiratory:  Lungs are clear to auscultation, Respirations are non-labored.    Cardiovascular:  Normal rate,  Regular rhythm, No murmur, No gallop, Normal peripheral perfusion, No edema.    Gastrointestinal:  Soft, Non-tender.    Musculoskeletal:  Normal gait.    Integumentary:  No pallor.    Neurologic:  No focal deficits.    Cognition and Speech:  Speech clear and coherent, Functional cognition intact.    Psychiatric:  Cooperative, Appropriate mood & affect, Normal judgment, Non-suicidal.       Review / Management   Results review:  Lab results   6/8/2017 8:03 PM CDT U pH 6.07    U Creatinine 127.7 mg/dL    Pain Management Prescribed Drug 1 Hydrocodone    U Amphetamines NEGATIVE ng/mL    U Amphetamines medMATCH CONSISTENT    U Barbiturate Scrn NEGATIVE ng/mL    U Cari medMATCH CONSISTENT    U Benzodia Scrn NEGATIVE ng/mL    U Benzodia medMATCH CONSISTENT    U Cannabis Metabolite medMATCH CONSISTENT    U Cannabis Metabolite Scrn NEGATIVE ng/mL    U Cocaine Metabolite Scrn NEGATIVE ng/mL    U Cocaine Metabolite medMATCH CONSISTENT    U Codeine Scrn NEGATIVE ng/mL    U Codeine medMATCH CONSISTENT    U Hydrocodone medMATCH CONSISTENT    U Hydrocodone Scrn 1,147 ng/mL  HI    U Hydromorphone medMATCH CONSISTENT    U Hydromorphone Scrn 543 ng/mL  HI    U medMATCH Comments See comment    U Methadone Scrn NEGATIVE ng/mL    U Methadone Scrn medMATCH CONSISTENT    U Morphine medMATCH CONSISTENT    U Morphine Scrn NEGATIVE ng/mL    U Norhydrocodone 2,025 ng/mL  HI    U Norhydrocodone medMatch CONSISTENT    U Opiates Scrn POSITIVE ng/mL    U Oxidants NEGATIVE mcg/mL    U Oxycodone Scrn NEGATIVE ng/mL    U Oxycodone Scrn medMATCH CONSISTENT    U PCP Scrn NEGATIVE ng/mL    U PCP Scrn medMATCH CONSISTENT   .       Impression and Plan   Diagnosis     Common Migraine (XEK19-EC G43.009).     Fibromyalgia (FMQ54-MQ M79.7).     Course:  Good response to treatment.    Patient Instructions:       Counseled: Patient, Diet, Activity, Verbalized understanding, Reviewed the importance of CPAP adherence to reduce daytime sleepiness and prevent  excess mortality associated with sleep apnea .    Orders     Orders (Selected)   Outpatient Orders  Ordered  Return to Clinic (Request): RFV: Pain, Return in 2 months  Return to Clinic (Request): RFV: Wellness, Return in November 2017  Return to Office (Request): RFV: Annual cbc, chem 14, psa, tsh, ferritin. lipids, Return in Annually in September  Prescriptions  Prescribed  Norco 5 mg-325 mg oral tablet: 1 tab(s), po, q4-6 hrs, Instructions: Up to 5 daily, # 150 EA, 0 Refill(s), Type: Maintenance, Pharmacy: [id number], Fill on 7/28/17, 1 tab(s) po q4-6 hrs,Instr:Up to 5 daily  Norco 5 mg-325 mg oral tablet: 1 tab(s), po, q4-6 hrs, Instructions: Up to 5 dayl.  Fill on on 9/30/17, # 150 EA, 0 Refill(s), Type: Maintenance, Pharmacy: [id number], 1 tab(s) po q4-6 hrs,Instr:Up to 5 dayl. Fill on on 9/30/17.     Flu shot.     Diagnosis     Sleep apnea (KCD22-XU G47.30).     Periodic Limb Movement Sleep Disorder (FXM19-RU G47.61).     Patient Instructions:       Counseled: Patient, Verbalized understanding, Reviewed the importance of CPAP adherence to reduce daytime sleepiness and prevent excess mortality associated with sleep apnea .    Orders     Device download.

## 2022-02-15 NOTE — LETTER
(Inserted Image. Unable to display)   December 18, 2019        PARIS FARMER  [address], [place] [id number]        Dear PARIS,      Thank you for selecting [facility] for your healthcare needs.    Our records indicate you are due for the following services:     Annual Wellness Visit    To schedule an appointment or if you have further questions, please contact your primary clinic:   [facility]       [phone]   [facility]       [phone]              [facility]     [phone]      Powered by [employer]    Sincerely,    Alexander Higginbotham M.D., [facility]

## 2022-02-15 NOTE — PROGRESS NOTES
Chief Complaint    Medication management  History of Present Illness        is a 75-year-old with obstructive sleep apnea on CPAP, hypertension, fibromyalgia with chronic pain, essential tremor, depression.  He tells me that his mirtazapine was increased and that he needs a refill.  He has not been on hydrocodone for over a month and a half but wants a refill.  He has been using it to help him fall back asleep when he awakens during the night.  Does not describe a lot of difficulty with pain.  Using his CPAP most of the night he tells me he feels his depression is improved. PHQ-9 3.  Review of Systems       Headaches are controlled.  No nausea, vomiting, heartburn, abdominal pain dysphagia, rectal bleeding, melena.  No chest pain, cough, dyspnea.  No fever or chills.  Physical Exam   Vitals & Measurements    T: 97.9  F (Tympanic)  HR: 82 (Peripheral)  BP: 130/72     HT: 70 in  HT: 7 in  WT: 158.9 lb  BMI: 22.8        Patient appears comfortable and in no distress.  Alert and oriented.  Chest clear.  Heart exam regular.  No edema or murmur.  Abdomen nontender.  Has a tremor more noticeable with activity in the left more than right hand.  He tells me he saw the neurologist and told it was not Parkinson's disease.  He is in good spirits.  Assessment/Plan       Chronic pain (G89.29)          Doing well.  Will try Celebrex for pain relief and avoid narcotics given his depression.  Follow-up in a month.         Ordered:          76596 office o/p est mod 30-39 min (Charge), Quantity: 1, Chronic pain  Sleep apnea  HTN, goal below 130/80  Depression  Fibromyalgia  Essential tremor                Depression (F32.9)          Improved.         Ordered:          73389 office o/p est mod 30-39 min (Charge), Quantity: 1, Chronic pain  Sleep apnea  HTN, goal below 130/80  Depression  Fibromyalgia  Essential tremor                Essential tremor (G25.0)          Modest improvement with beta-blocker therapy.          Ordered:          83367 office o/p est mod 30-39 min (Charge), Quantity: 1, Chronic pain  Sleep apnea  HTN, goal below 130/80  Depression  Fibromyalgia  Essential tremor                Fibromyalgia (M79.7)          Improved.         Ordered:          53820 office o/p est mod 30-39 min (Charge), Quantity: 1, Chronic pain  Sleep apnea  HTN, goal below 130/80  Depression  Fibromyalgia  Essential tremor                HTN, goal below 130/80 (I10)         Controlled.         Ordered:          87574 office o/p est mod 30-39 min (Charge), Quantity: 1, Chronic pain  Sleep apnea  HTN, goal below 130/80  Depression  Fibromyalgia  Essential tremor                Sleep apnea (G47.30)          On CPAP.  Would like to get a new device so that his compliance can be monitored.         Ordered:          44045 office o/p est mod 30-39 min (Charge), Quantity: 1, Chronic pain  Sleep apnea  HTN, goal below 130/80  Depression  Fibromyalgia  Essential tremor                Orders:         celecoxib, See Instructions, Instructions: 1 cap(s) Oral daily with supper, # 30 EA, 5 Refill(s), Type: Maintenance, Pharmacy: [facility] #[id number], 1 cap(s) Oral daily with supper, 70, in, 06/01/21 16:27:00 CDT, Height Measured, 158.9, lb, 06/01/21 16:27..., (Ordered)         mirtazapine, = 1 tab(s) ( 45 mg ), Oral, hs, # 90 tab(s), 3 Refill(s), Type: Maintenance, Pharmacy: [facility] #[id number], 1 tab(s) Oral hs, 70, in, 06/01/21 16:27:00 CDT, Height Measured, 158.9, lb, 06/01/21 16:27:00 CDT, Weight Measured, (Ordered)         Return to Clinic (Request), Return in 4 weeks  Patient Information     Name:PARIS FARMER      Address:      [address], [place] [id number]     Sex:Male     YOB: 1945     [phone]     Emergency Contact:MARIBELL FARMER     MRN:[id number]     FIN:[id number]     Location:[facility]     Date of Service:06/01/2021      Primary Care Physician:       Anahy MARCUS,  Alexander, [phone]      Attending Physician:       Anahy MARCUS, Alexander, [phone]  Problem List/Past Medical History    Ongoing     Acid reflux     Anemia, unspecified     Anxiety disorder, unspecified     BPH associated with nocturia     Chronic headache     Chronic pain     Common migraine     Depression     Essential tremor     Fibromyalgia     Glucose intolerance (impaired glucose tolerance)     H/O: iron deficiency anemia     HTN, goal below 130/80     IBS (irritable bowel syndrome)     NSAID-induced gastric ulcer     Periodic limb movement sleep disorder     Rhinitis, chronic     Sleep apnea       Comments: At [facility]11/22/2004: RDI 30.1 with oximetry james 63%. 4/1/2005 adequate CPAP titration to 8. Optimal CPAP titration to 9 at [facility] 10/30/2009.     Use of opiates for therapeutic purposes     Varicose veins    Historical     *Hospitalized@[facility] - Acute bronchitis     *Hospitalized@[facility] - NSAID gastropathy     Depression with anxiety  Procedure/Surgical History     Colonoscopy (12/23/2020)      Comments: Indication: Screen.      Sedation: Propofol.      Impression: One 5 mm polyp in descending colon.  Diverticulosis in sigmoid colon.        Recommendation: NO repeat due to age..     Repair of inguinal hernia (08/13/2020)     Capsule endoscopy (12/30/2009)      Comments: negative.     Colonoscopy (12/03/2009)      Comments: Normal. negative.     Upper gastrointestinal endoscopy (07/17/2009)      Comments: gastritis.     Colonoscopy (05/28/2004)      Comments: Normal.     Esophagogastroduodenoscopy (05/28/2004)      Comments: Mild gastritis and incomplete Schatzki's ring.     Cholecystectomy (2002)     Spinal fusion (1995)     Endoscopy with biopsy (07/1989)     Esophagogastroduodenoscopy (1989)     Right knee arthroscopy  Medications    CeleBREX 200 mg oral capsule, See Instructions, 5 refills    eletriptan 40 mg oral tablet, See Instructions    ferrous sulfate 325 mg (65 mg elemental  iron) oral tablet, 325 mg= 1 tab(s), Oral, daily    mirtazapine 45 mg oral tablet, 45 mg= 1 tab(s), Oral, hs, 3 refills    Nasacort    propranolol 60 mg oral capsule, extended release, 60 mg= 1 cap(s), Oral, daily, 11 refills    Replacment CPAP +9 cm H2o, See Instructions, 11 refills  Allergies    Indocin (Disorientation, memory problems)    Paxil (Trembles, Anxious)    amitriptyline (Dizziness, Dry mouth)    beta blockers (Reactive airway disease)  Social History    Smoking Status     Never smoker     Alcohol - Current      Liquor (Hard) (1.5 oz), Daily, 4 drinks/episode average. 6 drinks/episode maximum.     Electronic Cigarette/Vaping      Electronic Cigarette Use: Never.     Employment/School      Retired, Highest education level: High school.     Exercise      Exercise frequency: Exercises but did not indicate how often..     Home/Environment      Marital status: . Spouse/Partner name: Cari -  2020 from cancer. Home equipment: CPAP/BiPAP.     Other      [place] .     Substance Abuse - Denies Substance Abuse     Tobacco - Denies Tobacco Use      Never (less than 100 in lifetime)  Family History    Alcohol abuse: Father.    CA - Cancer: Mother (Dx at 60).    Colitis: Sister.    Crohn's disease: Sister.    Parkinson disease: Sister.    Tremor: Mother.    Brother: History is negative    Sister: History is negative  Immunizations          Scheduled Immunizations          Dose Date(s)          influenza virus vaccine, inactivated          2015, 10/17/2009, 10/01/2011          pneumococcal (PCV13)          2015          pneumococcal (PPSV23)          10/04/2012          SARS-CoV-2 (COVID-19) Moderna-1273          2021          Other Immunizations          influenza virus vaccine, inactivated          2011, 10/04/2012, 10/12/2013, 10/12/2013, 2016, 2017, 10/16/2018, 10/10/2019, 10/23/2020          pneumococcal (PPSV23)          10/01/1996, 10/16/2018           ZOS, shingles          04/10/2016          influenza, H1N1, inactivated          01/26/2010

## 2022-02-15 NOTE — PROGRESS NOTES
Patient:   PARIS FARMER            MRN: [id number]            FIN: [id number]               Age:   72 years     Sex:  Male     :  1945   Associated Diagnoses:   Acute URI   Author:   Mathew Reis MD      Chief Complaint   2017 9:27 AM CST   pt here for fu with cough, had z-lalita and this helped some yet still continuing to cough, has felt lowsy after the z-lalita wore off, chills at night, and cough is much worse at night, says had sinus pain and pressure, used a nasal rinse yesturday        History of Present Illness   see chief complaint as noted above and confirmed with the patient     72 year old male here today following up with not feeling well, he was put on Z-lalita and did find improvement with his cough and chills. He felt symptoms improved for a week or so and then his symptoms seemed to return. He has a productive cough that is much worse at night with drainage from the back of his throat, he also has chills at night. He has sore throat, hoarse voice, he denies fevers, he has had troubles with sinus's. He has been using nasonex to help with some of the sinus symptoms. Yesturday he tried a saline nasal rinse and found this did help some. He say's his wife has been ill too and she is now  on antibiotics.       Review of Systems   Constitutional:  Chills, No fever.    Ear/Nose/Mouth/Throat:  Sore throat, hoarse voice .    Respiratory:  Cough, Sputum production, Wheezing, No shortness of breath.    Gastrointestinal:  No nausea, No vomiting, No diarrhea.    Integumentary:  No rash.    Neurologic:  Alert and oriented X4, No headache.             Health Status   Allergies:    Allergic Reactions (Selected)  Severity Not Documented  Amitriptyline (Dizziness and dry mouth)  Indocin (Memory problems and disorientation)  Paxil (Anxious and trembles)  Nonallergic Reactions (Selected)  Severity Not Documented  Beta blockers (Reactive airway disease)   Medications:  (Selected)   Prescriptions  Prescribed  Norco  5 mg-325 mg oral tablet: 1 tab(s), po, q4-6 hrs, Instructions: Up to 5 daily, # 150 EA, 0 Refill(s), Type: Maintenance, Pharmacy: [facility] #[id number], Fill on 7/28/17, 1 tab(s) po q4-6 hrs,Instr:Up to 5 daily  Norco 5 mg-325 mg oral tablet: 1 tab(s), po, q4-6 hrs, Instructions: Up to 5 dayl.  Fill on on 11/26/17, # 150 EA, 0 Refill(s), Type: Maintenance, Pharmacy: [facility] #[id number], 1 tab(s) po q4-6 hrs,Instr:Up to 5 dayl. Fill on on 11/26/17  Relpax 40 mg oral tablet: See Instructions, Instructions: Take 1 tab by mouth once as needed for migraine headache may repeat dose once in 2 hrs, # 12 tab(s), 5 Refill(s), Type: Soft Stop, Pharmacy: [facility] #[id number], Take 1 tab by mouth once as needed for migraine headach...  Replacment CPAP +9 cm H2o: Replacment CPAP +9 cm H2o, See Instructions, Instructions: Heated humidifier, heated tubing, filters, nasal or full face mask of choice with headgear.  Replacement cushions and supplies as needed.  Change supplies every 6 mos  Months = 99 (Lifetime),...  Slow Fe 160 mg oral tablet, extended release: 1 tab(s) ( 160 mg ), po, bid, # 30 tab(s), 0 Refill(s), Type: Maintenance, patient has supply at home (Rx)  Documented Medications  Documented  Nasacort: 0 Refill(s), Type: Maintenance   Problem list:    All Problems  Varicose veins / SNOMED CT 623000162 / Confirmed  Sleep apnea / ICD-9-.09 / Confirmed  Rhinitis, chronic / ICD-9-.0 / Confirmed  Periodic Limb Movement Sleep Disorder / ICD-9-.51 / Confirmed  NSAID-Induced Gastric Ulcer / ICD-9-.90 / Confirmed  Glucose intolerance (impaired glucose tolerance) / SNOMED CT 69550357 / Confirmed  IBS (Irritable Bowel Syndrome) / ICD-9-.1 / Confirmed  H/O: Iron Deficiency Anemia / ICD-9-CM V12.3 / Confirmed  Fibromyalgia / SNOMED CT 04835331 / Confirmed  Common Migraine / ICD-9-.10 / Confirmed  Chronic Headache / ICD-9-.0 / Confirmed  Benign prostatic hypertrophy (BPH) with nocturia /  [facility] CT 5417890291 / Confirmed  Acid Reflux / ICD-9-.81 / Confirmed  Resolved: Depression with anxiety / ICD-9-.4  Resolved: *Hospitalized@[facility] - NSAID gastropathy  Resolved: *Hospitalized@[facility] - Acute bronchitis  Canceled: Common Migraine / ICD-9-.10      Histories   Past Medical History:    Active  Sleep apnea (786.09): Onset on 11/22/2004 at 59 years.  Comments:  9/12/2014 CDT 5:28 PM CDT - Alexander Higginbotham MD  [facility]: RDI 30.1 with oximetry james 63%. 4/1/2005 adequate CPAP titration to 8. Optimal CPAP titration to 9 at [facility] 10/30/2009.  Chronic Headache (784.0)  Acid Reflux (530.81)  IBS (Irritable Bowel Syndrome) (564.1)  Fibromyalgia (03896003)  Periodic Limb Movement Sleep Disorder (327.51)  Common Migraine (346.10)  H/O: Iron Deficiency Anemia (V12.3)  Resolved  *Hospitalized@[facility] - Acute bronchitis: Onset on 3/7/2012 at 66 years.  Resolved.  *Hospitalized@[facility] - NSAID gastropathy: Onset on 7/25/2011 at 65 years.  Resolved.  Depression with anxiety (300.4):  Resolved.   Family History:    Colitis  Sister  CA - Cancer  Mother     Procedure history:    Capsule endoscopy ([id number]) on 12/30/2009 at 64 Years.  Comments:  11/23/2010 3:28 PM Alexander Osborne MD  negative  Colonoscopy ([id number]) on 12/3/2009 at 64 Years.  Comments:  9/12/2014 5:22 PM - Alexander Higginbotham MD  Normal    11/23/2010 3:28 PM - Alexander Higginbotham MD  negative  Upper gastrointestinal endoscopy ([id number]) on 7/17/2009 at 63 Years.  Comments:  11/23/2010 3:30 PM - Alexander Higginbotham MD  gastritis  Esophagogastroduodenoscopy ([id number]) on 5/28/2004 at 58 Years.  Comments:  9/12/2014 5:31 PM - Alexander Higginbotham MD  Mild gastritis and incomplete Schatzki's ring  Colonoscopy ([id number]) on 5/28/2004 at 58 Years.  Comments:  9/12/2014 5:29 PM - Anahy MARCUS, Alexander  Normal  Cholecystectomy ([id number]) in 2002 at 57 Years.  Spinal fusion ([id number]) in 1995 at 50 Years.  Esophagogastroduodenoscopy  ([id number]) in 1989 at 44 Years.  Endoscopy with biopsy in the month of 7/1989 at 43 Years.  Right knee arthroscopy.   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        Physical Examination   Vital Signs   11/27/2017 9:27 AM CST Temperature Tympanic 97.7 DegF  LOW    Peripheral Pulse Rate 85 bpm    Pulse Site Radial artery    Systolic Blood Pressure 130 mmHg    Diastolic Blood Pressure 80 mmHg    Mean Arterial Pressure 97 mmHg    BP Site Right arm    Oxygen Saturation 97 %      Measurements from flowsheet : Measurements   11/27/2017 9:27 AM CST Height Measured - Standard 70 in    Height/Length Estimated 7 in    Weight Measured - Standard 161 lb    BSA 1.9 m2    Body Mass Index 23.1 kg/m2      General:  Alert and oriented, No acute distress.    Eye:  Pupils are equal, round and reactive to light, Normal conjunctiva.    HENT:  Oral mucosa is moist.    Neck:  Supple.    Respiratory:  Rhonci lung sound .    Cardiovascular:  Normal rate, Regular rhythm, No edema.    Gastrointestinal:  Non-distended.    Musculoskeletal:  Normal gait.    Integumentary:  Warm, No rash.    Psychiatric:  Cooperative, Appropriate mood & affect, Normal judgment.       Review / Management   Results review      Impression and Plan       Diagnosis     Acute URI (UIT27-GS J06.9).     Plan:  Will send in Levaquin.     Please continue to drink plenty of fluids and get rest.     please return if symptoms worsen or new symptoms come.  .    Summary:  Reviewed expected cause with patient    What to watch for     and when to return  .    Connie HUDSON Medical Assistant acted solely as a scribe for, and in presence of Dr. Mathew Reis  who performed the services.

## 2022-02-15 NOTE — PROGRESS NOTES
Patient:   PARIS FARMER            MRN: [id number]            FIN: [id number]               Age:   74 years     Sex:  Male     :  1945   Associated Diagnoses:   Sleep apnea; Fibromyalgia; Chronic pain; Chronic headache; Common migraine; Acute URI   Author:   Alexander Higginbotham MD      Visit Information   Visit type:  Scheduled follow-up.    Accompanied by:  No one.    Source of history:  Self.    History limitation:  None.       Chief Complaint   2019 4:29 PM CST   Patient is here today for Rx refill and c/o cold x 1 month, coughs at night a lot      History of Present Illness    The patient is getting over a cold and has had a cough for about two weeks; the fever and chills resolved.  He still has a little bit of rhinitis and postnasal drainage.  The cough is worse at night.  We discussed treatment options but he does not really wish anything for it.  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  He gets aches and pains but is able to do all the activities required for his job, which is physical.  He needs refill of his migraine medication.      Has the patients condition changed significantly since their last visit?     no  Has the patient been compliant with treatments, including non-opioid treatments?    yes  Has there been any aberrant opioid behavior since the last visit?     no  Is the total opioid dose less than 90 morphine equivalents?    yes  Is there reasonable progress toward meeting established functional goals?     remains active  Was the last drug test consistent with prescribed medications?     yes  Is another  drug test due at this visit?    no  Has the ePDMP been reviewed?     yes  Is the patient complying with their signed Controlled Substance Abuse Agreement?     yes  Based on all the above, is the patient still a candidate for chronic opioid therapy?       yes         Review of Systems   Constitutional:  No fever, No chills, No  fatigue, No decreased activity, No weight loss.    Ear/Nose/Mouth/Throat:  Negative except as documented in history of present illness.    Respiratory:  Negative except as documented in history of present illness.    Cardiovascular:  No chest pain, No palpitations, No peripheral edema.    Gastrointestinal:  No nausea, No vomiting, No diarrhea, No constipation.    Musculoskeletal:  Negative except as documented in history of present illness.    Neurologic:  Negative except as documented in history of present illness.       Health Status   Allergies:    Allergic Reactions (Selected)  Severity Not Documented  Amitriptyline (Dizziness and dry mouth)  Indocin (Memory problems and disorientation)  Paxil (Anxious and trembles)  Nonallergic Reactions (Selected)  Severity Not Documented  Beta blockers (Reactive airway disease)   Medications:  (Selected)   Prescriptions  Prescribed  Norco 5 mg-325 mg oral tablet: 1 tab(s), po, q4-6 hrs, Instructions: Up to 5 tabs per day. Fill 1/11/2020, # 150 EA, 0 Refill(s), Type: Maintenance, Pharmacy: [facility] #[id number], 1 tab(s) Oral q4-6 hrs,Instr:Up to 5 tabs per day. Fill 1/11/2020  Norco 5 mg-325 mg oral tablet: 1 tab(s), po, q4-6 hrs, Instructions: Up to 5 tabs per day., # 150 EA, 0 Refill(s), Type: Maintenance, Pharmacy: [facility] #[id number], 1 tab(s) Oral q4-6 hrs,Instr:Up to 5 tabs per day.  Replacment CPAP +9 cm H2o: Replacment CPAP +9 cm H2o, See Instructions, Instructions: Heated humidifier, heated tubing, filters, nasal or full face mask of choice with headgear.  Replacement cushions and supplies as needed.  Change supplies every 6 mos  Months = 99 (Lifetime),...  eletriptan 40 mg oral tablet: = 1 tab(s) ( 40 mg ), PO, Once, PRN: for migraine headache, # 12 tab(s), 5 Refill(s), Type: Soft Stop, Pharmacy: [facility] #[id number], 1 tab(s) Oral once,PRN:for migraine headache  Documented Medications  Documented  Nasacort: 0 Refill(s), Type:  Maintenance  ferrous sulfate 325 mg (65 mg elemental iron) oral tablet: 1 tab(s) ( 325 mg ), po, daily, 0 Refill(s), Type: Maintenance,    Medications          *denotes recorded medication          Replacment CPAP +9 cm H2o: See Instructions, Heated humidifier, heated tubing, filters, nasal or full face mask of choice with headgear.  Replacement cushions and supplies as needed.  Change supplies every 6 mos  Months = 99 (Lifetime), 1 EA, 11 Refill(s).          Norco 5 mg-325 mg oral tablet: 1 tab(s), po, q4-6 hrs, Up to 5 tabs per day., 150 EA, 0 Refill(s).          Norco 5 mg-325 mg oral tablet: 1 tab(s), po, q4-6 hrs, Up to 5 tabs per day. Fill 1/11/2020, 150 EA, 0 Refill(s).          eletriptan 40 mg oral tablet: 40 mg, 1 tab(s), PO, Once, PRN: for migraine headache, 12 tab(s), 5 Refill(s).          *ferrous sulfate 325 mg (65 mg elemental iron) oral tablet: 325 mg, 1 tab(s), po, daily, 0 Refill(s).          *Nasacort: 0 Refill(s), Type: Maintenance.       Problem list:    All Problems  Acid reflux / SNOMED CT 942470598 / Confirmed  BPH associated with nocturia / SNOMED CT 6607161836 / Confirmed  Chronic headache / SNOMED CT 1993177746 / Confirmed  Chronic pain / SNOMED CT 580918220 / Confirmed  Common migraine / SNOMED CT 16353207 / Confirmed  Fibromyalgia / SNOMED CT 94814221 / Confirmed  Glucose intolerance (impaired glucose tolerance) / SNOMED CT 00710395 / Confirmed  H/O: iron deficiency anemia / SNOMED CT 641829605 / Confirmed  IBS (irritable bowel syndrome) / SNOMED CT 75382636 / Confirmed  NSAID-induced gastric ulcer / SNOMED CT 3829434587 / Confirmed  Periodic limb movement sleep disorder / SNOMED CT 2115594768 / Confirmed  Rhinitis, chronic / SNOMED CT 134940581 / Confirmed  Sleep apnea / SNOMED CT 367989200 / Confirmed  Use of opiates for therapeutic purposes / SNOMED CT 693238067 / Confirmed  Varicose veins / SNOMED CT 317920948 / Confirmed  Resolved: *Hospitalized@[facility] - Acute bronchitis  Resolved:  *Hospitalized@[facility] - NSAID gastropathy  Resolved: Depression with anxiety / SNOMED CT 448996343  Canceled: Common Migraine / ICD-9-.10      Histories   Past Medical History:    Active  Sleep apnea (146396051): Onset on 11/22/2004 at 59 years.  Comments:  9/12/2014 CDT 5:28 PM Alexander Gtz MD  At [facility]11/22/2004: RDI 30.1 with oximetry james 63%. 4/1/2005 adequate CPAP titration to 8. Optimal CPAP titration to 9 at [facility] 10/30/2009.  Chronic headache (2480205165)  Acid reflux (962517655)  IBS (irritable bowel syndrome) (15098798)  Fibromyalgia (87157840)  Periodic limb movement sleep disorder (9405146427)  Common migraine (06654736)  NSAID-induced gastric ulcer (5893842432)  H/O: iron deficiency anemia (858823897)  Rhinitis, chronic (987745567)  Resolved  *Hospitalized@[facility] - Acute bronchitis: Onset on 3/7/2012 at 66 years.  Resolved.  *Hospitalized@[facility] - NSAID gastropathy: Onset on 7/25/2011 at 65 years.  Resolved.  Depression with anxiety (220546575):  Resolved.   Family History:    Colitis  Sister  CA - Cancer  Mother: onset at 60 .     Procedure history:    Capsule endoscopy (2310906737) on 12/30/2009 at 64 Years.  Comments:  11/23/2010 3:28 PM Alexander Lewis MD  negative  Colonoscopy (643521541) on 12/3/2009 at 64 Years.  Comments:  9/12/2014 5:22 PM Alexander Gtz MD  Normal    11/23/2010 3:28 PM Alexander Lewis MD  negative  Upper gastrointestinal endoscopy (573747375) on 7/17/2009 at 63 Years.  Comments:  11/23/2010 3:30 PM Alexander Lewis MD  gastritis  Esophagogastroduodenoscopy (274721963) on 5/28/2004 at 58 Years.  Comments:  9/12/2014 5:31 PM Alexander Gtz MD  Mild gastritis and incomplete Schatzki's ring  Colonoscopy (520882237) on 5/28/2004 at 58 Years.  Comments:  9/12/2014 5:29 PM CDT - Anahy MARCUS, Alexander  Normal  Cholecystectomy (29026315) in 2002 at 57 Years.  Spinal fusion (86007307) in 1995 at 50  Years.  Esophagogastroduodenoscopy (7152614239) in 1989 at 44 Years.  Endoscopy with biopsy in the month of 7/1989 at 43 Years.  Right knee arthroscopy.   Social History:        Alcohol Assessment            Current, 1 drinks/episode average.      Tobacco Assessment            Never      Substance Abuse Assessment: Denies Substance Abuse      Employment and Education Assessment            Employed                     Comments:                      12/06/2012 - Anahy MARCUS, Alexander                     long-term      Home and Environment Assessment            Marital status:  (Living together).      Exercise and Physical Activity Assessment            Exercise frequency: Exercises but did not indicate how often..        Physical Examination   Vital Signs   12/12/2019 4:29 PM CST Temperature Tympanic 97.9 DegF    Peripheral Pulse Rate 82 bpm    HR Method Electronic    Systolic Blood Pressure 112 mmHg    Diastolic Blood Pressure 62 mmHg    Mean Arterial Pressure 79 mmHg    BP Site Right arm    BP Method Manual      Measurements from flowsheet : Measurements   12/12/2019 4:29 PM CST Height Measured - Standard 70 in    Weight Measured - Standard 157.0 lb    BSA 1.87 m2    Body Mass Index 22.52 kg/m2      General:  Alert and oriented, No acute distress.    HENT:  Normocephalic, Normal hearing, Oral mucosa is moist, No pharyngeal erythema.    Neck:  Supple, Non-tender, No carotid bruit, No lymphadenopathy, No thyromegaly.    Respiratory:  Lungs are clear to auscultation, Respirations are non-labored.    Cardiovascular:  Normal rate, Regular rhythm, No murmur, No gallop.    Musculoskeletal:  Normal gait.    Integumentary:  No pallor.    Neurologic:  No focal deficits.    Cognition and Speech:  Speech clear and coherent, Functional cognition intact.    Psychiatric:  Cooperative, Appropriate mood & affect.       Impression and Plan   Diagnosis     Sleep apnea (YNK40-NY G47.30).     Fibromyalgia (VGA51-UD M79.7).      Chronic pain (HMA89-XW G89.29).     Chronic headache (NDN39-TU R51).     Common migraine (LNN65-QM G43.009).     Course:  Good response to treatment.    Patient Instructions:       Counseled: Patient, Diet, Activity, Verbalized understanding, Reviewed the importance of CPAP adherence to reduce daytime sleepiness and prevent excess mortality associated with sleep apnea .    Orders     Orders (Selected)   Prescriptions  Prescribed  Norco 5 mg-325 mg oral tablet: 1 tab(s), po, q4-6 hrs, Instructions: Up to 5 tabs per day. Fill 1/11/2020, # 150 EA, 0 Refill(s), Type: Maintenance, Pharmacy: [facility] #[id number], 1 tab(s) Oral q4-6 hrs,Instr:Up to 5 tabs per day. Fill 1/11/2020  Norco 5 mg-325 mg oral tablet: 1 tab(s), po, q4-6 hrs, Instructions: Up to 5 tabs per day., # 150 EA, 0 Refill(s), Type: Maintenance, Pharmacy: [facility] #[id number], 1 tab(s) Oral q4-6 hrs,Instr:Up to 5 tabs per day.  eletriptan 40 mg oral tablet: = 1 tab(s) ( 40 mg ), PO, Once, PRN: for migraine headache, # 12 tab(s), 5 Refill(s), Type: Soft Stop, Pharmacy: [facility] #[id number], 1 tab(s) Oral once,PRN:for migraine headache.     Diagnosis     Acute URI (FZT55-OG J06.9).     Course:  Progressing as expected.

## 2022-02-15 NOTE — NURSING NOTE
Comprehensive Intake Entered On:  7/22/2020 9:44 AM CDT    Performed On:  7/22/2020 9:41 AM CDT by Maddie Tanner CMA               Summary   Chief Complaint :   verbal consent for telephone visit to discuss lightheadedness and not able to keep food down... just started antidepressant but has since stopped it...would maybe like COVID test   Height Measured :   70 in(Converted to: 5 ft 10 in, 177.80 cm)    Height/Length Estimated :   7 in(Converted to: 0 ft 7 in, 17.78 cm)    Race :      Languages :   English   Ethnicity :   Not  or    Maddie Tanner CMA - 7/22/2020 9:41 AM CDT   Health Status   Allergies Verified? :   Yes   Medication History Verified? :   Yes   Medical History Verified? :   Yes   Tobacco Use? :   Never smoker   Maddie Tanner CMA - 7/22/2020 9:41 AM CDT   Consents   Consent for Immunization Exchange :   Consent Granted   Consent for Immunizations to Providers :   Consent Granted   Maddie Tanner CMA - 7/22/2020 9:41 AM CDT   Meds / Allergies   (As Of: 7/22/2020 9:44:39 AM CDT)   Allergies (Active)   amitriptyline  Estimated Onset Date:   Unspecified ; Reactions:   Dizziness, Dry mouth ; Created By:   Aliya Marcano; Reaction Status:   Active ; Category:   Drug ; Substance:   amitriptyline ; Type:   Allergy ; Updated By:   Aliya Marcano; Source:   Paper Chart ; Reviewed Date:   7/22/2020 9:43 AM CDT      beta blockers  Estimated Onset Date:   Unspecified ; Reactions:   Reactive airway disease ; Created By:   Alexander Higginbotham MD; Reaction Status:   Active ; Category:   Drug ; Substance:   beta blockers ; Type:   Side Effect ; Updated By:   Alexander Higginbotham MD; Reviewed Date:   7/22/2020 9:43 AM CDT      Indocin  Estimated Onset Date:   <not entered> 4/1/1992 ; Reactions:   memory problems, Disorientation ; Created By:   Aliya Marcano; Reaction Status:   Active ; Category:   Drug ; Substance:   Indocin ; Type:   Allergy ; Updated By:   Jeet  Aliya; Source:   Paper Chart ; Reviewed Date:   7/22/2020 9:43 AM CDT      Paxil  Estimated Onset Date:   Unspecified ; Reactions:   Anxious, Trembles ; Created By:   Aliya Marcano; Reaction Status:   Active ; Category:   Drug ; Substance:   Paxil ; Type:   Allergy ; Updated By:   Aliya Marcano; Source:   Paper Chart ; Reviewed Date:   7/22/2020 9:43 AM CDT        Medication List   (As Of: 7/22/2020 9:44:39 AM CDT)   Prescription/Discharge Order    acetaminophen-hydrocodone  :   acetaminophen-hydrocodone ; Status:   Prescribed ; Ordered As Mnemonic:   Norco 5 mg-325 mg oral tablet ; Simple Display Line:   1 tab(s), po, q4-6 hrs, Up to 5 tabs per day. Fill 7/10/2020, 150 EA, 0 Refill(s) ; Ordering Provider:   Alexander Higginbotham MD; Catalog Code:   acetaminophen-hydrocodone ; Order Dt/Tm:   6/11/2020 4:04:17 PM CDT          acetaminophen-hydrocodone  :   acetaminophen-hydrocodone ; Status:   Prescribed ; Ordered As Mnemonic:   Norco 5 mg-325 mg oral tablet ; Simple Display Line:   1 tab(s), po, q4-6 hrs, Up to 5 tabs per day., 150 EA, 0 Refill(s) ; Ordering Provider:   Alexander Higginbotham MD; Catalog Code:   acetaminophen-hydrocodone ; Order Dt/Tm:   6/11/2020 4:04:09 PM CDT          eletriptan  :   eletriptan ; Status:   Prescribed ; Ordered As Mnemonic:   eletriptan 40 mg oral tablet ; Simple Display Line:   40 mg, 1 tab(s), PO, Once, PRN: for migraine headache, 12 tab(s), 5 Refill(s) ; Ordering Provider:   Alexander Higginbotham MD; Catalog Code:   eletriptan ; Order Dt/Tm:   4/7/2020 1:43:51 PM CDT          escitalopram  :   escitalopram ; Status:   Prescribed ; Ordered As Mnemonic:   Lexapro 10 mg oral tablet ; Simple Display Line:   10 mg, 1 tab(s), Oral, daily, 30 tab(s), 5 Refill(s) ; Ordering Provider:   Alexander Higginbotham MD; Catalog Code:   escitalopram ; Order Dt/Tm:   7/17/2020 4:14:54 PM CDT          Miscellaneous Rx Supply  :   Miscellaneous Rx Supply ; Status:   Prescribed ; Ordered As Mnemonic:    Replacment CPAP +9 cm H2o ; Simple Display Line:   See Instructions, Heated humidifier, heated tubing, filters, nasal or full face mask of choice with headgear.  Replacement cushions and supplies as needed.  Change supplies every 6 mos  Months = 99 (Lifetime), 1 EA, 11 Refill(s) ; Ordering Provider:   Alexander Higginbotham MD; Catalog Code:   Miscellaneous Rx Supply ; Order Dt/Tm:   4/13/2017 3:34:47 PM CDT            Home Meds    ferrous sulfate  :   ferrous sulfate ; Status:   Documented ; Ordered As Mnemonic:   ferrous sulfate 325 mg (65 mg elemental iron) oral tablet ; Simple Display Line:   325 mg, 1 tab(s), po, daily, 0 Refill(s) ; Catalog Code:   ferrous sulfate ; Order Dt/Tm:   1/25/2018 4:43:44 PM CST          triamcinolone nasal  :   triamcinolone nasal ; Status:   Documented ; Ordered As Mnemonic:   Nasacort ; Catalog Code:   triamcinolone nasal ; Order Dt/Tm:   6/17/2014 3:37:34 PM CDT            ID Risk Screen   Recent Travel History :   No recent travel   Family Member Travel History :   No recent travel   Other Exposure to Infectious Disease :   Unknown   Maddie Tanner CMA - 7/22/2020 9:41 AM CDT

## 2022-02-15 NOTE — NURSING NOTE
Depression Screening Entered On:  6/1/2021 5:55 PM CDT    Performed On:  6/1/2021 5:54 PM CDT by Kelly Pittman               Depression Screening   Little Interest - Pleasure in Activities :   Not at all   Feeling Down, Depressed, Hopeless :   Several days   Initial Depression Screen Score :   1 Score   Poor Appetite or Overeating :   Several days   Trouble Falling or Staying Asleep :   Not at all   Feeling Tired or Little Energy :   Not at all   Feeling Bad About Yourself :   Several days   Trouble Concentrating :   Not at all   Moving or Speaking Slowly :   Not at all   Thoughts Better Off Dead or Hurting Self :   Not at all   Difficulty at Work, Home, Getting Along :   Not difficult at all   Detailed Depression Screen Score :   2    Total Depression Screen Score :   3    Kelly Pittman - 6/1/2021 5:54 PM CDT

## 2022-02-15 NOTE — TELEPHONE ENCOUNTER
---------------------  From: Natalie Rogers (Phone Messages Pool ([id number]))   To: Phone Messages [employer] ([id number]);     Sent: 7/23/2020 10:54:03 AM CDT  Subject: depression/hernia     PCP:   JENNIFER      Time of Call:  1009       Person Calling:  pt  Phone number:  [phone]    Returned call at: 1047    Note:   LM stating he would like to talk to JDL about his depression and hernia, just saw JDL on 7/17/20 for depression and hernia. Pt was started on Lexapro and referred to surgery for his hernia. Pt states he has been able to eat because he can't keep food down. He saw DW[facility] on 7/22/20 and he had him stop his lexapro for awhile and prescribed zofran and tested for COVID- he states he is not sure what to do.    I called pt back to get more info- it sounded like the phone was answered but couldn't hear anything and then call was ended. Will try again later.     Last office visit and reason:  ^---------------------  From: Natalie Rogers (Phone Messages Pool ([id number]))   To: Alexander Higginbotham MD;     Cc: Referral Coordinators Pool ([id number]);      Sent: 7/23/2020 11:48:10 AM CDT  Subject: FW: depression/hernia     Pt called back- he is concerned that he is not on anything for depression because that is his main concern right now- no thoughts of suicide or hurting himself but he feels down, doesn't feel like doing anything, and is jittery. States the pharmacist told him that it might take a couple weeks for the lexapro to work, and he feels like he needs something with more of an immediate effect. He stopped the lexapro yesterday per DWG d/t nausea and is taking trev seltzer and the zofran. He has not thrown up today but has not eaten anything yet because he doesn't feel like it. He is going to try to eat something at noon. Also states he hasn't heard anything about surgery for his hernia- informed him I would reach out to our referral dept and ask them to look  into it.     Also awaiting COVID results.    I informed him that JDL is out of clinic, but at the hospital so I will send a message to see if he responds with any recommendations- if JDL does not respond within 2 hours I will consult w/ another provider and call him back.---------------------  From: Alexander Higginbotham MD   To: Phone Messages Pool ([id number]_[place] [facility] [place]);     Sent: 7/23/2020 12:00:49 PM CDT  Subject: RE: depression/hernia     Needs to be seen.---------------------  From: Natalie Rogers (Phone Messages Pool ([id number] [place]))   To: Alexander Higginbotham MD;     Sent: 7/23/2020 12:20:29 PM CDT  Subject: RE: depression/hernia     would video visit be okay since he is awaiting COVID results?---------------------  From: Alexander Higginbotham MD   To: Phone Messages Pool ([id number]_[place] [facility] [place]);     Sent: 7/23/2020 12:40:56 PM CDT  Subject: RE: depression/hernia     sureCalled patient back re: surgery consult, let him know they tried calling 7/21/20 and gave him number to call directly to schedule vs waiting for them to try him again.I called pt- he doesn't really want an appt just wants to know what to do about the Lexapro- I did tell him that DWG did say to wait atleast a week after sxs pass and can start with 1/2 tab. He is also going to talk to his pharmacist as he has another question for him. He states he was able to keep his lunch down, just feels a little nauseous. I told him that we should wait to see what the COVID results are too, but if he worsens to make an appt. He agreed with plan.

## 2022-02-15 NOTE — TELEPHONE ENCOUNTER
---------------------  From: Mauri/Kelly OWUSU (Phone Messages Pool ([id number]_[place] [facility] [place]))   To: Alexander Higginbotham MD;     Sent: 4/13/2021 1:48:16 PM CDT  Subject: General Message     Phone Message    PCP: JENNIFER    Time of Call: 2808    Phone Number: [phone]    Returned call at: n/a    Note: Patient LM stating that he is going on vacation next week and would like his hydrocodone filled- LF 2/22/21. Please advise on fill    Pharmacy: [facility]     Last office visit and reason: 2/22/21 Hospital f/u     Transferred to: JENNIFER---------------------  From: Alexander Higginbotham MD   To: Phone [employer] ([id number]_[place] [facility] [place]);     Sent: 4/13/2021 3:53:31 PM CDT  Subject: RE: General Message     [place]

## 2022-02-15 NOTE — CARE COORDINATION
Patient:   PARIS FARMER            MRN: [id number]            FIN: [id number]               Age:   75 years     Sex:  Male     :  1945   Associated Diagnoses:   None   Author:   Loyda Elizondo CMA      Care Coordinator ER Discharge Note      Sources of Information:  [ ] Patient, family member, or caregiver (Please list):  [ ] Hospital discharge summary  [ ] Hospital fax  [X ] List of recent hospitalizations or ED visits: No CC contact made pt was transferred to in pt psychiatry  [ ] Other:     Discharged From: [facility] ER  Discharge Date: 21    Diagnosis/Problem: anxiety/depression, suicidal ideation    Medication Changes: [ ] Yes [X ] No   Medication List Updated: [ ] Yes [ X] No    Needs Referral or Lab: [ ] Yes [ X] No    Needs Follow-up Appointment:  [X ] Within 7 days of discharge (highly complex visit)  [ ] Within 14 days of discharge (moderately complex visit)    Appointment Made With: no appt at this time  Date:    Additional Information Needed and Requested:  [ ] Yes:  [X ] No

## 2022-02-15 NOTE — PROGRESS NOTES
Patient:   PARIS FARMER            MRN: [id number]            FIN: [id number]               Age:   74 years     Sex:  Male     :  1945   Associated Diagnoses:   Nausea; Suspected COVID-19 virus infection   Author:   Farhan De Leon PA-C      Visit Information      Date of Service: 2020 09:05 am  Performing Location: [facility]  Encounter#: [id number]      Primary Care Provider (PCP):  Alexander Higginbotham MD    NPI# [id number]   Visit type:  Telephone Encounter.    Source of history:  Patient.    Location of patient:  _home  Call Start Time:   _1001  Call End Time:    _1010      Chief Complaint   2020 9:41 AM CDT    verbal consent for telephone visit to discuss lightheadedness and not able to keep food down... just started antidepressant but has since stopped it...would maybe like COVID test     _      History of Present Illness   Today's visit was conducted via telephone due to the COVID-19 pandemic. Patient's consent to telephone visit was obtained and documented.      Reason for visit:  _  Chief complaint and symptoms noted above and confirmed with patient   he started Lexapro last weekend (for 3 days) and has been feeling lightheaded, shaky, had some abdominal pain, feels like he   can't keep anything down, has had some episodes of emesis           Review of Systems   Constitutional:  Chills, Fatigue, body aches, No fever.    Ear/Nose/Mouth/Throat:  No nasal congestion, No sore throat.    Respiratory:  No shortness of breath, No cough.    Gastrointestinal:  Nausea, Vomiting, Abdominal pain.    Neurologic:  Headache.       Impression and Plan   Diagnosis     Nausea (DYK20-AX R11.0).     Suspected COVID-19 virus infection (HBI76-CS R68.89).     Summary:  he has nausea and abdominal pain, could be due to starting Lexapro but need to rule out Covid as well  he has stopped the Lexapro, will get Covid testing,  and will give him zofran to take prn nausea/vomiting  he is willing to re-try  taking the Lexapro, but advised to wait a week or 2 until all of these sxs have passed and then to   start with 1/2 tab daily for at least a week and then if he is tolerating the medication can increase it to 1 tab daily, Patient is referred for curbside COVID-19 testing and is instructed of the following:  Patient should remain isolated until results of test return and given that tests are not 100% accurate, would be safest to assume that they are contagious with COVID-19 until their symptoms have fully resolved. Isolation is recommended for at least 7 days from the onset of symptoms and for 3 days after resolution of fevers and productive cough. This means patient should not go to work or any public areas. In addition, it is recommended at home that they separate themselves from other people and from animals as much as possible, including using a separate bathroom. If they do need to be around others, a facemask is recommended. Frequent hand hygiene and cleaning of high touch surfaces is also recommended.   Symptoms can last for several weeks. For patients with COVID-19, they can sometimes start to improve and then get worse again. If symptoms worsen at any time, including significant shortness of breath, low oxygen levels, high fevers that cannot be controlled, or concerns for dehydration, they should seek medical care. If going to the ER, calling 911, or seeking care at the clinic, they are reminded to notify staff that they have been tested for COVID-19.  Patient also is informed that testing will be done in their car at a scheduled time. Test will be sent to an outside commercial lab and billed by that lab. [facility] cannot confirm to patient how billing will be handled by their insurance company.    Patient is also informed that testing for COVID-19 must be reported to the public health department along with contact information for the patient.   Patient information is given to scheduling staff to get  patient scheduled for testing. Patient will receive further instructions from scheduling staff.  Patient is encouraged to call back at any time with questions or concerns.  .    Orders     Orders   Pharmacy:  Zofran ODT 8 mg oral tablet, disintegrating (Prescribe): = 1 tab(s) ( 8 mg ), po, q8 hrs, PRN: for nausea/vomiting, # 12 tab(s), 0 Refill(s), Type: Maintenance, Pharmacy: [facility] #[id number], 1 tab(s) Oral q8 hrs,PRN:for nausea/vomiting, 70, in, 7/22/2020 9:41 AM CDT, Height Measured, 161.2, lb, 6/11/2020 9:14 AM CDT, Weight Measured.     Orders   Lab (Gen Lab  Reference Lab):  SARS-CoV-2 RNA (COVID-19), Qualitative NAAT* ([facility]) (Order): Specimen Type: Nasopharyngeal Swab, Collection Date: 7/22/2020 10:14 AM CDT.     Orders   Charges (Evaluation and Management):  [id number] physician telephone evaluation 5-10 min (Charge) (Order): Quantity: 1, Suspected COVID-19 virus infection  Nausea.        Health Status   Allergies:    Allergic Reactions (Selected)  Severity Not Documented  Amitriptyline (Dizziness and dry mouth)  Indocin (Memory problems and disorientation)  Paxil (Anxious and trembles)  Nonallergic Reactions (Selected)  Severity Not Documented  Beta blockers (Reactive airway disease)   Medications:  (Selected)   Prescriptions  Prescribed  Lexapro 10 mg oral tablet: = 1 tab(s) ( 10 mg ), Oral, daily, # 30 tab(s), 5 Refill(s), Type: Maintenance, Pharmacy: [facility] #[id number], 1 tab(s) Oral daily, 70, in, 07/17/20 16:01:00 CDT, Height Measured, 161.2, lb, 06/11/20 9:14:00 CDT, Weight Measured  Norco 5 mg-325 mg oral tablet: 1 tab(s), po, q4-6 hrs, Instructions: Up to 5 tabs per day. Fill 7/10/2020, # 150 EA, 0 Refill(s), Type: Maintenance, Pharmacy: [facility] #[id number], 1 tab(s) Oral q4-6 hrs,Instr:Up to 5 tabs per day. Fill 7/10/2020, 70, in, 06/11/20 9:14:00 CD...  Norco 5 mg-325 mg oral tablet: 1 tab(s), po, q4-6 hrs, Instructions: Up to 5 tabs per day., # 150 EA, 0 Refill(s), Type:  Maintenance, Pharmacy: [facility] #[id number], 1 tab(s) Oral q4-6 hrs,Instr:Up to 5 tabs per day., 70, in, 06/11/20 9:14:00 CDT, Height Measured, 161.2, lb,...  Replacment CPAP +9 cm H2o: Replacment CPAP +9 cm H2o, See Instructions, Instructions: Heated humidifier, heated tubing, filters, nasal or full face mask of choice with headgear.  Replacement cushions and supplies as needed.  Change supplies every 6 mos  Months = 99 (Lifetime),...  eletriptan 40 mg oral tablet: = 1 tab(s) ( 40 mg ), PO, Once, PRN: for migraine headache, # 12 tab(s), 5 Refill(s), Type: Soft Stop, Pharmacy: [facility] #[id number], 1 tab(s) Oral once,PRN:for migraine headache  Documented Medications  Documented  Nasacort: 0 Refill(s), Type: Maintenance  ferrous sulfate 325 mg (65 mg elemental iron) oral tablet: 1 tab(s) ( 325 mg ), po, daily, 0 Refill(s), Type: Maintenance   Problem list:    All Problems  Sleep apnea / SNOMED CT 920774097 / Confirmed  Chronic pain / SNOMED CT 557590676 / Confirmed  Rhinitis, chronic / SNOMED CT 336484611 / Confirmed  Glucose intolerance (impaired glucose tolerance) / SNOMED CT 88018727 / Confirmed  NSAID-induced gastric ulcer / SNOMED CT 1414741060 / Confirmed  IBS (irritable bowel syndrome) / SNOMED CT 01891171 / Confirmed  Varicose veins / SNOMED CT 574380621 / Confirmed  BPH associated with nocturia / SNOMED CT 1273679864 / Confirmed  H/O: iron deficiency anemia / SNOMED CT 964959144 / Confirmed  Periodic limb movement sleep disorder / SNOMED CT 3304525284 / Confirmed  Chronic headache / SNOMED CT 7709381356 / Confirmed  Acid reflux / SNOMED CT 255064009 / Confirmed  Use of opiates for therapeutic purposes / SNOMED CT 407072361 / Confirmed  Fibromyalgia / SNOMED CT 29253180 / Confirmed  Common migraine / SNOMED CT 19561716 / Confirmed      Histories   Past Medical History:    Active  Sleep apnea (634393157): Onset on 11/22/2004 at 59 years.  Comments:  9/12/2014 CDT 5:28 PM CDT - Anahy MARCUS,  Alexander  At [facility]11/22/2004: RDI 30.1 with oximetry james 63%. 4/1/2005 adequate CPAP titration to 8. Optimal CPAP titration to 9 at [facility] 10/30/2009.  Chronic headache (9702660546)  Acid reflux (384639526)  IBS (irritable bowel syndrome) (92549728)  Fibromyalgia (90167275)  Periodic limb movement sleep disorder (1260442678)  Common migraine (22409447)  NSAID-induced gastric ulcer (2921541516)  H/O: iron deficiency anemia (561717525)  Rhinitis, chronic (478581199)  Resolved  *Hospitalized@[facility] - Acute bronchitis: Onset on 3/7/2012 at 66 years.  Resolved.  *Hospitalized@[facility] - NSAID gastropathy: Onset on 7/25/2011 at 65 years.  Resolved.  Depression with anxiety (078273530):  Resolved.   Family History:    Colitis  Sister  CA - Cancer  Mother: onset at 60 .     Procedure history:    Capsule endoscopy (7759500859) on 12/30/2009 at 64 Years.  Comments:  11/23/2010 3:28 PM Alexander Lewis MD  negative  Colonoscopy (463521555) on 12/3/2009 at 64 Years.  Comments:  9/12/2014 5:22 PM Alexander Gtz MD  Normal    11/23/2010 3:28 PM Alexander Lewis MD  negative  Upper gastrointestinal endoscopy (250634359) on 7/17/2009 at 63 Years.  Comments:  11/23/2010 3:30 PM Alexander Lewis MD  gastritis  Esophagogastroduodenoscopy (129265887) on 5/28/2004 at 58 Years.  Comments:  9/12/2014 5:31 PM Alexander Gtz MD  Mild gastritis and incomplete Schatzki's ring  Colonoscopy (144848218) on 5/28/2004 at 58 Years.  Comments:  9/12/2014 5:29 PM Alexander Gtz MD  Normal  Cholecystectomy (12209038) in 2002 at 57 Years.  Spinal fusion (79722370) in 1995 at 50 Years.  Esophagogastroduodenoscopy (0957314927) in 1989 at 44 Years.  Endoscopy with biopsy in the month of 7/1989 at 43 Years.  Right knee arthroscopy.   Social History:        Alcohol Assessment            Current, 1 drinks/episode average.      Tobacco Assessment            Never      Substance Abuse Assessment: Denies Substance  Abuse      Employment and Education Assessment            Employed                     Comments:                      12/06/2012 - Anahy MARCUS, Alexander                     halfway      Home and Environment Assessment            Marital status:  (Living together).      Exercise and Physical Activity Assessment            Exercise frequency: Exercises but did not indicate how often..        Physical Examination   Measurements from flowsheet : Measurements   7/22/2020 9:41 AM CDT Height Measured - Standard 70 in    Height/Length Estimated 7 in

## 2022-02-15 NOTE — PROGRESS NOTES
Patient:   PARIS FARMER            MRN: [id number]            FIN: [id number]               Age:   73 years     Sex:  Male     :  1945   Associated Diagnoses:   Sleep apnea; Immunization due; Fibromyalgia; Chronic pain; Chronic headache   Author:   Alexander Higginbotham MD      Visit Information   Visit type:  Scheduled follow-up.    Accompanied by:  No one.    Source of history:  Self.    History limitation:  None.       Chief Complaint   10/16/2018 4:40 PM CDT   med refills        History of Present Illness   Pain controlled. 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 HA improved with CPAP use.    Has the patients condition changed significantly since their last visit?     no  Has the patient been compliant with treatments, including non-opioid treatments?    yes  Has there been any aberrant opioid behavior since the last visit?     no  Is the total opioid dose less than 90 morphine equivalents?    yes  Is there reasonable progress toward meeting established functional goals?     remains active  Was the last drug test consistent with prescribed medications?     yes  Is another  drug test due at this visit?     no  Has the ePDMP been reviewed?     yes  Is the patient complying with their signed Controlled Substance Abuse Agreement?     yes  Based on all the above, is the patient still a candidate for chronic opioid therapy?       yes      Review of Systems   Constitutional:  No fatigue, No decreased activity, No weight loss.    Ear/Nose/Mouth/Throat:  allergy symptoms.    Respiratory:  No shortness of breath, No cough.    Cardiovascular:  No chest pain, No palpitations, No peripheral edema.    Gastrointestinal:  No nausea, No vomiting, No diarrhea, No constipation.    Musculoskeletal:  Negative except as documented in history of present illness.    Neurologic:  Negative except as documented in history of present illness.       Health Status   Allergies:    Allergic Reactions (Selected)  Severity Not  Documented  Amitriptyline (Dizziness and dry mouth)  Indocin (Memory problems and disorientation)  Paxil (Anxious and trembles)  Nonallergic Reactions (Selected)  Severity Not Documented  Beta blockers (Reactive airway disease)   Medications:  (Selected)   Prescriptions  Prescribed  Norco 5 mg-325 mg oral tablet: 1 tab(s), po, q4-6 hrs, Instructions: Up to 5 daily. Fill on 10/20/2018, # 150 EA, 0 Refill(s), Type: Maintenance, Pharmacy: [facility] #[id number], 1 tab(s) Oral q4-6 hrs,Instr:Up to 5 daily. Fill on 10/20/2018  Norco 5 mg-325 mg oral tablet: 1 tab(s), po, q4-6 hrs, Instructions: Up to 5 day. Fill 11/19/2018, # 150 EA, 0 Refill(s), Type: Maintenance, Pharmacy: [facility] #[id number], 1 tab(s) Oral q4-6 hrs,Instr:Up to 5 day. Fill 11/19/2018  Relpax 40 mg oral tablet: See Instructions, Instructions: Take 1 tab by mouth once as needed for migraine headache may repeat dose once in 2 hrs, # 12 tab(s), 5 Refill(s), Type: Soft Stop, Pharmacy: [facility] #[id number], Take 1 tab by mouth once as needed for migraine headach...  Replacment CPAP +9 cm H2o: Replacment CPAP +9 cm H2o, See Instructions, Instructions: Heated humidifier, heated tubing, filters, nasal or full face mask of choice with headgear.  Replacement cushions and supplies as needed.  Change supplies every 6 mos  Months = 99 (Lifetime),...  Documented Medications  Documented  Nasacort: 0 Refill(s), Type: Maintenance  ferrous sulfate 325 mg (65 mg elemental iron) oral tablet: 1 tab(s) ( 325 mg ), po, daily, 0 Refill(s), Type: Maintenance   Problem list:    All Problems  Acid reflux / SNOMED CT 145778241 / Confirmed  BPH associated with nocturia / SNOMED CT 1170702150 / Confirmed  Chronic headache / SNOMED CT 6201244214 / Confirmed  Chronic pain / SNOMED CT 823622668 / Confirmed  Common migraine / SNOMED CT 78789768 / Confirmed  Fibromyalgia / SNOMED CT 47998963 / Confirmed  Glucose intolerance (impaired glucose tolerance) / [facility] CT 92570534 /  Confirmed  H/O: iron deficiency anemia / SNOMED CT 647070112 / Confirmed  IBS (irritable bowel syndrome) / SNOMED CT 23710416 / Confirmed  NSAID-induced gastric ulcer / SNOMED CT 3784047251 / Confirmed  Periodic limb movement sleep disorder / SNOMED CT 7769556963 / Confirmed  Rhinitis, chronic / SNOMED CT 189229593 / Confirmed  Sleep apnea / SNOMED CT 096124211 / Confirmed  Varicose veins / SNOMED CT 309271533 / Confirmed  Resolved: *Hospitalized@[facility] Acute bronchitis  Resolved: *Hospitalized@[facility] NSAID gastropathy  Resolved: Depression with anxiety / SNOMED CT 806815895  Canceled: Common Migraine / ICD-9-.10      Histories   Past Medical History:    Active  Sleep apnea (333860267): Onset on 11/22/2004 at 59 years.  Comments:  9/12/2014 CDT 5:28 PM CDT - Alexander Higginbotham MD  [facility]: RDI 30.1 with oximetry james 63%. 4/1/2005 adequate CPAP titration to 8. Optimal CPAP titration to 9 at [facility] 10/30/2009.  Chronic headache (2980889114)  Acid reflux (059126795)  IBS (irritable bowel syndrome) (32796876)  Fibromyalgia (23888592)  Periodic limb movement sleep disorder (8641357952)  Common migraine (13148541)  NSAID-induced gastric ulcer (6437254469)  H/O: iron deficiency anemia (605027180)  Rhinitis, chronic (978756179)  Resolved  *Hospitalized@[facility] Acute bronchitis: Onset on 3/7/2012 at 66 years.  Resolved.  *Hospitalized@[facility] NSAID gastropathy: Onset on 7/25/2011 at 65 years.  Resolved.  Depression with anxiety (361541864):  Resolved.   Family History:    Colitis  Sister  [place] - Cancer  Mother: onset at 60 .     Procedure history:    Capsule endoscopy (0679758569) on 12/30/2009 at 64 Years.  Comments:  11/23/2010 3:28 PM - Alexander Higginbotham MD  negative  Colonoscopy (946410965) on 12/3/2009 at 64 Years.  Comments:  9/12/2014 5:22 PM - Alexander Higginbotham MD  Normal    11/23/2010 3:28 PM - Alexander Higginbotham MD  negative  Upper gastrointestinal endoscopy (729254380) on 7/17/2009 at 63  Years.  Comments:  11/23/2010 3:30 PM - Alexander Higginbotham MD  gastritis  Esophagogastroduodenoscopy (258012899) on 5/28/2004 at 58 Years.  Comments:  9/12/2014 5:31 PM - Alexander Higginbotham MD  Mild gastritis and incomplete Schatzki's ring  Colonoscopy (306992860) on 5/28/2004 at 58 Years.  Comments:  9/12/2014 5:29 PM - Alexander Higginbotham MD  Normal  Cholecystectomy (27541728) in 2002 at 57 Years.  Spinal fusion (08276354) in 1995 at 50 Years.  Esophagogastroduodenoscopy (6339884005) in 1989 at 44 Years.  Endoscopy with biopsy in the month of 7/1989 at 43 Years.  Right knee arthroscopy.   Social History:        Alcohol Assessment            Current, 1 drinks/episode average.      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      Exercise and Physical Activity Assessment            Exercise frequency: Exercises but did not indicate how often..        Physical Examination   Vital Signs   10/16/2018 4:40 PM CDT Peripheral Pulse Rate 73 bpm    Systolic Blood Pressure 132 mmHg  HI    Diastolic Blood Pressure 76 mmHg    Mean Arterial Pressure 95 mmHg    BP Site Right arm      Measurements from flowsheet : Measurements   10/16/2018 4:40 PM CDT Height Measured - Standard 70 in    Weight Measured - Standard 155 lb    BSA 1.86 m2    Body Mass Index 22.24 kg/m2      General:  Alert and oriented, No acute distress.    Eye:  Normal conjunctiva.    HENT:  Normocephalic, Normal hearing.    Musculoskeletal:  Normal gait.    Integumentary:  No pallor.    Neurologic:  No focal deficits.    Cognition and Speech:  Speech clear and coherent, Functional cognition intact.    Psychiatric:  Cooperative, Appropriate mood & affect.        Impression and Plan   Diagnosis     Sleep apnea (JVQ59-MB G47.30).     Immunization due (YRD90-UQ Z23).     Fibromyalgia (TXM34-CF M79.7).     Chronic pain (OTO82-MX G89.29).     Chronic headache (BQX96-KD R51).     Course:  Good response to treatment.    Patient Instructions:       Counseled: Patient, Diet, Activity, Verbalized understanding, Reviewed the importance of CPAP adherence to reduce daytime sleepiness and prevent excess mortality associated with sleep apnea .    Orders     Orders (Selected)   Outpatient Orders  Ordered  Return to Clinic (Request): RFV: Wellness, Return in June 2018, Instructions: lalb before visit  Return to Clinic (Request): Return in 2 months  Return to Office (Request): RFV: Annual cbc, chem 14, psa, tsh, ferritin. lipids, Return in Annually in June Completed  Flublok Quadrivalent [id number]: 0.5 mL, IM, once  Pneumovax 23: 0.5 mL, im, once  Prescriptions  Prescribed  Norco 5 mg-325 mg oral tablet: 1 tab(s), po, q4-6 hrs, Instructions: Up to 5 daily. Fill on 10/20/2018, # 150 EA, 0 Refill(s), Type: Maintenance, Pharmacy: [facility] #[id number], 1 tab(s) Oral q4-6 hrs,Instr:Up to 5 daily. Fill on 10/20/2018  Norco 5 mg-325 mg oral tablet: 1 tab(s), po, q4-6 hrs, Instructions: Up to 5 day. Fill 11/19/2018, # 150 EA, 0 Refill(s), Type: Maintenance, Pharmacy: [facility] #[id number], 1 tab(s) Oral q4-6 hrs,Instr:Up to 5 day. Fill 11/19/2018  Relpax 40 mg oral tablet: See Instructions, Instructions: Take 1 tab by mouth once as needed for migraine headache may repeat dose once in 2 hrs, # 12 tab(s), 5 Refill(s), Type: Soft Stop, Pharmacy: [facility] #[id number], Take 1 tab by mouth once as needed for migraine headach...  Replacment CPAP +9 cm H2o: Replacment CPAP +9 cm H2o, See Instructions, Instructions: Heated humidifier, heated tubing, filters, nasal or full face mask of choice with headgear.  Replacement cushions and supplies as needed.  Change supplies every 6 mos  Months = 99  (Lifetime),...  Documented Medications  Documented  Nasacort: 0 Refill(s), Type: Maintenance  ferrous sulfate 325 mg (65 mg elemental iron) oral tablet: 1 tab(s) ( 325 mg ), po, daily, 0 Refill(s), Type: Maintenance.

## 2022-02-15 NOTE — TELEPHONE ENCOUNTER
---------------------  From: Alexander Higginbotham MD   To: Sleep Message Pool ([id number] - [place]);     Sent: 2/22/2021 7:40:46 AM CST  Subject: General Message     CPAP compliance report in one month please.Reminder set.

## 2022-02-15 NOTE — NURSING NOTE
Comprehensive Intake Entered On:  12/12/2019 4:35 PM CST    Performed On:  12/12/2019 4:29 PM CST by Kelly Pittman               Summary   Chief Complaint :   Patient is here today for Rx refill and c/o cold x 1 month, coughs at night a lot    Weight Measured :   157.0 lb(Converted to: 157 lb 0 oz, 71.21 kg)    Height Measured :   70 in(Converted to: 5 ft 10 in, 177.80 cm)    Body Mass Index :   22.52 kg/m2   Body Surface Area :   1.87 m2   Systolic Blood Pressure :   112 mmHg   Diastolic Blood Pressure :   62 mmHg   Mean Arterial Pressure :   79 mmHg   Peripheral Pulse Rate :   82 bpm   BP Site :   Right arm   BP Method :   Manual   HR Method :   Electronic   Temperature Tympanic :   97.9 DegF(Converted to: 36.6 DegC)    Race :      Languages :   English   Ethnicity :   Not  or    Kelly Pittman - 12/12/2019 4:29 PM CST   Health Status   Allergies Verified? :   Yes   Medication History Verified? :   Yes   Medical History Verified? :   Yes   Pre-Visit Planning Status :   Completed   Tobacco Use? :   Never smoker   Kelly Pittman - 12/12/2019 4:29 PM CST   Consents   Consent for Immunization Exchange :   Consent Granted   Consent for Immunizations to Providers :   Consent Granted   Kelly Pittman - 12/12/2019 4:29 PM CST   Meds / Allergies   (As Of: 12/12/2019 4:35:22 PM CST)   Allergies (Active)   amitriptyline  Estimated Onset Date:   Unspecified ; Reactions:   Dizziness, Dry mouth ; Created By:   Aliya Marcano; Reaction Status:   Active ; Category:   Drug ; Substance:   amitriptyline ; Type:   Allergy ; Updated By:   Aliya Marcano; Source:   Paper Chart ; Reviewed Date:   12/12/2019 4:32 PM CST      beta blockers  Estimated Onset Date:   Unspecified ; Reactions:   Reactive airway disease ; Created By:   Alexander Higginbotham MD; Reaction Status:   Active ; Category:   Drug ; Substance:   beta blockers ; Type:   Side Effect ; Updated By:   Alexander Higginbotham MD;  Reviewed Date:   12/12/2019 4:32 PM CST      Indocin  Estimated Onset Date:   <not entered> 4/1/1992 ; Reactions:   memory problems, Disorientation ; Created By:   Aliya Marcano; Reaction Status:   Active ; Category:   Drug ; Substance:   Indocin ; Type:   Allergy ; Updated By:   Aliya Marcano; Source:   Paper Chart ; Reviewed Date:   12/12/2019 4:32 PM CST      Paxil  Estimated Onset Date:   Unspecified ; Reactions:   Anxious, Trembles ; Created By:   Aliya Marcano; Reaction Status:   Active ; Category:   Drug ; Substance:   Paxil ; Type:   Allergy ; Updated By:   Aliya Marcano; Source:   Paper Chart ; Reviewed Date:   12/12/2019 4:32 PM CST        Medication List   (As Of: 12/12/2019 4:35:22 PM CST)   Prescription/Discharge Order    acetaminophen-hydrocodone  :   acetaminophen-hydrocodone ; Status:   Prescribed ; Ordered As Mnemonic:   Norco 5 mg-325 mg oral tablet ; Simple Display Line:   1 tab(s), po, q4-6 hrs, Up to 5 tabs per day.  Fill 11/14/19, 150 EA, 0 Refill(s) ; Ordering Provider:   Alexadner Higginbotham MD; Catalog Code:   acetaminophen-hydrocodone ; Order Dt/Tm:   10/10/2019 4:53:23 PM CDT          acetaminophen-hydrocodone  :   acetaminophen-hydrocodone ; Status:   Prescribed ; Ordered As Mnemonic:   Norco 5 mg-325 mg oral tablet ; Simple Display Line:   1 tab(s), po, q4-6 hrs, Up to 5 tabs per day. Fill 10/15/2019, 150 EA, 0 Refill(s) ; Ordering Provider:   Alexander Higginbotham MD; Catalog Code:   acetaminophen-hydrocodone ; Order Dt/Tm:   10/10/2019 4:53:17 PM CDT          eletriptan  :   eletriptan ; Status:   Prescribed ; Ordered As Mnemonic:   eletriptan 40 mg oral tablet ; Simple Display Line:   40 mg, 1 tab(s), PO, Once, PRN: for migraine headache, 12 tab(s), 5 Refill(s) ; Ordering Provider:   Alexander Higginbotham MD; Catalog Code:   eletriptan ; Order Dt/Tm:   8/15/2019 4:15:35 PM CDT          Miscellaneous Rx Supply  :   Miscellaneous Rx Supply ; Status:   Prescribed ;  Ordered As Mnemonic:   Replacment CPAP +9 cm H2o ; Simple Display Line:   See Instructions, Heated humidifier, heated tubing, filters, nasal or full face mask of choice with headgear.  Replacement cushions and supplies as needed.  Change supplies every 6 mos  Months = 99 (Lifetime), 1 EA, 11 Refill(s) ; Ordering Provider:   Alexander Higginbotham MD; Catalog Code:   Miscellaneous Rx Supply ; Order Dt/Tm:   4/13/2017 3:34:47 PM CDT            Home Meds    ferrous sulfate  :   ferrous sulfate ; Status:   Documented ; Ordered As Mnemonic:   ferrous sulfate 325 mg (65 mg elemental iron) oral tablet ; Simple Display Line:   325 mg, 1 tab(s), po, daily, 0 Refill(s) ; Catalog Code:   ferrous sulfate ; Order Dt/Tm:   1/25/2018 4:43:44 PM CST          triamcinolone nasal  :   triamcinolone nasal ; Status:   Documented ; Ordered As Mnemonic:   Nasacort ; Catalog Code:   triamcinolone nasal ; Order Dt/Tm:   6/17/2014 3:37:34 PM CDT

## 2022-02-15 NOTE — NURSING NOTE
Depression Screening Entered On:  7/21/2020 9:54 AM CDT    Performed On:  7/17/2020 9:54 AM CDT by Yuli Bishop               Depression Screening   Little Interest - Pleasure in Activities :   Nearly every day   Feeling Down, Depressed, Hopeless :   Nearly every day   Initial Depression Screen Score :   6 Score   Poor Appetite or Overeating :   More than half the days   Trouble Falling or Staying Asleep :   Several days   Feeling Tired or Little Energy :   Nearly every day   Feeling Bad About Yourself :   Several days   Trouble Concentrating :   Several days   Moving or Speaking Slowly :   Several days   Thoughts Better Off Dead or Hurting Self :   Several days   FEROZ Difficulty with Work, Home, Others :   Very difficult   Detailed Depression Screen Score :   10    Total Depression Screen Score :   16    Yuli Bishop - 7/21/2020 9:54 AM CDT

## 2022-02-15 NOTE — PROGRESS NOTES
Patient in need of CPAP set up.  Order, physician notes, sleep study and insurance card [facility] [phone].  Confirmation rec'd.  Appt Scheduled:  xx Yes     _ No

## 2022-02-15 NOTE — NURSING NOTE
Comprehensive Intake Entered On:  3/19/2019 4:41 PM CDT    Performed On:  3/19/2019 4:39 PM CDT by Fiona Guerra MA               Summary   Chief Complaint :   med refills, has had bronchitis over past week   Weight Measured :   156 lb(Converted to: 156 lb 0 oz, 70.76 kg)    Height Measured :   70 in(Converted to: 5 ft 10 in, 177.80 cm)    Body Mass Index :   22.38 kg/m2   Body Surface Area :   1.87 m2   Systolic Blood Pressure :   116 mmHg   Diastolic Blood Pressure :   73 mmHg   Mean Arterial Pressure :   87 mmHg   Peripheral Pulse Rate :   77 bpm   BP Site :   Right arm   Race :      Languages :   English   Ethnicity :   Not  or    Fiona Guerra MA - 3/19/2019 4:39 PM CDT   Health Status   Allergies Verified? :   Yes   Medication History Verified? :   Yes   Tobacco Use? :   Never smoker   Fiona Guerra MA - 3/19/2019 4:39 PM CDT   Meds / Allergies   (As Of: 3/19/2019 4:41:17 PM CDT)   Allergies (Active)   amitriptyline  Estimated Onset Date:   Unspecified ; Reactions:   Dizziness, Dry mouth ; Created By:   Aliya Marcano; Reaction Status:   Active ; Category:   Drug ; Substance:   amitriptyline ; Type:   Allergy ; Updated By:   Aliya Marcano; Source:   Paper Chart ; Reviewed Date:   1/25/2018 4:27 PM CST      beta blockers  Estimated Onset Date:   Unspecified ; Reactions:   Reactive airway disease ; Created By:   Alexander Higginbotham MD; Reaction Status:   Active ; Category:   Drug ; Substance:   beta blockers ; Type:   Side Effect ; Updated By:   Alexander Higginbotham MD; Reviewed Date:   1/25/2018 4:27 PM CST      Indocin  Estimated Onset Date:   <not entered> 4/1/1992 ; Reactions:   memory problems, Disorientation ; Created By:   Aliya Marcano; Reaction Status:   Active ; Category:   Drug ; Substance:   Indocin ; Type:   Allergy ; Updated By:   Aliya Marcano; Source:   Paper Chart ; Reviewed Date:   1/25/2018 4:27 PM CST      Paxil  Estimated Onset Date:    Unspecified ; Reactions:   Anxious, Trembles ; Created By:   Aliya Marcano; Reaction Status:   Active ; Category:   Drug ; Substance:   Paxil ; Type:   Allergy ; Updated By:   Aliya Marcano; Source:   Paper Chart ; Reviewed Date:   1/25/2018 4:27 PM CST        Medication List   (As Of: 3/19/2019 4:41:17 PM CDT)   Prescription/Discharge Order    acetaminophen-hydrocodone  :   acetaminophen-hydrocodone ; Status:   Prescribed ; Ordered As Mnemonic:   Norco 5 mg-325 mg oral tablet ; Simple Display Line:   1 tab(s), po, q4-6 hrs, Up to 5 tabs per day. Fill 4/18/2019, 150 EA, 0 Refill(s) ; Ordering Provider:   Alexander Higginbotham MD; Catalog Code:   acetaminophen-hydrocodone ; Order Dt/Tm:   3/19/2019 4:35:07 PM          acetaminophen-hydrocodone  :   acetaminophen-hydrocodone ; Status:   Prescribed ; Ordered As Mnemonic:   Norco 5 mg-325 mg oral tablet ; Simple Display Line:   1 tab(s), po, q4-6 hrs, Up to 5 tabs per day., 150 EA, 0 Refill(s) ; Ordering Provider:   Alexander Higginbotham MD; Catalog Code:   acetaminophen-hydrocodone ; Order Dt/Tm:   3/19/2019 4:35:04 PM          eletriptan  :   eletriptan ; Status:   Prescribed ; Ordered As Mnemonic:   Relpax 40 mg oral tablet ; Simple Display Line:   See Instructions, Take 1 tab by mouth once as needed for migraine headache may repeat dose once in 2 hrs, 12 tab(s), 5 Refill(s) ; Ordering Provider:   Alexander Higginbotham MD; Catalog Code:   eletriptan ; Order Dt/Tm:   10/16/2018 4:56:45 PM          Miscellaneous Rx Supply  :   Miscellaneous Rx Supply ; Status:   Prescribed ; Ordered As Mnemonic:   Replacment CPAP +9 cm H2o ; Simple Display Line:   See Instructions, Heated humidifier, heated tubing, filters, nasal or full face mask of choice with headgear.  Replacement cushions and supplies as needed.  Change supplies every 6 mos  Months = 99 (Lifetime), 1 EA, 11 Refill(s) ; Ordering Provider:   Alexander Higginbotham MD; Catalog Code:   Miscellaneous Rx Supply ; Order Dt/Tm:    4/13/2017 3:34:47 PM            Home Meds    ferrous sulfate  :   ferrous sulfate ; Status:   Documented ; Ordered As Mnemonic:   ferrous sulfate 325 mg (65 mg elemental iron) oral tablet ; Simple Display Line:   325 mg, 1 tab(s), po, daily, 0 Refill(s) ; Catalog Code:   ferrous sulfate ; Order Dt/Tm:   1/25/2018 4:43:44 PM          triamcinolone nasal  :   triamcinolone nasal ; Status:   Documented ; Ordered As Mnemonic:   Nasacort ; Catalog Code:   triamcinolone nasal ; Order Dt/Tm:   6/17/2014 3:37:34 PM

## 2022-02-15 NOTE — TELEPHONE ENCOUNTER
---------------------  From: Alexander Higginbotham MD   To: Sleep Message Pool ([id number] - [place]);     Sent: 2/6/2020 4:46:22 PM CST  Subject: General Message     Device downloadSpoke with patient. He stated that he has not been using his machine every night for sometime. He plans on starting to use it every night from now on so he can get more data on his chip before having a download done.    He plans on brining his chip in to his next visit.

## 2022-02-15 NOTE — LETTER
(Inserted Image. Unable to display)   June 07, 2021    PARIS FARMER  [address] [place] [address]            Dear PARIS,      Thank you for selecting [facility] for your healthcare needs.    Our records indicate you are due for the following services:     Fasting Lab Tests ~ Please do not eat or drink anything 10 hours prior to your scheduled appointment time.  (Water and any medications that you may need are allowed unless directed otherwise.)    If you had your labs done at another facility or with Direct Access Lab Testing at [facility], please bring in a copy of the results to your next visit, mail a copy, or drop off a copy of your results to your Healthcare Provider.    (FYI   Regarding office visits: In some instances, a video visit or telephone visit may be offered as an option.)      To schedule an appointment or if you have further questions, please contact your clinic at [phone].      Powered by [employer] and [employer]    Sincerely,    Alexander Higginbotham MD, FACP

## 2022-02-15 NOTE — PROGRESS NOTES
Patient:   PARIS FARMER            MRN: [id number]            FIN: [id number]               Age:   72 years     Sex:  Male     :  1945   Associated Diagnoses:   Fibromyalgia; Common Migraine; Sleep apnea; Periodic Limb Movement Sleep Disorder   Author:   Alexander Higginbotham MD      Visit Information   Visit type:  Scheduled follow-up.    Accompanied by:  No one.    Source of history:  Self.    History limitation:  None.       Chief Complaint   2018 4:38 PM CDT    med refills      History of Present Illness    The patient notes that he is having less frequent migraines.  He needs his hydrocodone and Relpax refilled.  He continues to get good relief of his fibromyalgia pain with hydrocodone which allows him to keep his busy lifestyle.  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     Has the patients condition changed significantly since their last visit?     no  Has the patient been compliant with treatments, including non-opioid treatments?    yes  Has there been any aberrant opioid behavior since the last visit?     no  Is the total opioid dose less than 90 morphine equivalents?    yes  Is there reasonable progress toward meeting established functional goals?     remains active  Was the last drug test consistent with prescribed medications?     yes  Is another  drug test due at this visit?     no  Has the ePDMP been reviewed?     yes  Is the patient complying with their signed Controlled Substance Abuse Agreement?     yes  Based on all the above, is the patient still a candidate for chronic opioid therapy?       yes         Review of Systems   Constitutional:  No fatigue, No decreased activity, No weight loss.    Ear/Nose/Mouth/Throat:  allergy symptoms.    Respiratory:  No shortness of breath, No cough.    Cardiovascular:  No chest pain, No palpitations, No peripheral edema.    Gastrointestinal:  No nausea, No vomiting, No diarrhea, No  constipation.    Musculoskeletal:  Negative except as documented in history of present illness.    Neurologic:  Negative except as documented in history of present illness.       Health Status   Allergies:    Allergic Reactions (Selected)  Severity Not Documented  Amitriptyline (Dizziness and dry mouth)  Indocin (Memory problems and disorientation)  Paxil (Anxious and trembles)  Nonallergic Reactions (Selected)  Severity Not Documented  Beta blockers (Reactive airway disease)   Medications:  (Selected)   Prescriptions  Prescribed  Norco 5 mg-325 mg oral tablet: 1 tab(s), po, q4-6 hrs, Instructions: Up to 5 daily. Fill 6/23/18, # 150 EA, 0 Refill(s), Type: Maintenance, Pharmacy: [id number], 1 tab(s) po q4-6 hrs,Instr:Up to 5 daily. Fill 6/23/18  Norco 5 mg-325 mg oral tablet: 1 tab(s), po, q4-6 hrs, Instructions: Up to 5 dayl., # 150 EA, 0 Refill(s), Type: Maintenance, Pharmacy: [id number], 1 tab(s) po q4-6 hrs,Instr:Up to 5 dayl.  Relpax 40 mg oral tablet: See Instructions, Instructions: Take 1 tab by mouth once as needed for migraine headache may repeat dose once in 2 hrs, # 12 tab(s), 5 Refill(s), Type: Soft Stop, Pharmacy: [id number], Take 1 tab by mouth once as needed for migraine headach...  Replacment CPAP +9 cm H2o: Replacment CPAP +9 cm H2o, See Instructions, Instructions: Heated humidifier, heated tubing, filters, nasal or full face mask of choice with headgear.  Replacement cushions and supplies as needed.  Change supplies every 6 mos  Months = 99 (Lifetime),...  Documented Medications  Documented  Nasacort: 0 Refill(s), Type: Maintenance  ferrous sulfate 325 mg (65 mg elemental iron) oral tablet: 1 tab(s) ( 325 mg ), po, daily, 0 Refill(s), Type: Maintenance   Problem list:    All Problems  Acid reflux / SNOMED CT 876982564 / Confirmed  Benign prostatic hypertrophy (BPH) with nocturia / SNOMED CT 8954398051 / Confirmed  Chronic headache / SNOMED CT 6071972068 /  Confirmed  Common migraine / SNOMED CT 05377662 / Confirmed  Fibromyalgia / SNOMED CT 50419852 / Confirmed  Glucose intolerance (impaired glucose tolerance) / SNOMED CT 56059221 / Confirmed  H/O: iron deficiency anemia / SNOMED CT 342515292 / Confirmed  IBS (irritable bowel syndrome) / SNOMED CT 94039784 / Confirmed  NSAID-induced gastric ulcer / SNOMED CT 7295237658 / Confirmed  Periodic limb movement sleep disorder / SNOMED CT 0844999065 / Confirmed  Rhinitis, chronic / SNOMED CT 018432894 / Confirmed  Sleep apnea / SNOMED CT 212166666 / Confirmed  Varicose veins / SNOMED CT 020656527 / Confirmed  Resolved: *Hospitalized@[facility] - Acute bronchitis  Resolved: *Hospitalized@[facility] - NSAID gastropathy  Resolved: Depression with anxiety / SNOMED CT 582929162  Canceled: Common Migraine / ICD-9-.10      Histories   Past Medical History:    Active  Sleep apnea (463543612): Onset on 11/22/2004 at 59 years.  Comments:  9/12/2014 CDT 5:28 PM CDT - Alexander Higginbotham MD  [facility] RDI 30.1 with oximetry james 63%. 4/1/2005 adequate CPAP titration to 8. Optimal CPAP titration to 9 at [facility] 10/30/2009.  Chronic headache (6224967394)  Acid reflux (702044707)  IBS (irritable bowel syndrome) (44500661)  Fibromyalgia (67950017)  Periodic limb movement sleep disorder (2075251520)  Common migraine (91870231)  NSAID-induced gastric ulcer (2911263030)  H/O: iron deficiency anemia (906479671)  Rhinitis, chronic (224596211)  Resolved  *Hospitalized@[facility] - Acute bronchitis: Onset on 3/7/2012 at 66 years.  Resolved.  *Hospitalized@[facility] - NSAID gastropathy: Onset on 7/25/2011 at 65 years.  Resolved.  Depression with anxiety (100026179):  Resolved.   Family History:    Colitis  Sister  CA - Cancer  Mother     Procedure history:    Capsule endoscopy (4904307285) on 12/30/2009 at 64 Years.  Comments:  11/23/2010 3:28 PM - Alexander Higginbotham MD  negative  Colonoscopy (366450183) on 12/3/2009 at 64  Years.  Comments:  9/12/2014 5:22 PM - Alexander Higginbotham MD  Normal    11/23/2010 3:28 PM - Alexander Higginbotham MD  negative  Upper gastrointestinal endoscopy (954634003) on 7/17/2009 at 63 Years.  Comments:  11/23/2010 3:30 PM - Alexander Higginbotham MD  gastritis  Esophagogastroduodenoscopy (611835160) on 5/28/2004 at 58 Years.  Comments:  9/12/2014 5:31 PM - Alexander Higginbotham MD  Mild gastritis and incomplete Schatzki's ring  Colonoscopy (592822902) on 5/28/2004 at 58 Years.  Comments:  9/12/2014 5:29 PM - Alexander Higginbotham MD  Normal  Cholecystectomy (29658651) in 2002 at 57 Years.  Spinal fusion (42133943) in 1995 at 50 Years.  Esophagogastroduodenoscopy (1863026522) in 1989 at 44 Years.  Endoscopy with biopsy in the month of 7/1989 at 43 Years.  Right knee arthroscopy.   Social History:        Alcohol Assessment            Current, 1 drinks/episode average.      Tobacco Assessment            Never      Employment and Education Assessment            Employed                     Comments:                      12/06/2012 - Alexander Higginbotham MD                     senior care      Home and Environment Assessment            Marital status:  (Living together).        Physical Examination   Vital Signs   5/24/2018 4:38 PM CDT Peripheral Pulse Rate 69 bpm    Systolic Blood Pressure 118 mmHg    Diastolic Blood Pressure 69 mmHg    Mean Arterial Pressure 85 mmHg    BP Site Right arm      Measurements from flowsheet : Measurements   5/24/2018 4:38 PM CDT Height Measured - Standard 70 in    Weight Measured - Standard 161 lb    BSA 1.9 m2    Body Mass Index 23.1 kg/m2      General:  Alert and oriented, No acute distress.    Eye:  Normal conjunctiva.    HENT:  Normocephalic, Normal hearing.    Musculoskeletal:  Normal gait.    Integumentary:  No pallor.    Cognition and Speech:  Speech clear and coherent, Functional cognition intact.    Psychiatric:  Cooperative, Appropriate mood & affect.       Impression and Plan   Diagnosis      Fibromyalgia (FYO79-IH M79.7).     Common Migraine (WVQ37-FR G43.009).     Course:  Good response to treatment.    Patient Instructions:       Counseled: Patient, Diet, Activity, Verbalized understanding.    Orders     Orders (Selected)   Outpatient Orders  Ordered  Return to Clinic (Request): RFV: Wellness, Return in June 2018, Instructions: lalb before visit  Return to Clinic (Request): Return in 2 months  Return to Office (Request): RFV: Annual cbc, chem 14, psa, tsh, ferritin. lipids, Return in Annually in June  Prescriptions  Prescribed  Norco 5 mg-325 mg oral tablet: 1 tab(s), po, q4-6 hrs, Instructions: Up to 5 daily. Fill 6/23/18, # 150 EA, 0 Refill(s), Type: Maintenance, Pharmacy: [id number], 1 tab(s) po q4-6 hrs,Instr:Up to 5 daily. Fill 6/23/18  Norco 5 mg-325 mg oral tablet: 1 tab(s), po, q4-6 hrs, Instructions: Up to 5 dayl., # 150 EA, 0 Refill(s), Type: Maintenance, Pharmacy: [id number], 1 tab(s) po q4-6 hrs,Instr:Up to 5 dayl.  Relpax 40 mg oral tablet: See Instructions, Instructions: Take 1 tab by mouth once as needed for migraine headache may repeat dose once in 2 hrs, # 12 tab(s), 5 Refill(s), Type: Soft Stop, Pharmacy: [id number], Take 1 tab by mouth once as needed for migraine headach....     Diagnosis     Sleep apnea (SIP80-DM G47.30).     Periodic Limb Movement Sleep Disorder (YCG69-WL G47.61).     Patient Instructions:       Counseled: Patient, Verbalized understanding, Reviewed the importance of CPAP adherence to reduce daytime sleepiness and prevent excess mortality associated with sleep apnea .    Orders     Device download.

## 2022-02-15 NOTE — TELEPHONE ENCOUNTER
---------------------  From: Nelia Armenta CMA (Phone Messages Pool ([id number] - [place]))   To: Alexander Higginbotham MD;     Sent: 2/12/2019 8:32:30 AM CST  Subject: Phone Note: refill     Phone Message    PCP:   JENNIFER       Time of Call:  8:17 am       Person Calling:  pt  Phone number:  [phone] ok message    Returned call at: n/a    Note:   Pt calls requesting a refill of his Norco. Is aware he is due for an appt for refills, but is unable to scheduled with [facility] this week. Pt will be out of meds this week. Please advise.     Last filled: 12/14/18 #150, 1 refill  RTC: February 2019  CSA: 6/8/17    [facility]: [facility]    Last office visit and reason:  12/14/18; pain---------------------  From: Alexander Higginbotham MD   To: Phone Messages Pool ([id number] - [place]);     Sent: 2/12/2019 1:29:04 PM CST  Subject: RE: Phone Note: refill     doneMsg left with wife that rx has been filled to pharmacy.

## 2022-02-15 NOTE — PROGRESS NOTES
Chief Complaint    Pateint is here today c/o redness around mouth x 1 month,  cold x 4 days  History of Present Illness      Patient complains of over a month of irritation and rash about the mouth.  He cannot control the saliva flow at the corners.  He gets worse after he shaves.  He is been using triple antibiotic ointment and other preparations to no avail.      Also has a head cold the last couple of days with nasal congestion rhinitis and minimal cough.  No fever, chills, myalgia, headache.  Review of Systems      Pain is well controlled.  He tells me he is using his CPAP regularly.  No chest pain, dyspnea, edema, abdominal pain.  Physical Exam   Vitals & Measurements    T: 97.0   F (Tympanic)  HR: 76(Peripheral)  BP: 122/60  SpO2: 98%     HT: 70 in  WT: 155.0 lb  BMI: 22.24       Patient appears comfortable and in no acute distress.  Alert and oriented.  HEENT exam is significant for about a centimeter of erythema about the mouth.  Lips are not involved.  Except for some cracking at the corner which she is constantly dabbing at.  H EENT exam otherwise unremarkable.  Neck supple no adenopathy or thyromegaly.  Chest clear to auscultation and percussion.  Assessment/Plan       Acute URI (J06.9)         Progressing as expected.  Reviewed symptomatic measures.  Return if not better.         Ordered:          62104 office outpatient visit 15 minutes (Charge), Quantity: 1, Perleche  Acute URI                Perleche (K13.0)         I am suspicious of a bacterial likely staphylococcal superinfection.  Bactroban ointment 4 times a day.  Return if not improved.         Ordered:          mupirocin topical, 1 steven, Topical, qid, x 10 day(s), # 30 gm, 0 Refill(s), Type: Acute, Pharmacy: [facility] #[id number], 1 steven Topical qid,x10 day(s), (Ordered)          27519 office outpatient visit 15 minutes (Charge), Quantity: 1, Perleche  Acute URI           Patient Information     Name:PARIS FARMER      Address:      [address]   [place], [place] [id number]     Sex:Male     YOB: 1945     [phone]     Emergency Contact:MARIBELL FARMER     MRN:[id number]     FIN:[id number]     Location:[facility]     Date of Service:11/04/2019      Primary Care Physician:       Alexander Higginbotham MD, [phone]      Attending Physician:       Alexander Higginbotham MD, [phone]  Problem List/Past Medical History    Ongoing     Acid reflux     BPH associated with nocturia     Chronic headache     Chronic pain     Common migraine     Fibromyalgia     Glucose intolerance (impaired glucose tolerance)     H/O: iron deficiency anemia     IBS (irritable bowel syndrome)     NSAID-induced gastric ulcer     Periodic limb movement sleep disorder     Rhinitis, chronic     Sleep apnea       Comments: At [facility]/22/2004: RDI 30.1 with oximetry james 63%. 4/1/2005 adequate CPAP titration to 8. Optimal CPAP titration to 9 at [facility] 10/30/2009.     Use of opiates for therapeutic purposes     Varicose veins    Historical     *Hospitalized@[facility] - Acute bronchitis     *Hospitalized@[facility] - NSAID gastropathy     Depression with anxiety  Procedure/Surgical History     Capsule endoscopy (12/30/2009)      Comments: negative.     Colonoscopy (12/03/2009)      Comments: Normal. negative.     Upper gastrointestinal endoscopy (07/17/2009)      Comments: gastritis.     Colonoscopy (05/28/2004)      Comments: Normal.     Esophagogastroduodenoscopy (05/28/2004)      Comments: Mild gastritis and incomplete Schatzki's ring.     Cholecystectomy (2002)     Spinal fusion (1995)     Endoscopy with biopsy (07.1989)     Esophagogastroduodenoscopy (1989)     Right knee arthroscopy  Medications    eletriptan 40 mg oral tablet, 40 mg= 1 tab(s), Oral, once, PRN, 5 refills    ferrous sulfate 325 mg (65 mg elemental iron) oral tablet, 325 mg= 1 tab(s), Oral, daily    mupirocin 2% topical ointment, 1 steven, Topical, qid    Nasacort    Simpson 5  mg-325 mg oral tablet, 1 tab(s), Oral, q4-6 hrs    Norco 5 mg-325 mg oral tablet, 1 tab(s), Oral, q4-6 hrs    Replacment CPAP +9 cm H2o, See Instructions, 11 refills  Allergies    Indocin (Disorientation, memory problems)    Paxil (Trembles, Anxious)    amitriptyline (Dizziness, Dry mouth)    beta blockers (Reactive airway disease)  Social History    Smoking Status - 11/04/2019     Never smoker     Alcohol      Current, 1 drinks/episode average., 03/26/2018     Employment/School      Employed, 12/06/2012     Exercise      Exercise frequency: Exercises but did not indicate how often.., 06/08/2018     Home/Environment      Marital status:  (Living together)., 12/06/2012     Substance Abuse - Denies Substance Abuse, 06/08/2018     Tobacco      Never, 11/21/2013  Family History    CA - Cancer: Mother (Dx at 60).    Colitis: Sister.    Brother: History is negative    Sister: History is negative    Sister: History is negative  Immunizations      Vaccine Date Status Comments      influenza virus vaccine, inactivated 10/10/2019 Given      pneumococcal (PPSV23) 10/16/2018 Given      influenza virus vaccine, inactivated 10/16/2018 Given      influenza virus vaccine, inactivated 09/30/2017 Given      influenza virus vaccine, inactivated 08/30/2016 Given      ZOS, shingles 04/10/2016 Recorded [4/11/2016] [facility]      pneumococcal (PCV13) 09/06/2015 Recorded      influenza virus vaccine, inactivated 09/06/2015 Recorded [9/8/2015] per [facility]      influenza virus vaccine, inactivated 10/12/2013 Given      pneumococcal (PPSV23) 10/04/2012 Given      influenza virus vaccine, inactivated 10/04/2012 Given      influenza virus vaccine, inactivated 10/01/2011 Recorded      influenza virus vaccine, inactivated 09/21/2011 Given      influenza, H1N1, inactivated 01/26/2010 Recorded      influenza virus vaccine, inactivated 10/17/2009 Recorded      pneumococcal (PPSV23) 10/01/1996 Recorded  Lab Results          Lab Results (Last 4  results within 90 days)           Sodium Level: 139 mmol/L [135 mmol/L - 146 mmol/L] (08/23/19 08:03:00)          Potassium Level: 4.9 mmol/L [3.5 mmol/L - 5.3 mmol/L] (08/23/19 08:03:00)          Chloride Level: 102 mmol/L [98 mmol/L - 110 mmol/L] (08/23/19 08:03:00)          CO2 Level: 31 mmol/L [20 mmol/L - 32 mmol/L] (08/23/19 08:03:00)          Glucose Level: 103 mg/dL High [65 mg/dL - 99 mg/dL] (08/23/19 08:03:00)          BUN: 13 mg/dL [7 mg/dL - 25 mg/dL] (08/23/19 08:03:00)          Creatinine Level: 0.86 mg/dL [0.7 mg/dL - 1.18 mg/dL] (08/23/19 08:03:00)          BUN/Creat Ratio: NOT APPLICABLE [6  - 22] (08/23/19 08:03:00)          eGFR: 87 mL/min/1.73m2 (08/23/19 08:03:00)          eGFR African American: 100 mL/min/1.73m2 (08/23/19 08:03:00)          Calcium Level: 9.6 mg/dL [8.6 mg/dL - 10.3 mg/dL] (08/23/19 08:03:00)          Bilirubin Total: 0.9 mg/dL [0.2 mg/dL - 1.2 mg/dL] (08/23/19 08:03:00)          Alkaline Phosphatase: 83 unit/L [40 unit/L - 115 unit/L] (08/23/19 08:03:00)          AST/SGOT: 22 unit/L [10 unit/L - 35 unit/L] (08/23/19 08:03:00)          ALT/SGPT: 14 unit/L [9 unit/L - 46 unit/L] (08/23/19 08:03:00)          Protein Total: 6.9 gm/dL [6.1 gm/dL - 8.1 gm/dL] (08/23/19 08:03:00)          Albumin Level: 4 gm/dL [3.6 gm/dL - 5.1 gm/dL] (08/23/19 08:03:00)          Globulin: 2.9 [1.9  - 3.7] (08/23/19 08:03:00)          A/G Ratio: 1.4 [1  - 2.5] (08/23/19 08:03:00)          Cholesterol: 172 mg/dL (08/23/19 08:03:00)          Non-HDL Cholesterol: 109 (08/23/19 08:03:00)          HDL: 63 mg/dL (08/23/19 08:03:00)          Cholesterol/HDL Ratio: 2.7 (08/23/19 08:03:00)          LDL: 93 (08/23/19 08:03:00)          Triglyceride: 70 mg/dL (08/23/19 08:03:00)          TSH: 2.14 mIU/L [0.4 mIU/L - 4.5 mIU/L] (08/23/19 08:03:00)          Ferritin: 21 ng/mL Low [24 ng/mL - 380 ng/mL] (08/23/19 08:03:00)          PSA: 0.4 ng/mL (08/23/19 08:03:00)          WBC: 4.8 [3.8  - 10.8] (08/23/19  08:03:00)          RBC: 5.03 [4.2  - 5.8] (08/23/19 08:03:00)          Hgb: 13.6 gm/dL [13.2 gm/dL - 17.1 gm/dL] (08/23/19 08:03:00)          Hct: 42.1 % [38.5 % - 50 %] (08/23/19 08:03:00)          MCV: 83.7 fL [80 fL - 100 fL] (08/23/19 08:03:00)          MCH: 27 pg [27 pg - 33 pg] (08/23/19 08:03:00)          MCHC: 32.3 gm/dL [32 gm/dL - 36 gm/dL] (08/23/19 08:03:00)          RDW: 13.6 % [11 % - 15 %] (08/23/19 08:03:00)          Platelet: 285 [140  - 400] (08/23/19 08:03:00)          MPV: 9.5 fL [7.5 fL - 12.5 fL] (08/23/19 08:03:00)

## 2022-02-15 NOTE — PROGRESS NOTES
Chief Complaint  med refills  History of Present Illness  Fer continues to get good relief of his fibromyalgia pain with up to 5 Norco per day. 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 ?He has not tolerated Cymbalta or Lyrica. ?He is physically active.  He has a new CPAP device. ?He has been increasing his usage. ?He knows that when he uses it regularly his migraine frequency goes down dramatically.  Has the patients condition changed significantly since their last visit?  ? ? ?No  Has the patient been compliant with treatments, including non-opioid treatments?  ? ? ?Yes  Has there been any aberrant opioid behavior since the last visit?  ? ? ?No  Is the total opioid dose less than 90 morphine equivalents?  ? ? ?Yes  Is there reasonable progress toward meeting established functional goals?  ? ? ?Yes  Was the last drug test consistent with prescribed medications?  ? ? ?None  Is another drug test due at this visit?  ? ? ?We will get one today.  Has the ePDMP been reviewed?  ? ? ?Yes  Is the patient complying with their signed Controlled Substance Abuse Agreement?  ? ? Yes  Based on all the above, is the patient still a candidate for chronic opioid therapy?  ? ? _Yes  Review of Systems  No fevers, chills, weight loss, cough, headache, myalgia, chest pain, abdominal pain, diarrhea, rectal bleeding, swollen joints.  Problem List/Past Medical History  Ongoing  Acid Reflux  Benign prostatic hypertrophy (BPH) with nocturia  Chronic Headache  Common Migraine  Fibromyalgia  H/O: Iron Deficiency Anemia  IBS (Irritable Bowel Syndrome)  NSAID-Induced Gastric Ulcer  Periodic Limb Movement Sleep Disorder  Rhinitis, chronic  Sleep apnea  Varicose veins  Resolved  *Hospitalized@[facility] - Acute bronchitis  *Hospitalized@[facility] - NSAID gastropathy  Depression with anxiety  Procedure/Surgical History  Capsule endoscopy (12/30/2009),  Colonoscopy (12/03/2009),  Upper gastrointestinal endoscopy  (07/17/2009),  Colonoscopy (05/28/2004),  Esophagogastroduodenoscopy (05/28/2004),  Cholecystectomy (2002),  Spinal fusion (1995),  Endoscopy with biopsy (07/1989),  Esophagogastroduodenoscopy (1989),  Right knee arthroscopy.  Home Medications  acetaminophen-hydrocodone 325 mg-5 mg oral tablet, 1 tab(s), po, q4-6 hrs, PRN  Nasacort  Norco 5 mg-325 mg oral tablet, 1 tab(s), po, q4-6 hrs  Relpax 40 mg oral tablet, 40 mg, 1 tab(s), po, once, PRN, 5 refills  Replacment CPAP +9 cm H2o, 11 refills  Slow Fe 160 mg oral tablet, extended release, 160 mg, 1 tab(s), po, bid  Allergies  Indocin?(Disorientation, memory problems)  Paxil?(Anxious, Trembles)  amitriptyline?(Dizziness, Dry mouth)  beta blockers?(Reactive airway disease)  Social History  Employment and Education  Employed  Home and Environment  Marital status:  (Living together).  Family History  CA - Cancer: Mother.  Colitis: Sister.  Immunizations  Physical Exam  Vitals & Measurements  HR:?87?(Peripheral)?  BP:?135/86?  HT:?70?in?  WT:?159?lb?  BMI:?22.81?  Patient appears comfortable. ?Is a fit appearing man in no distress. ?Alert and oriented. ?Chest clear. ?Cardiac exam regular. ?Abdomen is nontender. ?Extremities have no edema. ?Neurologic exam is nonfocal. ?No hot joints.  Lab Results  Diagnostic Results  Assessment/Plan  Acid Reflux  Asymptomatic on medication.  Common Migraine  Unchanged. ?Reviewed healthy lifestyle need uses CPAP.  Fibromyalgia  Well-controlled with his current Norco 5 tablets daily. ?Refilled for 2 months at which time follow-up. ?Drug test today.  Ordered:  Pain Management Profile 1 with Confirmation, Urine* ([facility])  H/O: Iron Deficiency Anemia  Unchanged.  Sleep apnea  Patient is encouraged to use his CPAP every night all night.  Orders:  acetaminophen-hydrocodone, 1 tab(s), po, q4-6 hrs, # 150 EA, 0 Refill(s), Type: Maintenance, Pharmacy: [facility] #[id number], Fill on 7/28/17, 1 tab(s) po q4-6 hrs  acetaminophen-hydrocodone, 1  tab(s), PO, q4-6 hrs, PRN: as needed for pain, # 150 EA, 0 Refill(s), Type: Maintenance, Pharmacy: [facility] #[id number], Fill 6/28/17, 1 tab(s) po q4-6 hrs,PRN:as needed for pain  Return to Clinic (Request)

## 2022-02-15 NOTE — LETTER
(Inserted Image. Unable to display)     February 18, 2019      PARIS FARMER  [address], [place] [id number]          Dear PARIS,      Thank you for selecting [facility] ([facility]viously [facility]) for your healthcare needs.      Our records indicate you are due for the following services:     Follow-up office visit / Medication Check.      To schedule an appointment or if you have further questions, please contact your primary clinic:   [facility]       [phone]   [facility]       [phone]              [facility]     [phone]      Powered by [employer] and [employer]    Sincerely,    Alexander Higginbotham MD, FACP

## 2022-02-15 NOTE — NURSING NOTE
Pt was seen for COVID curbside testing per [facility].  O2 sat= 97%.  Specimen sent to [facility] as Priority #2.  Forms faxed to [facility].

## 2022-02-15 NOTE — NURSING NOTE
CAGE Assessment Entered On:  6/1/2021 5:55 PM CDT    Performed On:  6/1/2021 5:55 PM CDT by Kelly Pittman               Assessment   Have you ever felt you should cut down on your drinking :   No   Have people annoyed you by criticizing your drinking :   Yes   Have you ever felt bad or guilty about your drinking :   No   Have you ever taken a drink first thing in the morning to steady your nerves or get rid of a hangover (Eye-opener) :   No   CAGE Score :   1    Kelly Pittman - 6/1/2021 5:55 PM CDT

## 2022-02-15 NOTE — PROGRESS NOTES
Patient:   PARIS FARMER            MRN: [id number]            FIN: [id number]               Age:   72 years     Sex:  Male     :  1945   Associated Diagnoses:   Acid reflux; BPH associated with nocturia; Chronic headache; Chronic pain; Common migraine; Fibromyalgia; Glucose intolerance (impaired glucose tolerance); H/O: iron deficiency anemia; Medicare annual wellness visit, subsequent; NSAID-induced gastric ulcer; Rhinitis, chronic; Sleep apnea   Author:   Alexander Higginbotham MD      Visit Information   Visit type:  Annual exam.    Accompanied by:  No one.    Source of history:  Self.    History limitation:  None.       Chief Complaint   2018 9:55 AM CDT     AWV      History of Present Illness    The patient is a 72-year-old here for a wellness exam.  He is generally quite happy with his health.  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  He is having less frequent migraines and finds that the more he uses his CPAP the less he has of them.  He has acid reflux chronically and takes over-the-counter Prilosec once or twice per week for symptoms.  No dysphagia or odynophagia.  He has benign prostatic hypertrophy and is up at night once or twice with some daytime urgency.  He has chronic headaches and chronic myalgia due to his fibromyalgia.  With the hydrocodone he is able to keep up his usual activities.  He gets good relief of migraine with triptan when he does have it.  Patient has worked on keeping his weight down due to glucose intolerance.  He uses his CPAP.    On the Health Risk Assessment form there are no positives.    GDS (short form) score is 2.     No additional positives on complete review of systems.     Has the patients condition changed significantly since their last visit?     no  Has the patient been compliant with treatments, including non-opioid treatments?    yes  Has there been any aberrant opioid behavior since the last visit?     no  Is the total opioid dose less than 90  morphine equivalents?    yes  Is there reasonable progress toward meeting established functional goals?     remains active  Was the last drug test consistent with prescribed medications?     yes  Is another  drug test due at this visit?     no  Has the ePDMP been reviewed?     yes  Is the patient complying with their signed Controlled Substance Abuse Agreement?     yes  Based on all the above, is the patient still a candidate for chronic opioid therapy?       yes         Review of Systems   See HPI       Health Status   Allergies:    Allergic Reactions (Selected)  Severity Not Documented  Amitriptyline (Dizziness and dry mouth)  Indocin (Memory problems and disorientation)  Paxil (Anxious and trembles)  Nonallergic Reactions (Selected)  Severity Not Documented  Beta blockers (Reactive airway disease)   Medications:  (Selected)   Prescriptions  Prescribed  Norco 5 mg-325 mg oral tablet: 1 tab(s), po, q4-6 hrs, Instructions: Up to 5 daily. Fill 6/23/18, # 150 EA, 0 Refill(s), Type: Maintenance, Pharmacy: [facility] #[id number], 1 tab(s) po q4-6 hrs,Instr:Up to 5 daily. Fill 6/23/18  Norco 5 mg-325 mg oral tablet: 1 tab(s), po, q4-6 hrs, Instructions: Up to 5 day. Fill 7/23/18, # 150 EA, 0 Refill(s), Type: Maintenance, Pharmacy: [facility] #[id number], 1 tab(s) po q4-6 hrs,Instr:Up to 5 day. Fill 7/23/18  Relpax 40 mg oral tablet: See Instructions, Instructions: Take 1 tab by mouth once as needed for migraine headache may repeat dose once in 2 hrs, # 12 tab(s), 5 Refill(s), Type: Soft Stop, Pharmacy: [facility] #[id number], Take 1 tab by mouth once as needed for migraine headach...  Replacment CPAP +9 cm H2o: Replacment CPAP +9 cm H2o, See Instructions, Instructions: Heated humidifier, heated tubing, filters, nasal or full face mask of choice with headgear.  Replacement cushions and supplies as needed.  Change supplies every 6 mos  Months = 99 (Lifetime),...  Documented Medications  Documented  Nasacort: 0 Refill(s),  Type: Maintenance  ferrous sulfate 325 mg (65 mg elemental iron) oral tablet: 1 tab(s) ( 325 mg ), po, daily, 0 Refill(s), Type: Maintenance   Problem list:    All Problems  Acid reflux / SNOMED CT 472447418 / Confirmed  Benign prostatic hypertrophy (BPH) with nocturia / SNOMED CT 1021009299 / Confirmed  Chronic headache / SNOMED CT 9433539573 / Confirmed  Chronic pain / SNOMED CT 433656024 / Confirmed  Common migraine / SNOMED CT 46481423 / Confirmed  Fibromyalgia / SNOMED CT 21106694 / Confirmed  Glucose intolerance (impaired glucose tolerance) / SNOMED CT 98572639 / Confirmed  H/O: iron deficiency anemia / SNOMED CT 807489095 / Confirmed  IBS (irritable bowel syndrome) / SNOMED CT 01499876 / Confirmed  NSAID-induced gastric ulcer / SNOMED CT 8147152949 / Confirmed  Periodic limb movement sleep disorder / SNOMED CT 3981397794 / Confirmed  Rhinitis, chronic / SNOMED CT 491202690 / Confirmed  Sleep apnea / SNOMED CT 936281934 / Confirmed  Varicose veins / SNOMED CT 729489960 / Confirmed  Resolved: *Hospitalized@[facility] Acute bronchitis  Resolved: *Hospitalized@[facility] NSAID gastropathy  Resolved: Depression with anxiety / SNOMED CT 669318724  Canceled: Common Migraine / ICD-9-.10      Histories   Past Medical History:    Active  Sleep apnea (939313964): Onset on 11/22/2004 at 59 years.  Comments:  9/12/2014 CDT 5:28 PM CDT - Alexander Higginbotham MD  [facility]: RDI 30.1 with oximetry james 63%. 4/1/2005 adequate CPAP titration to 8. Optimal CPAP titration to 9 at [facility] 10/30/2009.  Chronic headache (2638955715)  Acid reflux (908997664)  IBS (irritable bowel syndrome) (63044373)  Fibromyalgia (79677638)  Periodic limb movement sleep disorder (8652162198)  Common migraine (06602467)  NSAID-induced gastric ulcer (5969515922)  H/O: iron deficiency anemia (098506398)  Rhinitis, chronic (860058179)  Resolved  *Hospitalized@[facility] Acute bronchitis: Onset on 3/7/2012 at 66 years.   Resolved.  *Hospitalized@[facility] - NSAID gastropathy: Onset on 7/25/2011 at 65 years.  Resolved.  Depression with anxiety (370215028):  Resolved.   Family History:    Colitis  Sister  [place] - Cancer  Mother     Procedure history:    Capsule endoscopy (0623717138) on 12/30/2009 at 64 Years.  Comments:  11/23/2010 3:28 PM - Alexander Higginbotham MD  negative  Colonoscopy (450676665) on 12/3/2009 at 64 Years.  Comments:  9/12/2014 5:22 PM - Alexander Higginbotham MD  Normal    11/23/2010 3:28 PM - Alexander Higginbotham MD  negative  Upper gastrointestinal endoscopy (136825413) on 7/17/2009 at 63 Years.  Comments:  11/23/2010 3:30 PM Alexander Osborne MD  gastritis  Esophagogastroduodenoscopy (579684999) on 5/28/2004 at 58 Years.  Comments:  9/12/2014 5:31 PM - Alexander Higginbotham MD  Mild gastritis and incomplete Schatzki's ring  Colonoscopy (236868929) on 5/28/2004 at 58 Years.  Comments:  9/12/2014 5:29 PM Alexander Osborne MD  Normal  Cholecystectomy (86074723) in 2002 at 57 Years.  Spinal fusion (21683710) in 1995 at 50 Years.  Esophagogastroduodenoscopy (4768346430) in 1989 at 44 Years.  Endoscopy with biopsy in the month of 7/1989 at 43 Years.  Right knee arthroscopy.   Social History:        Alcohol Assessment            Current, 1 drinks/episode average.      Tobacco Assessment            Never      Employment and Education Assessment            Employed                     Comments:                      12/06/2012 - Alexander Higginbotham MD                     [employer]      Home and Environment Assessment            Marital status:  (Living together).        Physical Examination   Vital Signs   6/8/2018 9:55 AM CDT Peripheral Pulse Rate 82 bpm    Systolic Blood Pressure 125 mmHg    Diastolic Blood Pressure 78 mmHg    Mean Arterial Pressure 94 mmHg    BP Site Right arm      Measurements from flowsheet : Measurements   6/8/2018 9:55 AM CDT Height Measured - Standard 70 in    Weight Measured - Standard 159 lb    BSA 1.89 m2    Body Mass  Index 22.81 kg/m2      General:  Alert and oriented, No acute distress.    Eye:  Extraocular movements are intact, Normal conjunctiva.    Airway:       Mallampati classification: I (soft palate, fauces, uvula, pillars visible).    HENT:  Normocephalic, Normal hearing, Oral mucosa is moist, No pharyngeal erythema.    Neck:  Supple, Non-tender, No carotid bruit, No lymphadenopathy, No thyromegaly.    Respiratory:  Lungs are clear to auscultation, Respirations are non-labored.    Cardiovascular:  Normal rate, Regular rhythm, No murmur, No gallop, Normal peripheral perfusion, No edema.    Gastrointestinal:  Soft, Non-tender, No organomegaly.    Genitourinary:       Prostate: Symmetric, Enlarged  1 /4, Not nodular.    Lymphatics:  No lymphadenopathy neck, axilla, groin.    Musculoskeletal:  Normal gait.    Integumentary:  No pallor, No rash.    Feet:  Normal by visual exam, Normal pulses, Sensation intact, By monofilament exam, By vibration.    Neurologic:  Normal motor function, No focal deficits, Cranial Nerves II-XII are grossly intact.    Cognition and Speech:  Speech clear and coherent, Functional cognition intact.    Psychiatric:  Cooperative, Appropriate mood & affect.       Review / Management   Results review:  Lab results   6/2/2018 9:03 AM CDT Sodium Level 138 mmol/L    Potassium Level 4.5 mmol/L    Chloride Level 102 mmol/L    CO2 Level 30 mmol/L    Glucose Level 102 mg/dL  HI    BUN 15 mg/dL    Creatinine Level 0.76 mg/dL    BUN/Creat Ratio NOT APPLICABLE    eGFR 92 mL/min/1.73m2    eGFR African American 107 mL/min/1.73m2    Calcium Level 9.4 mg/dL    Bilirubin Total 0.8 mg/dL    Alkaline Phosphatase 129 unit/L  HI    AST/SGOT 24 unit/L    ALT/SGPT 19 unit/L    Protein Total 7.5 gm/dL    Albumin Level 4.0 gm/dL    Globulin 3.5    A/G Ratio 1.1    Cholesterol 159 mg/dL    Non-HDL Cholesterol 112    HDL 47 mg/dL    Cholesterol/HDL Ratio 3.4    LDL 91    Triglyceride 112 mg/dL    TSH 1.50 mIU/L    Ferritin 25  ng/mL    PSA 0.4 ng/mL    WBC 4.3    RBC 5.09    Hgb 13.6 gm/dL    Hct 42.6 %    MCV 83.7 fL    MCH 26.7 pg  LOW    MCHC 31.9 gm/dL  LOW    RDW 13.2 %    Platelet 280    MPV 9.5 fL   .       Impression and Plan   Diagnosis     Acid reflux (UFD75-YB K21.9).     BPH associated with nocturia (XYU18-HW N40.1).     Chronic headache (SOJ46-QS R51).     Chronic pain (UPH59-TD G89.29).     Common migraine (VHD64-KR G43.009).     Fibromyalgia (LNT09-PP M79.7).     Glucose intolerance (impaired glucose tolerance) (TYJ18-WC R73.02).     H/O: iron deficiency anemia (QYL59-JL Z86.2).     Medicare annual wellness visit, subsequent (SNU28-WW Z00.00).     NSAID-induced gastric ulcer (NZA43-GW T39.395A).     Rhinitis, chronic (XIW72-BF J31.0).     Sleep apnea (QIF02-BK G47.30).     Course:  Progressing as expected.    Patient Instructions:       Counseled: Patient, Diet, Activity, Verbalized understanding, Reviewed the importance of CPAP adherence to reduce daytime sleepiness and prevent excess mortality associated with sleep apnea .    Orders     Orders (Selected)   Outpatient Orders  Order  Return to Clinic (Request): RFV: Wellness, Return in June 2018, Instructions: lalb before visit  Return to Clinic (Request): Return in 2 months  Return to Office (Request): RFV: Annual cbc, chem 14, psa, tsh, ferritin. lipids, Return in Annually in June  Prescriptions  Prescribed  Norco 5 mg-325 mg oral tablet: 1 tab(s), po, q4-6 hrs, Instructions: Up to 5 daily. Fill 6/23/18, # 150 EA, 0 Refill(s), Type: Maintenance, Pharmacy: [facility] #[id number], 1 tab(s) po q4-6 hrs,Instr:Up to 5 daily. Fill 6/23/18  Norco 5 mg-325 mg oral tablet: 1 tab(s), po, q4-6 hrs, Instructions: Up to 5 day. Fill 7/23/18, # 150 EA, 0 Refill(s), Type: Maintenance, Pharmacy: [facility] #[id number], 1 tab(s) po q4-6 hrs,Instr:Up to 5 day. Fill 7/23/18  Relpax 40 mg oral tablet: See Instructions, Instructions: Take 1 tab by mouth once as needed for migraine headache may  repeat dose once in 2 hrs, # 12 tab(s), 5 Refill(s), Type: Soft Stop, Pharmacy: [facility] #[id number], Take 1 tab by mouth once as needed for migraine headach...  Replacment CPAP +9 cm H2o: Replacment CPAP +9 cm H2o, See Instructions, Instructions: Heated humidifier, heated tubing, filters, nasal or full face mask of choice with headgear.  Replacement cushions and supplies as needed.  Change supplies every 6 mos  Months = 99 (Lifetime),...  Documented Medications  Documented  Nasacort: 0 Refill(s), Type: Maintenance  ferrous sulfate 325 mg (65 mg elemental iron) oral tablet: 1 tab(s) ( 325 mg ), po, daily, 0 Refill(s), Type: Maintenance.     Device download.

## 2022-02-15 NOTE — TELEPHONE ENCOUNTER
---------------------  From: Nelia Armenta CMA (eRx Pool ([id number]_[facility]))   To: J[facility] Message Pool ([id number] [facility] [place]);     Sent: 5/4/2021 6:27:33 AM CDT  Subject: FW: Medication Management   Due Date/Time: 5/4/2021 5:16:00 PM CDT       Last visit 2/22/21 hospital f/u  Last filled 12/4/20 #12, 5 refills    ------------------------------------------  From: [facility] #[id number]  To: Alexander Higginbotham MD  Sent: May 3, 2021 5:16:43 PM CDT  Subject: Medication Management  Due: April 27, 2021 12:41:43 AM CDT     ** On Hold Pending Signature **     Dispensed Drug: eletriptan (eletriptan 40 mg oral tablet), TAKE 1 TABLET BY MOUTH 1 TIME AS NEEDED FOR MIGRAINE HEADACHE  Quantity: 12 tab(s)  Days Supply: 12  Refills: 0  Substitutions Allowed  Notes from Pharmacy:  ---------------------------------------------------------------  From: Kelly Pittman ([facility] Message Pool ([id number] [facility] [place]))   To: Alexander Higginbotham MD;     Sent: 5/4/2021 9:12:00 AM CDT  Subject: FW: Medication Management   Due Date/Time: 5/4/2021 5:16:00 PM CDT---------------------  From: Alexander Higginbotham MD   To: [facility] #[id number]    Sent: 5/4/2021 9:27:44 AM CDT  Subject: FW: Medication Management     ** Submitted: **  Complete:eletriptan (eletriptan 40 mg oral tablet)   Signed by Alexander Higginbotham MD  5/4/2021 2:27:00 PM [facility]    ** Approved with modifications: **  eletriptan (ELETRIPTAN 40MG TABLETS)  TAKE 1 TABLET BY MOUTH 1 TIME AS NEEDED FOR MIGRAINE HEADACHE  Qty:  12 tab(s)        Days Supply:  12        Refills:  4          Substitutions Allowed     Route To Pharmacy - [facility] #[id number]

## 2022-02-15 NOTE — NURSING NOTE
Quick Intake Entered On:  6/11/2020 9:14 AM CDT    Performed On:  6/11/2020 9:14 AM CDT by Quyen Matamoros RN               Summary   Chief Complaint :   declined recheck BP   Weight Measured :   161.2 lb(Converted to: 161 lb 3 oz, 73.12 kg)    Height Measured :   70 in(Converted to: 5 ft 10 in, 177.80 cm)    Body Mass Index :   23.13 kg/m2   Body Surface Area :   1.9 m2   Systolic Blood Pressure :   145 mmHg (HI)    Diastolic Blood Pressure :   68 mmHg   Mean Arterial Pressure :   94 mmHg   Peripheral Pulse Rate :   76 bpm   Race :      Languages :   English   Ethnicity :   Not  or    Quyen Matamoros RN - 6/11/2020 9:14 AM CDT

## 2022-02-15 NOTE — PROGRESS NOTES
Patient:   PARIS FARMER            MRN: [id number]            FIN: [id number]               Age:   74 years     Sex:  Male     :  1945   Associated Diagnoses:   Sleep apnea; Fibromyalgia; Chronic pain; Chronic headache   Author:   Alexander Higginbotham MD      Visit Information   Visit type:  Scheduled follow-up.    Accompanied by:  No one.    Source of history:  Self.    History limitation:  None.       Chief Complaint   10/10/2019 4:34 PM CDT   Med check      History of Present Illness    The patient is back at work as an elementary .  He is feeling well.  His pain is well-controlled.  He has had less frequent migraine and is more compliant with his CPAP, he tells me.     Has the patients condition changed significantly since their last visit?     no  Has the patient been compliant with treatments, including non-opioid treatments?    yes  Has there been any aberrant opioid behavior since the last visit?     no  Is the total opioid dose less than 90 morphine equivalents?    yes  Is there reasonable progress toward meeting established functional goals?     remains active  Was the last drug test consistent with prescribed medications?     yes  Is another  drug test due at this visit?    no  Has the ePDMP been reviewed?     yes  Is the patient complying with their signed Controlled Substance Abuse Agreement?     yes  Based on all the above, is the patient still a candidate for chronic opioid therapy?       yes         Review of Systems   Constitutional:  No fatigue, No decreased activity, No weight loss.    Ear/Nose/Mouth/Throat:  allergy symptoms.    Respiratory:  No shortness of breath, No cough.    Cardiovascular:  No chest pain, No palpitations, No peripheral edema.    Gastrointestinal:  No nausea, No vomiting, No diarrhea, No constipation.    Musculoskeletal:  Negative except as documented in history of present illness.    Neurologic:  Negative except as documented in history of present illness.        Health Status   Allergies:    Allergic Reactions (Selected)  Severity Not Documented  Amitriptyline (Dizziness and dry mouth)  Indocin (Memory problems and disorientation)  Paxil (Anxious and trembles)  Nonallergic Reactions (Selected)  Severity Not Documented  Beta blockers (Reactive airway disease)   Medications:  (Selected)   Prescriptions  Prescribed  Norco 5 mg-325 mg oral tablet: 1 tab(s), po, q4-6 hrs, Instructions: Up to 5 tabs per day.  Fill 11/14/19, # 150 EA, 0 Refill(s), Type: Maintenance, Pharmacy: [facility] #[id number], 1 tab(s) Oral q4-6 hrs,Instr:Up to 5 tabs per day. Fill 11/14/19  Norco 5 mg-325 mg oral tablet: 1 tab(s), po, q4-6 hrs, Instructions: Up to 5 tabs per day. Fill 10/15/2019, # 150 EA, 0 Refill(s), Type: Maintenance, Pharmacy: [facility] #[id number], 1 tab(s) Oral q4-6 hrs,Instr:Up to 5 tabs per day. Fill 10/15/2019  Replacment CPAP +9 cm H2o: Replacment CPAP +9 cm H2o, See Instructions, Instructions: Heated humidifier, heated tubing, filters, nasal or full face mask of choice with headgear.  Replacement cushions and supplies as needed.  Change supplies every 6 mos  Months = 99 (Lifetime),...  eletriptan 40 mg oral tablet: = 1 tab(s) ( 40 mg ), PO, Once, PRN: for migraine headache, # 12 tab(s), 5 Refill(s), Type: Soft Stop, Pharmacy: [facility] #[id number], 1 tab(s) Oral once,PRN:for migraine headache  Documented Medications  Documented  Nasacort: 0 Refill(s), Type: Maintenance  ferrous sulfate 325 mg (65 mg elemental iron) oral tablet: 1 tab(s) ( 325 mg ), po, daily, 0 Refill(s), Type: Maintenance,    Medications          *denotes recorded medication          Replacment CPAP +9 cm H2o: See Instructions, Heated humidifier, heated tubing, filters, nasal or full face mask of choice with headgear.  Replacement cushions and supplies as needed.  Change supplies every 6 mos  Months = 99 (Lifetime), 1 EA, 11 Refill(s).          Norco 5 mg-325 mg oral tablet: 1 tab(s), po, q4-6  hrs, Up to 5 tabs per day. Fill 10/15/2019, 150 EA, 0 Refill(s).          Norco 5 mg-325 mg oral tablet: 1 tab(s), po, q4-6 hrs, Up to 5 tabs per day.  Fill 11/14/19, 150 EA, 0 Refill(s).          eletriptan 40 mg oral tablet: 40 mg, 1 tab(s), PO, Once, PRN: for migraine headache, 12 tab(s), 5 Refill(s).          *ferrous sulfate 325 mg (65 mg elemental iron) oral tablet: 325 mg, 1 tab(s), po, daily, 0 Refill(s).          *Nasacort: 0 Refill(s), Type: Maintenance.       Problem list:    All Problems  Acid reflux / SNOMED CT 218204087 / Confirmed  BPH associated with nocturia / SNOMED CT 0890050460 / Confirmed  Chronic headache / SNOMED CT 7170750976 / Confirmed  Chronic pain / SNOMED CT 435241422 / Confirmed  Common migraine / SNOMED CT 34239828 / Confirmed  Fibromyalgia / SNOMED CT 10039845 / Confirmed  Glucose intolerance (impaired glucose tolerance) / SNOMED CT 68873410 / Confirmed  H/O: iron deficiency anemia / SNOMED CT 991088991 / Confirmed  IBS (irritable bowel syndrome) / SNOMED CT 30494093 / Confirmed  NSAID-induced gastric ulcer / SNOMED CT 1295600757 / Confirmed  Periodic limb movement sleep disorder / SNOMED CT 5581901264 / Confirmed  Rhinitis, chronic / SNOMED CT 207621976 / Confirmed  Sleep apnea / SNOMED CT 693439453 / Confirmed  Use of opiates for therapeutic purposes / SNOMED CT 976889093 / Confirmed  Varicose veins / SNOMED CT 780433593 / Confirmed  Resolved: *Hospitalized@[facility] - Acute bronchitis  Resolved: *Hospitalized@[facility] - NSAID gastropathy  Resolved: Depression with anxiety / SNOMED CT 814875213  Canceled: Common Migraine / ICD-9-.10      Histories   Past Medical History:    Active  Sleep apnea (097495644): Onset on 11/22/2004 at 59 years.  Comments:  9/12/2014 CDT 5:28 PM CDT - Alexander Higginbotham MD [facility]11/22/2004: RDI 30.1 with oximetry james 63%. 4/1/2005 adequate CPAP titration to 8. Optimal CPAP titration to 9 at [facility] 10/30/2009.  Chronic headache  (9767202570)  Acid reflux (757391924)  IBS (irritable bowel syndrome) (63419577)  Fibromyalgia (97865817)  Periodic limb movement sleep disorder (9522114228)  Common migraine (00104823)  NSAID-induced gastric ulcer (2194308264)  H/O: iron deficiency anemia (438116175)  Rhinitis, chronic (376461386)  Resolved  *Hospitalized@[facility] - Acute bronchitis: Onset on 3/7/2012 at 66 years.  Resolved.  *Hospitalized@[facility] - NSAID gastropathy: Onset on 7/25/2011 at 65 years.  Resolved.  Depression with anxiety (010692260):  Resolved.   Family History:    Colitis  Sister  CA - Cancer  Mother: onset at 60 .     Procedure history:    Capsule endoscopy (1518447212) on 12/30/2009 at 64 Years.  Comments:  11/23/2010 3:28 PM Alexander Lewis MD  negative  Colonoscopy (706944775) on 12/3/2009 at 64 Years.  Comments:  9/12/2014 5:22 PM Alexander Gtz MD  Normal    11/23/2010 3:28 PM Alexander Lewis MD  negative  Upper gastrointestinal endoscopy (351887452) on 7/17/2009 at 63 Years.  Comments:  11/23/2010 3:30 PM Alexander Lewis MD  gastritis  Esophagogastroduodenoscopy (266077446) on 5/28/2004 at 58 Years.  Comments:  9/12/2014 5:31 PM Alexander Gtz MD  Mild gastritis and incomplete Schatzki's ring  Colonoscopy (313765494) on 5/28/2004 at 58 Years.  Comments:  9/12/2014 5:29 PM Alexander Gtz MD  Normal  Cholecystectomy (04848975) in 2002 at 57 Years.  Spinal fusion (52927355) in 1995 at 50 Years.  Esophagogastroduodenoscopy (8645413235) in 1989 at 44 Years.  Endoscopy with biopsy in the month of 7/1989 at 43 Years.  Right knee arthroscopy.   Social History:        Alcohol Assessment            Current, 1 drinks/episode average.      Tobacco Assessment            Never      Substance Abuse Assessment: Denies Substance Abuse      Employment and Education Assessment            Employed                     Comments:                      12/06/2012 - Alexander Higginbotham MD                     [employer]       Home and Environment Assessment            Marital status:  (Living together).      Exercise and Physical Activity Assessment            Exercise frequency: Exercises but did not indicate how often..        Physical Examination   Vital Signs   10/10/2019 4:34 PM CDT Temperature Tympanic 96.7 DegF  LOW    Peripheral Pulse Rate 76 bpm    Systolic Blood Pressure 158 mmHg  HI    Diastolic Blood Pressure 89 mmHg  HI    Mean Arterial Pressure 112 mmHg    BP Site Right arm    BP Method Electronic      Measurements from flowsheet : Measurements   10/10/2019 4:34 PM CDT Height Measured - Standard 70 in    Weight Measured - Standard 153 lb    BSA 1.85 m2    Body Mass Index 21.95 kg/m2      General:  Alert and oriented, No acute distress.    HENT:  Normocephalic, Normal hearing.    Musculoskeletal:  Normal gait.    Integumentary:  No pallor.    Neurologic:  No focal deficits.    Cognition and Speech:  Speech clear and coherent, Functional cognition intact.    Psychiatric:  Cooperative, Appropriate mood & affect.       Impression and Plan   Diagnosis     Sleep apnea (IFU52-ZK G47.30).     Fibromyalgia (CDT07-EF M79.7).     Chronic pain (YMX10-OQ G89.29).     Chronic headache (PUW43-ZT R51).     Course:  Good response to treatment.    Patient Instructions:       Counseled: Patient, Diet, Activity, Verbalized understanding, Reviewed the importance of CPAP adherence to reduce daytime sleepiness and prevent excess mortality associated with sleep apnea .    Orders     Orders (Selected)   Outpatient Orders  Ordered  Return to Clinic (Request): RFV: Wellness, Return in 2 months, Instructions: lab before visit  Return to Office (Request): RFV: Annual cbc, chem 14, psa, tsh, ferritin. lipids, Return in Annually in June  Prescriptions  Prescribed  Norco 5 mg-325 mg oral tablet: 1 tab(s), po, q4-6 hrs, Instructions: Up to 5 tabs per day.  Fill 11/14/19, # 150 EA, 0 Refill(s), Type: Maintenance, Pharmacy: [email or web address]  #[id number], 1 tab(s) Oral q4-6 hrs,Instr:Up to 5 tabs per day. Fill 11/14/19  Norco 5 mg-325 mg oral tablet: 1 tab(s), po, q4-6 hrs, Instructions: Up to 5 tabs per day. Fill 10/15/2019, # 150 EA, 0 Refill(s), Type: Maintenance, Pharmacy: [facility] #[id number], 1 tab(s) Oral q4-6 hrs,Instr:Up to 5 tabs per day. Fill 10/15/2019.     Flu, Adacel, and Shingrix recommended.

## 2022-02-15 NOTE — LETTER
(Inserted Image. Unable to display)   June 12, 2020      PARIS FARMER      [address], [place] [id number]        Dear PARIS,    Thank you for selecting [facility] (previously [facility]) for your healthcare needs.  Below you will find the results of your recent tests done at our clinic.     Glucose high otherwise OK. Repeat fasting glucose some time this summer at your convenience.      Result Name Current Result Previous Result Reference Range   Sodium Level (mmol/L)  138 6/11/2020  139 8/23/2019 135 - 146   Potassium Level (mmol/L)  4.2 6/11/2020  4.9 8/23/2019 3.5 - 5.3   Chloride Level (mmol/L)  101 6/11/2020  102 8/23/2019 98 - 110   CO2 Level (mmol/L)  30 6/11/2020  31 8/23/2019 20 - 32   Glucose Level (mg/dL) ((H)) 131 6/11/2020 ((H)) 103 8/23/2019 65 - 99   BUN (mg/dL)  14 6/11/2020  13 8/23/2019 7 - 25   Creatinine Level (mg/dL)  0.79 6/11/2020  0.86 8/23/2019 0.70 - 1.18   eGFR (mL/min/1.73m2)  90 6/11/2020  87 8/23/2019 > OR = 60 -    Calcium Level (mg/dL)  9.7 6/11/2020  9.6 8/23/2019 8.6 - 10.3   Bilirubin Total (mg/dL)  0.8 6/11/2020  0.9 8/23/2019 0.2 - 1.2   Alkaline Phosphatase (unit/L)  97 6/11/2020  83 8/23/2019 35 - 144   AST/SGOT (unit/L)  17 6/11/2020  22 8/23/2019 10 - 35   ALT/SGPT (unit/L)  12 6/11/2020  14 8/23/2019 9 - 46   Protein Total (gm/dL)  7.3 6/11/2020  6.9 8/23/2019 6.1 - 8.1   Albumin Level (gm/dL)  3.9 6/11/2020  4.0 8/23/2019 3.6 - 5.1   Globulin  3.4 6/11/2020  2.9 8/23/2019 1.9 - 3.7   A/G Ratio  1.1 6/11/2020  1.4 8/23/2019 1.0 - 2.5   Cholesterol (mg/dL)  180 6/11/2020  172 8/23/2019  - <200   Non-HDL Cholesterol  119 6/11/2020  109 8/23/2019  - <130   HDL (mg/dL)  61 6/11/2020  63 8/23/2019 > OR = 40 -    Cholesterol/HDL Ratio  3.0 6/11/2020  2.7 8/23/2019  - <5.0   LDL  95 6/11/2020  93 8/23/2019    Triglyceride (mg/dL)  139 6/11/2020  70 8/23/2019  - <150   TSH (mIU/L)  1.25 6/11/2020  2.14 8/23/2019 0.40 - 4.50   Ferritin (ng/mL)  ((L)) 23 6/11/2020 ((L)) 21 8/23/2019 24 - 380   PSA (ng/mL)  0.3 6/11/2020  0.4 8/23/2019  - < OR = 4.0   WBC  5.3 6/11/2020  4.8 8/23/2019 3.8 - 10.8   Hgb (gm/dL)  14.0 6/11/2020  13.6 8/23/2019 13.2 - 17.1   Platelet  309 6/11/2020  285 8/23/2019 140 - 400       Please contact me or my assistant at [phone] if you have any questions.     Sincerely,        Alexander Higginbotham MD

## 2022-02-15 NOTE — TELEPHONE ENCOUNTER
---------------------  From: Kelly Pittman ([facility] Message Pool ([address] [place] [address]))   To: [facility] Message Pool ([address] [place] [address]);     Sent: 2/22/2021 9:31:19 AM CST  Subject: med dosing      Called pt and LVMTCB. Needing to know dosing of Mirtazapine and Gabapentin. [facility] did not have it on file. Per [facility] can fill #30 days when we know dosing.Pt called back at 1351. I spoke to pt at 1450. He was currently at work and will c/b once home and give dosage and instructions on both of those medications.Dalia Prince LM at 8:28am stating [facility] wanted pt's medication/dosing.  mirtazapine 30mg 1 tab PO hs  gabapentin 100mg capsules 2 caps PO TID  trazodone 50mg 1 tab PO hs PRN for sleep    [phone] OK to LM---------------------  From: Kelly Pittman ([employer] Message Pool ([facility]))   To: Alexander Higginbotham MD;     Sent: 2/23/2021 9:34:22 AM CST  Subject: FW: med dosing      Ok to fill?---------------------  From: Alexander Higginbotham MD   To: [employer] Message Pool ([address] [place] [address]);     Sent: 2/23/2021 10:02:08 AM CST  Subject: RE: med dosing      ok to fill for 3 months.Gabapentin is a controlled substance and you will have to fill it. I filled the other 2 meds---------------------  From: Kelly Pittman ([facility] Message Pool ([address] [place] [address]))   To: Alexander Higginbotham MD;     Sent: 2/23/2021 10:20:47 AM CST  Subject: FW: med dosing---------------------  From: Alexander Higginbotham MD   To: [facility] Message Pool ([id number] [facility] [place]);     Sent: 2/23/2021 10:49:13 AM CST  Subject: RE: med dosing      done

## 2022-02-15 NOTE — NURSING NOTE
Comprehensive Intake Entered On:  2019 4:25 PM CST    Performed On:  2019 4:19 PM CST by Kelly Pittman               Summary   Chief Complaint :   Pateint is here today c/o redness around mouth x 1 month,  cold x 4 days    Weight Measured :   155.0 lb(Converted to: 155 lb 0 oz, 70.31 kg)    Height Measured :   70 in(Converted to: 5 ft 10 in, 177.80 cm)    Body Mass Index :   22.24 kg/m2   Body Surface Area :   1.86 m2   Systolic Blood Pressure :   122 mmHg   Diastolic Blood Pressure :   60 mmHg   Mean Arterial Pressure :   81 mmHg   Peripheral Pulse Rate :   76 bpm   BP Site :   Right arm   BP Method :   Manual   HR Method :   Electronic   Temperature Tympanic :   97.0 DegF(Converted to: 36.1 DegC)  (LOW)    Oxygen Saturation :   98 %   Race :      Languages :   English   Ethnicity :   Not  or    Kelly Pittman - 2019 4:19 PM CST   Health Status   Medication History Verified? :   Yes   Medical History Verified? :   Yes   Pre-Visit Planning Status :   Completed   Tobacco Use? :   Never smoker   Kelly Pittman - 2019 4:19 PM CST   Demographics   Last Name :   Suresh   Address :   [address] [place] [address]   First Name :   Fer Pina Initial :   T   Responsible Party Date of Birth () :   1945 CDT   City :   [place]   State :   [place]   Zip Code :   [id number]   Kelly Pittman - 2019 4:19 PM CST   Providers Grid   Provider Name :    [facility] dental           Provider Specialty :    eye                Kelly Pittman - 2019 4:19 PM CST  Kelly Pittman - 2019 4:19 PM CST        Ancillary Services Grid   Name :    [facility]              Type of Service :    Pharmacy                Kelly Pittman - 2019 4:19 PM CST         Consents   Consent for Immunization Exchange :   Consent Granted   Consent for Immunizations to Providers :   Consent Granted   Kelly Pittman - 2019 4:19 PM CST   Meds / Allergies   (As Of: 2019 4:25:35 PM  CST)   Allergies (Active)   amitriptyline  Estimated Onset Date:   Unspecified ; Reactions:   Dizziness, Dry mouth ; Created By:   Aliya Atkins CMA; Reaction Status:   Active ; Category:   Drug ; Substance:   amitriptyline ; Type:   Allergy ; Updated By:   Aliya Atkins CMA; Source:   Paper Chart ; Reviewed Date:   11/4/2019 4:23 PM CST      beta blockers  Estimated Onset Date:   Unspecified ; Reactions:   Reactive airway disease ; Created By:   Alexander Higginbotham MD; Reaction Status:   Active ; Category:   Drug ; Substance:   beta blockers ; Type:   Side Effect ; Updated By:   Alexander Higginbotham MD; Reviewed Date:   11/4/2019 4:23 PM CST      Indocin  Estimated Onset Date:   <not entered> 4/1/1992 ; Reactions:   memory problems, Disorientation ; Created By:   Aliya Atkins CMA; Reaction Status:   Active ; Category:   Drug ; Substance:   Indocin ; Type:   Allergy ; Updated By:   Aliya Atkins CMA; Source:   Paper Chart ; Reviewed Date:   11/4/2019 4:23 PM CST      Paxil  Estimated Onset Date:   Unspecified ; Reactions:   Anxious, Trembles ; Created By:   Aliya Atkins CMA; Reaction Status:   Active ; Category:   Drug ; Substance:   Paxil ; Type:   Allergy ; Updated By:   Aliya Atkins CMA; Source:   Paper Chart ; Reviewed Date:   11/4/2019 4:23 PM CST        Medication List   (As Of: 11/4/2019 4:25:35 PM CST)   Prescription/Discharge Order    acetaminophen-hydrocodone  :   acetaminophen-hydrocodone ; Status:   Prescribed ; Ordered As Mnemonic:   Norco 5 mg-325 mg oral tablet ; Simple Display Line:   1 tab(s), po, q4-6 hrs, Up to 5 tabs per day.  Fill 11/14/19, 150 EA, 0 Refill(s) ; Ordering Provider:   Alexander Higginbotham MD; Catalog Code:   acetaminophen-hydrocodone ; Order Dt/Tm:   10/10/2019 4:53:23 PM CDT          acetaminophen-hydrocodone  :   acetaminophen-hydrocodone ; Status:   Prescribed ; Ordered As Mnemonic:   Norco 5 mg-325 mg oral tablet ; Simple Display Line:   1  tab(s), po, q4-6 hrs, Up to 5 tabs per day. Fill 10/15/2019, 150 EA, 0 Refill(s) ; Ordering Provider:   Alexander Higginbotham MD; Catalog Code:   acetaminophen-hydrocodone ; Order Dt/Tm:   10/10/2019 4:53:17 PM CDT          eletriptan  :   eletriptan ; Status:   Prescribed ; Ordered As Mnemonic:   eletriptan 40 mg oral tablet ; Simple Display Line:   40 mg, 1 tab(s), PO, Once, PRN: for migraine headache, 12 tab(s), 5 Refill(s) ; Ordering Provider:   Alexander Higginbotham MD; Catalog Code:   eletriptan ; Order Dt/Tm:   8/15/2019 4:15:35 PM CDT          Miscellaneous Rx Supply  :   Miscellaneous Rx Supply ; Status:   Prescribed ; Ordered As Mnemonic:   Replacment CPAP +9 cm H2o ; Simple Display Line:   See Instructions, Heated humidifier, heated tubing, filters, nasal or full face mask of choice with headgear.  Replacement cushions and supplies as needed.  Change supplies every 6 mos  Months = 99 (Lifetime), 1 EA, 11 Refill(s) ; Ordering Provider:   Alexander Higginbotham MD; Catalog Code:   Miscellaneous Rx Supply ; Order Dt/Tm:   4/13/2017 3:34:47 PM CDT            Home Meds    ferrous sulfate  :   ferrous sulfate ; Status:   Documented ; Ordered As Mnemonic:   ferrous sulfate 325 mg (65 mg elemental iron) oral tablet ; Simple Display Line:   325 mg, 1 tab(s), po, daily, 0 Refill(s) ; Catalog Code:   ferrous sulfate ; Order Dt/Tm:   1/25/2018 4:43:44 PM CST          triamcinolone nasal  :   triamcinolone nasal ; Status:   Documented ; Ordered As Mnemonic:   Nasacort ; Catalog Code:   triamcinolone nasal ; Order Dt/Tm:   6/17/2014 3:37:34 PM CDT

## 2022-02-15 NOTE — NURSING NOTE
Comprehensive Intake Entered On:  2/6/2020 4:36 PM CST    Performed On:  2/6/2020 4:32 PM CST by Kelly Pittman               Summary   Chief Complaint :   Rx refill. Sleep issues.    Weight Measured :   160.08 lb(Converted to: 160 lb 1 oz, 72.61 kg)    Height Measured :   70 in(Converted to: 5 ft 10 in, 177.80 cm)    Body Mass Index :   22.97 kg/m2   Body Surface Area :   1.89 m2   Systolic Blood Pressure :   124 mmHg   Diastolic Blood Pressure :   78 mmHg   Mean Arterial Pressure :   93 mmHg   Peripheral Pulse Rate :   69 bpm   BP Site :   Right arm   BP Method :   Manual   HR Method :   Electronic   Temperature Tympanic :   97.5 DegF(Converted to: 36.4 DegC)  (LOW)    Oxygen Saturation :   98 %   Race :      Languages :   English   Ethnicity :   Not  or    Kelly Pittman - 2/6/2020 4:32 PM CST   Health Status   Allergies Verified? :   Yes   Medication History Verified? :   Yes   Medical History Verified? :   Yes   Pre-Visit Planning Status :   Completed   Tobacco Use? :   Never smoker   Kelly Pittman - 2/6/2020 4:32 PM CST   Consents   Consent for Immunization Exchange :   Consent Granted   Consent for Immunizations to Providers :   Consent Granted   Kelly Pittman - 2/6/2020 4:32 PM CST   Meds / Allergies   (As Of: 2/6/2020 4:36:35 PM CST)   Allergies (Active)   amitriptyline  Estimated Onset Date:   Unspecified ; Reactions:   Dizziness, Dry mouth ; Created By:   Aliya Marcano; Reaction Status:   Active ; Category:   Drug ; Substance:   amitriptyline ; Type:   Allergy ; Updated By:   Aliya Marcano; Source:   Paper Chart ; Reviewed Date:   2/6/2020 4:35 PM CST      beta blockers  Estimated Onset Date:   Unspecified ; Reactions:   Reactive airway disease ; Created By:   Alexander Higginbotham MD; Reaction Status:   Active ; Category:   Drug ; Substance:   beta blockers ; Type:   Side Effect ; Updated By:   Alexander Higginbotham MD; Reviewed Date:   2/6/2020 4:35 PM  CST      Indocin  Estimated Onset Date:   <not entered> 4/1/1992 ; Reactions:   memory problems, Disorientation ; Created By:   Aliya Marcano; Reaction Status:   Active ; Category:   Drug ; Substance:   Indocin ; Type:   Allergy ; Updated By:   Aliya Marcano; Source:   Paper Chart ; Reviewed Date:   2/6/2020 4:35 PM CST      Paxil  Estimated Onset Date:   Unspecified ; Reactions:   Anxious, Trembles ; Created By:   Aliya Marcano; Reaction Status:   Active ; Category:   Drug ; Substance:   Paxil ; Type:   Allergy ; Updated By:   Aliya Marcano; Source:   Paper Chart ; Reviewed Date:   2/6/2020 4:35 PM CST        Medication List   (As Of: 2/6/2020 4:36:35 PM CST)   Prescription/Discharge Order    eletriptan  :   eletriptan ; Status:   Prescribed ; Ordered As Mnemonic:   eletriptan 40 mg oral tablet ; Simple Display Line:   40 mg, 1 tab(s), PO, Once, PRN: for migraine headache, 12 tab(s), 5 Refill(s) ; Ordering Provider:   Alexander Higginbotham MD; Catalog Code:   eletriptan ; Order Dt/Tm:   12/12/2019 4:42:29 PM CST          acetaminophen-hydrocodone  :   acetaminophen-hydrocodone ; Status:   Prescribed ; Ordered As Mnemonic:   Norco 5 mg-325 mg oral tablet ; Simple Display Line:   1 tab(s), po, q4-6 hrs, Up to 5 tabs per day. Fill 1/11/2020, 150 EA, 0 Refill(s) ; Ordering Provider:   Alexander Higginbotham MD; Catalog Code:   acetaminophen-hydrocodone ; Order Dt/Tm:   12/12/2019 4:41:43 PM CST          acetaminophen-hydrocodone  :   acetaminophen-hydrocodone ; Status:   Prescribed ; Ordered As Mnemonic:   Norco 5 mg-325 mg oral tablet ; Simple Display Line:   1 tab(s), po, q4-6 hrs, Up to 5 tabs per day., 150 EA, 0 Refill(s) ; Ordering Provider:   Alexander Higginbotham MD; Catalog Code:   acetaminophen-hydrocodone ; Order Dt/Tm:   12/12/2019 4:41:33 PM CST          Miscellaneous Rx Supply  :   Miscellaneous Rx Supply ; Status:   Prescribed ; Ordered As Mnemonic:   Replacment CPAP +9 cm H2o ; Simple  Display Line:   See Instructions, Heated humidifier, heated tubing, filters, nasal or full face mask of choice with headgear.  Replacement cushions and supplies as needed.  Change supplies every 6 mos  Months = 99 (Lifetime), 1 EA, 11 Refill(s) ; Ordering Provider:   Alexander Higginbotham MD; Catalog Code:   Miscellaneous Rx Supply ; Order Dt/Tm:   4/13/2017 3:34:47 PM CDT            Home Meds    ferrous sulfate  :   ferrous sulfate ; Status:   Documented ; Ordered As Mnemonic:   ferrous sulfate 325 mg (65 mg elemental iron) oral tablet ; Simple Display Line:   325 mg, 1 tab(s), po, daily, 0 Refill(s) ; Catalog Code:   ferrous sulfate ; Order Dt/Tm:   1/25/2018 4:43:44 PM CST          triamcinolone nasal  :   triamcinolone nasal ; Status:   Documented ; Ordered As Mnemonic:   Nasacort ; Catalog Code:   triamcinolone nasal ; Order Dt/Tm:   6/17/2014 3:37:34 PM CDT

## 2022-02-15 NOTE — TELEPHONE ENCOUNTER
---------------------  From: Quyen Matamoros RN (Phone Messages Pool ([address] [place] [address]))   To: [employer] Pool ([address] [place] [address]);     Sent: 8/10/2020 1:23:20 PM CDT  Subject: eletriptan refill     Time of Call:  1200  Return call at:1316     Person Calling:  patient  Phone number:  [phone]    Note:   Patient is requesting a refill of his eletriptan. He asks for refill of his Norco as well.  This was sent in earlier today. I let the patient know this and to schedule an appointment for his next [place] refill before he runs out.     Date of last office visit: depression 7/17/20     Prescription for Eletriptan 40 mg last written on: 8/4/2020   Quantity: 6 Refill(s): 5 ?This was just filled please advise.---------------------  From: Kelly Pittman ([employer] Message Pool ([address] [place] [address]))   To: Alexander Higginbotham MD;     Sent: 8/10/2020 1:43:54 PM CDT  Subject: FW: eletriptan refill---------------------  From: Alexander Higginbotham MD   To: [employer] [facility] ([address] [place] [address]);     Sent: 8/10/2020 1:59:09 PM CDT  Subject: RE: eletriptan refill     OK to refill ElitriptanSame # and quantity?---------------------  From: Kelly Pittman ([employer] Message Pool ([address] [place] [address]))   To: Alexander Higginbotham MD;     Sent: 8/10/2020 2:08:27 PM CDT  Subject: FW: eletriptan refill---------------------  From: Alexander Higginbotham MD   To: [employer] Message Pool ([address] [place] [address]);     Sent: 8/10/2020 2:35:52 PM CDT  Subject: RE: eletriptan refill     yes pleaseFilled per [facility]. Patient notified.

## 2022-02-15 NOTE — TELEPHONE ENCOUNTER
PARIS FARMER : 1945 MRN: [id number]  AFTER HOURS PHONE NOTE: [phone]  Time Paged: 5:48 p.m.   Time Call Retuned: 5:50 p.m.   S: I talked to the patient.  He has run out of a prescription and does not want to run out before his appointment.    P: I have called in a refill for the next two weeks.     D:  2019  T:  2019                                                 Mathew Reis MD/sq

## 2022-02-15 NOTE — PROGRESS NOTES
"   Patient:   PARIS FARMER            MRN: [id number]            FIN: [id number]               Age:   72 years     Sex:  Male     :  1945   Associated Diagnoses:   Common Migraine; Fibromyalgia; H/O: Iron Deficiency Anemia; Sleep apnea; Periodic Limb Movement Sleep Disorder; Glucose intolerance (impaired glucose tolerance)   Author:   Alexander Higginbotham MD      Visit Information      Date of Service: 2018 04:16 pm  Performing Location: [facility]  Encounter#: [id number]      Primary Care Provider (PCP):  Alexander Higginbotham MD    NPI# [id number]      Referring Provider:  Alexander Higginbotham MD# [id number]   Visit type:  Scheduled follow-up.    Accompanied by:  No one.    Source of history:  Self.    History limitation:  None.       Chief Complaint   2018 4:19 PM CST    Med refills      History of Present Illness    The patient is here for refill of [place] and Repl[place].  He has chronic fibromyalgia pain and migrainous headache.  He does well on his current regimen.  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  He is taking iron once a day.  He had a history of periodic limb movement disorder but no longer has symptoms.  He has chronic iron deficiency.  He had negative workup in  including EGD and colonoscopy.  He tells me he feels \"great\".  I discussed with him IV iron replacement as it has been very difficult to get his ferritin up but he does not wish to pursue that.   Has the patients condition changed significantly since their last visit?     no  Has the patient been compliant with treatments, including non-opioid treatments?    yes  Has there been any aberrant opioid behavior since the last visit?     no  Is the total opioid dose less than 90 morphine equivalents?    yes  Is there reasonable progress toward meeting established functional goals?     remains active  Was the last drug test consistent with prescribed medications?     " yes  Is another  drug test due at this visit?     no  Has the ePDMP been reviewed?     yes  Is the patient complying with their signed Controlled Substance Abuse Agreement?     yes  Based on all the above, is the patient still a candidate for chronic opioid therapy?       yes         Review of Systems   Constitutional:  No fatigue, No decreased activity, No weight loss.    Eye:  No recent visual problem.    Respiratory:  No shortness of breath, No sputum production, No wheezing.    Cardiovascular:  No chest pain, No palpitations, No peripheral edema.    Gastrointestinal:  No nausea, No vomiting, No diarrhea, No constipation, No heartburn, No abdominal pain.    Genitourinary:  No hematuria.    Hematology/Lymphatics:  No bruising tendency, No bleeding tendency.    Musculoskeletal:  Muscle pain, No joint pain, No muscle weakness, No gait disturbance, No joint swelling.    Neurologic:  Negative except as documented in history of present illness.    Psychiatric:  No anxiety, No depression.       Health Status   Allergies:    Allergic Reactions (Selected)  Severity Not Documented  Amitriptyline (Dizziness and dry mouth)  Indocin (Memory problems and disorientation)  Paxil (Anxious and trembles)  Nonallergic Reactions (Selected)  Severity Not Documented  Beta blockers (Reactive airway disease)   Medications:  (Selected)   Prescriptions  Prescribed  Norco 5 mg-325 mg oral tablet: 1 tab(s), po, q4-6 hrs, Instructions: Up to 5 daily. Fill 2/24/118, # 150 EA, 0 Refill(s), Type: Maintenance, Pharmacy: [id number], 1 tab(s) po q4-6 hrs,Instr:Up to 5 daily. Fill 2/24/118  Norco 5 mg-325 mg oral tablet: 1 tab(s), po, q4-6 hrs, Instructions: Up to 5 dayl., # 150 EA, 0 Refill(s), Type: Maintenance, Pharmacy: [id number], 1 tab(s) po q4-6 hrs,Instr:Up to 5 dayl.  Relpax 40 mg oral tablet: See Instructions, Instructions: Take 1 tab by mouth once as needed for migraine headache may repeat dose once in 2 hrs, # 12  tab(s), 5 Refill(s), Type: Soft Stop, Pharmacy: [id number], Take 1 tab by mouth once as needed for migraine headach...  Replacment CPAP +9 cm H2o: Replacment CPAP +9 cm H2o, See Instructions, Instructions: Heated humidifier, heated tubing, filters, nasal or full face mask of choice with headgear.  Replacement cushions and supplies as needed.  Change supplies every 6 mos  Months = 99 (Lifetime),...  albuterol 90 mcg/inh inhalation aerosol: 2 puff(s), inh, q4-6 hrs, PRN: as needed for cough, # 1 EA, 0 Refill(s), Type: Maintenance, Pharmacy: [id number], 2 puff(s) inh q4-6 hrs,PRN:as needed for cough  Documented Medications  Documented  Nasacort: 0 Refill(s), Type: Maintenance  ferrous sulfate 325 mg (65 mg elemental iron) oral tablet: 1 tab(s) ( 325 mg ), po, daily, 0 Refill(s), Type: Maintenance   Problem list:    All Problems  Acid Reflux / ICD-9-.81 / Confirmed  Benign prostatic hypertrophy (BPH) with nocturia / SNOMED CT 0034418849 / Confirmed  Chronic Headache / ICD-9-.0 / Confirmed  Common Migraine / ICD-9-.10 / Confirmed  Fibromyalgia / SNOMED CT 98874390 / Confirmed  H/O: Iron Deficiency Anemia / ICD-9-CM V12.3 / Confirmed  IBS (Irritable Bowel Syndrome) / ICD-9-.1 / Confirmed  Glucose intolerance (impaired glucose tolerance) / SNOMED CT 59909752 / Confirmed  NSAID-Induced Gastric Ulcer / ICD-9-.90 / Confirmed  Periodic Limb Movement Sleep Disorder / ICD-9-.51 / Confirmed  Rhinitis, chronic / ICD-9-.0 / Confirmed  Sleep apnea / ICD-9-.09 / Confirmed  Varicose veins / SNOMED CT 413848440 / Confirmed  Resolved: *Hospitalized@[facility] - Acute bronchitis  Resolved: *Hospitalized@[facility] - NSAID gastropathy  Resolved: Depression with anxiety / ICD-9-.4  Canceled: Common Migraine / ICD-9-.10      Histories   Past Medical History:    Active  Sleep apnea (786.09): Onset on 11/22/2004 at 59 years.  Comments:  9/12/2014 CDT 5:28 PM GIGI Higginbotham  Alexander MARCUS  [facility]: RDI 30.1 with oximetry james 63%. 4/1/2005 adequate CPAP titration to 8. Optimal CPAP titration to 9 at [facility] 10/30/2009.  Chronic Headache (784.0)  Acid Reflux (530.81)  IBS (Irritable Bowel Syndrome) (564.1)  Fibromyalgia (67760934)  Periodic Limb Movement Sleep Disorder (327.51)  Common Migraine (346.10)  H/O: Iron Deficiency Anemia (V12.3)  Resolved  *Hospitalized@[facility] - Acute bronchitis: Onset on 3/7/2012 at 66 years.  Resolved.  *Hospitalized@[facility] - NSAID gastropathy: Onset on 7/25/2011 at 65 years.  Resolved.  Depression with anxiety (300.4):  Resolved.   Family History:    Colitis  Sister  [place] - Cancer  Mother     Procedure history:    Capsule endoscopy ([id number]) on 12/30/2009 at 64 Years.  Comments:  11/23/2010 3:28 PM - Alexander Higginbotham MD  negative  Colonoscopy ([id number]) on 12/3/2009 at 64 Years.  Comments:  9/12/2014 5:22 PM Alexander Osborne MD  Normal    11/23/2010 3:28 PM Alexander Osborne MD  negative  Upper gastrointestinal endoscopy ([id number]) on 7/17/2009 at 63 Years.  Comments:  11/23/2010 3:30 PM Alexander Osborne MD  gastritis  Esophagogastroduodenoscopy ([id number]) on 5/28/2004 at 58 Years.  Comments:  9/12/2014 5:31 PM - Alexander Higginbotham MD  Mild gastritis and incomplete Schatzki's ring  Colonoscopy ([id number]) on 5/28/2004 at 58 Years.  Comments:  9/12/2014 5:29 PM Alexander Osborne MD  Normal  Cholecystectomy ([id number]) in 2002 at 57 Years.  Spinal fusion ([id number]) in 1995 at 50 Years.  Esophagogastroduodenoscopy ([id number]) in 1989 at 44 Years.  Endoscopy with biopsy in the month of 7/1989 at 43 Years.  Right knee arthroscopy.   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        Physical Examination   Vital Signs   1/25/2018 4:19 PM CST Temperature Tympanic 97.7 DegF  LOW    Peripheral Pulse Rate 74 bpm    HR Method Electronic    Systolic Blood Pressure 126 mmHg    Diastolic Blood Pressure 84 mmHg  HI    Mean Arterial Pressure 98 mmHg    BP Site Right arm    BP Method Electronic      Measurements from flowsheet : Measurements   1/25/2018 4:19 PM CST Height Measured - Standard 70 in    Weight Measured - Standard 165 lb    BSA 1.92 m2    Body Mass Index 23.67 kg/m2      General:  Alert and oriented, No acute distress.    Eye:  Normal conjunctiva.    HENT:  Normocephalic, Normal hearing, Oral mucosa is moist.    Neck:  Supple, Non-tender, No carotid bruit, No lymphadenopathy, No thyromegaly.    Respiratory:  Lungs are clear to auscultation, Respirations are non-labored.    Cardiovascular:  Normal rate, Regular rhythm, No murmur, No gallop, Normal peripheral perfusion, No edema.    Gastrointestinal:  Soft, Non-tender, Non-distended, No organomegaly.    Musculoskeletal:  Normal gait.    Integumentary:  No pallor.    Neurologic:  No focal deficits.    Cognition and Speech:  Speech clear and coherent, Functional cognition intact.    Psychiatric:  Cooperative, Appropriate mood & affect.       Review / Management   Results review:  Lab results   9/30/2017 8:57 AM CDT Sodium Level 139 mmol/L    Potassium Level 4.8 mmol/L    Chloride Level 102 mmol/L    CO2 Level 31 mmol/L    Glucose Level 109 mg/dL  HI    BUN 15 mg/dL    Creatinine Level 0.80 mg/dL    BUN/Creat Ratio NOT APPLICABLE    eGFR 91 mL/min/1.73m2    eGFR African American 105 mL/min/1.73m2    Calcium Level 9.5 mg/dL    Bilirubin Total 0.8 mg/dL    Alkaline Phosphatase 124 unit/L  HI    AST/SGOT 24 unit/L    ALT/SGPT 17 unit/L    Protein Total 7.3 gm/dL    Albumin Level 4.1 gm/dL    Globulin 3.2    A/G Ratio 1.3    Cholesterol 163 mg/dL    Non-HDL Cholesterol 102    HDL 61 mg/dL     Cholesterol/HDL Ratio 2.7    LDL 86    Triglyceride 73 mg/dL    TSH 1.53 mIU/L    Ferritin 19 ng/mL  LOW    PSA 0.4 ng/mL    WBC 6.1    RBC 5.05    Hgb 13.8 gm/dL    Hct 42.7 %    MCV 84.6 fL    MCH 27.3 pg    MCHC 32.3 gm/dL    RDW 13.1 %    Platelet 332    MPV 9.6 fL   6/8/2017 8:03 PM CDT U pH 6.07    U Creatinine 127.7 mg/dL    Pain Management Prescribed Drug 1 Hydrocodone    U Amphetamines NEGATIVE ng/mL    U Amphetamines medMATCH CONSISTENT    U Barbiturate Scrn NEGATIVE ng/mL    U Cari medMATCH CONSISTENT    U Benzodia Scrn NEGATIVE ng/mL    U Benzodia medMATCH CONSISTENT    U Cannabis Metabolite medMATCH CONSISTENT    U Cannabis Metabolite Scrn NEGATIVE ng/mL    U Cocaine Metabolite Scrn NEGATIVE ng/mL    U Cocaine Metabolite medMATCH CONSISTENT    U Codeine Scrn NEGATIVE ng/mL    U Codeine medMATCH CONSISTENT    U Hydrocodone medMATCH CONSISTENT    U Hydrocodone Scrn 1,147 ng/mL  HI    U Hydromorphone medMATCH CONSISTENT    U Hydromorphone Scrn 543 ng/mL  HI    U medMATCH Comments See comment    U Methadone Scrn NEGATIVE ng/mL    U Methadone Scrn medMATCH CONSISTENT    U Morphine medMATCH CONSISTENT    U Morphine Scrn NEGATIVE ng/mL    U Norhydrocodone 2,025 ng/mL  HI    U Norhydrocodone medMatch CONSISTENT    U Opiates Scrn POSITIVE ng/mL    U Oxidants NEGATIVE mcg/mL    U Oxycodone Scrn NEGATIVE ng/mL    U Oxycodone Scrn medMATCH CONSISTENT    U PCP Scrn NEGATIVE ng/mL    U PCP Scrn medMATCH CONSISTENT   .       Impression and Plan   Diagnosis     Common Migraine (GQY53-MZ G43.009).     Fibromyalgia (IGW91-SY M79.7).     Course:  Good response to treatment.    Patient Instructions:       Counseled: Patient, Diet, Activity, Verbalized understanding, Reviewed the importance of CPAP adherence to reduce daytime sleepiness and prevent excess mortality associated with sleep apnea .    Orders     Orders (Selected)   Outpatient Orders  Ordered  Return to Clinic (Request): RFV: Pain, Return in 2 months  Return to  Clinic (Request): RFV: Wellness, Return in 2 months  Return to Office (Request): RFV: Annual cbc, chem 14, psa, tsh, ferritin. lipids, Return in Annually in September  Prescriptions  Prescribed  Norco 5 mg-325 mg oral tablet: 1 tab(s), po, q4-6 hrs, Instructions: Up to 5 daily. Fill 2/24/118, # 150 EA, 0 Refill(s), Type: Maintenance, Pharmacy: [facility] #[id number], 1 tab(s) po q4-6 hrs,Instr:Up to 5 daily. Fill 2/24/118  Norco 5 mg-325 mg oral tablet: 1 tab(s), po, q4-6 hrs, Instructions: Up to 5 dayl., # 150 EA, 0 Refill(s), Type: Maintenance, Pharmacy: [facility] #[id number], 1 tab(s) po q4-6 hrs,Instr:Up to 5 dayl.  Relpax 40 mg oral tablet: See Instructions, Instructions: Take 1 tab by mouth once as needed for migraine headache may repeat dose once in 2 hrs, # 12 tab(s), 5 Refill(s), Type: Soft Stop, Pharmacy: [facility] #[id number], Take 1 tab by mouth once as needed for migraine headach....     Diagnosis     H/O: Iron Deficiency Anemia (HVT54-JQ Z86.2).     Course:  Unchanged.    Plan:  65 mg iron daily.    Patient Instructions:       Counseled: Patient, Regarding medications, Diet, Verbalized understanding.    Diagnosis     Sleep apnea (EAX50-GG G47.30).     Periodic Limb Movement Sleep Disorder (PFS56-WU G47.61).     Patient Instructions:       Counseled: Patient, Verbalized understanding, Reviewed the importance of CPAP adherence to reduce daytime sleepiness and prevent excess mortality associated with sleep apnea .    Orders     Device download.     Diagnosis     Glucose intolerance (impaired glucose tolerance) (CES38-IF R73.02).     Course:  Unchanged.    Patient Instructions:       Counseled: Patient, Diet, Activity, Verbalized understanding.

## 2022-02-15 NOTE — PROGRESS NOTES
Patient:   PARIS FARMER            MRN: [id number]            FIN: [id number]               Age:   74 years     Sex:  Male     :  1945   Associated Diagnoses:   Preoperative clearance; Right inguinal hernia; Preoperative clearance; Right inguinal hernia   Author:   Juan Francisco Jones MD      Chief Complaint   2020 12:06 PM CDT    preop--surgery 2020 for ARLENE w/ Dr. Stone at [facility].  is scheduled for COVID test at [facility] Monday, still awaiting results from 2020.      Preoperative Information   Indication for surgery:  Symptomatic right inguinal hernia.    Accompanied by:  No one.    Source of history:  Self, Medical record.           Review of Systems   Constitutional:  No fever, No chills, No sweats, No weakness, No fatigue.    Eye:  No recent visual problem.    Ear/Nose/Mouth/Throat:  No decreased hearing, No nasal congestion, No sore throat.    Respiratory:  No shortness of breath, No cough.    Cardiovascular:  Negative, No chest pain, No palpitations, No peripheral edema.    Gastrointestinal:  No nausea, No vomiting, No diarrhea, No constipation, No heartburn.    Genitourinary:  No dysuria, No change in urine stream.    Hematology/Lymphatics:  No bruising tendency, No bleeding tendency.    Endocrine:  No cold intolerance, No heat intolerance.    Immunologic:  Negative.    Musculoskeletal:  No back pain, No neck pain, No joint pain, No muscle pain.    Integumentary:  No rash, No dryness, No skin lesion.    Neurologic:  Alert and oriented X4, No headache.    Psychiatric:  No anxiety, No depression.       Health Status   Allergies:    Allergic Reactions (Selected)  Severity Not Documented  Amitriptyline (Dizziness and dry mouth)  Indocin (Memory problems and disorientation)  Paxil (Anxious and trembles)  Nonallergic Reactions (Selected)  Severity Not Documented  Beta blockers (Reactive airway disease)   Medications:  (Selected)   Prescriptions  Prescribed  Lexapro 10 mg oral  tablet: = 1 tab(s) ( 10 mg ), Oral, daily, # 30 tab(s), 5 Refill(s), Type: Maintenance, Pharmacy: [facility] #[id number], 1 tab(s) Oral daily, 70, in, 07/17/20 16:01:00 CDT, Height Measured, 161.2, lb, 06/11/20 9:14:00 CDT, Weight Measured  Norco 5 mg-325 mg oral tablet: 1 tab(s), po, q4-6 hrs, Instructions: Up to 5 tabs per day., # 35 EA, 0 Refill(s), Type: Maintenance, Pharmacy: [facility] #[id number], 1 tab(s) Oral q4-6 hrs,Instr:Up to 5 tabs per day., 70, in, 07/22/20 9:41:00 CDT, Height Measured, Weight Matthew...  Replacment CPAP +9 cm H2o: Replacment CPAP +9 cm H2o, See Instructions, Instructions: Heated humidifier, heated tubing, filters, nasal or full face mask of choice with headgear.  Replacement cushions and supplies as needed.  Change supplies every 6 mos  Months = 99 (Lifetime),...  Zofran ODT 8 mg oral tablet, disintegrating: = 1 tab(s) ( 8 mg ), po, q8 hrs, PRN: for nausea/vomiting, # 12 tab(s), 0 Refill(s), Type: Maintenance, Pharmacy: [facility] #[id number], 1 tab(s) Oral q8 hrs,PRN:for nausea/vomiting, 70, in, 07/22/20 9:41:00 CDT, Height Measured, 161.2, lb, 06/1...  eletriptan 40 mg oral tablet: = 1 tab(s) ( 40 mg ), PO, Once, PRN: for migraine headache, # 6 tab(s), 5 Refill(s), Type: Soft Stop, Pharmacy: [facility] #[id number], 1 tab(s) Oral once,PRN:for migraine headache, 70, in, 07/22/20 9:41:00 CDT, Height Measured, 161.2, lb, 06/11/...  Documented Medications  Documented  Nasacort: 0 Refill(s), Type: Maintenance  ferrous sulfate 325 mg (65 mg elemental iron) oral tablet: 1 tab(s) ( 325 mg ), po, daily, 0 Refill(s), Type: Maintenance   Problem list:    All Problems  Sleep apnea / SNOMED CT 551769032 / Confirmed  Chronic pain / SNOMED CT 313303088 / Confirmed  Rhinitis, chronic / SNOMED CT 469292288 / Confirmed  Glucose intolerance (impaired glucose tolerance) / SNOMED CT 18538760 / Confirmed  NSAID-induced gastric ulcer / SNOMED CT 6900676141 / Confirmed  IBS (irritable  bowel syndrome) / SNOMED CT 12117967 / Confirmed  Varicose veins / SNOMED CT 137113355 / Confirmed  BPH associated with nocturia / SNOMED CT 7124255641 / Confirmed  H/O: iron deficiency anemia / SNOMED CT 498598335 / Confirmed  Periodic limb movement sleep disorder / SNOMED CT 6612269109 / Confirmed  Chronic headache / SNOMED CT 9179908747 / Confirmed  Acid reflux / SNOMED CT 728617447 / Confirmed  Use of opiates for therapeutic purposes / SNOMED CT 360737547 / Confirmed  Fibromyalgia / SNOMED CT 53170035 / Confirmed  Common migraine / SNOMED CT 05641004 / Confirmed  Resolved: *Hospitalized@[facility] NSAID gastropathy  Resolved: *Hospitalized@[facility] Acute bronchitis  Resolved: Depression with anxiety / SNOMED CT 536076006  Canceled: Common Migraine / ICD-9-.10      Histories   Past Medical History:    Active  Sleep apnea (054675256): Onset on 11/22/2004 at 59 years.  Comments:  9/12/2014 CDT 5:28 PM Alexander Gtz MD  [facility]: RDI 30.1 with oximetry james 63%. 4/1/2005 adequate CPAP titration to 8. Optimal CPAP titration to 9 at [facility] 10/30/2009.  Chronic headache (2976985231)  Acid reflux (913607948)  IBS (irritable bowel syndrome) (01457630)  Fibromyalgia (01843128)  Periodic limb movement sleep disorder (7928079904)  Common migraine (47598079)  NSAID-induced gastric ulcer (1394982839)  H/O: iron deficiency anemia (098809367)  Rhinitis, chronic (430474811)  Resolved  *Hospitalized@[facility] Acute bronchitis: Onset on 3/7/2012 at 66 years.  Resolved.  *Hospitalized@[facility] - NSAID gastropathy: Onset on 7/25/2011 at 65 years.  Resolved.  Depression with anxiety (540393766):  Resolved.   Family History:    Colitis  Sister  CA - Cancer  Mother: onset at 60 .     Procedure history:    Capsule endoscopy (SNOMED CT 5768400165) on 12/30/2009 at 64 Years.  Comments:  11/23/2010 3:28 PM Alexander Lewis MD  negative  Colonoscopy (SNOMED CT 497322952) on 12/3/2009 at 64  Years.  Comments:  9/12/2014 5:22 PM Alexander Gtz MD  Normal    11/23/2010 3:28 PM Alexander Lewis MD  negative  Upper gastrointestinal endoscopy (SNOMED CT 031457480) on 7/17/2009 at 63 Years.  Comments:  11/23/2010 3:30 PM Alexander Lewis MD  gastritis  Esophagogastroduodenoscopy (SNOMED CT 249204253) on 5/28/2004 at 58 Years.  Comments:  9/12/2014 5:31 PM Alexander Gtz MD  Mild gastritis and incomplete Schatzki's ring  Colonoscopy (SNOMED CT 960369111) on 5/28/2004 at 58 Years.  Comments:  9/12/2014 5:29 PM Alexander Gtz MD  Normal  Cholecystectomy (SNOMED CT 09204654) in 2002 at 57 Years.  Spinal fusion (SNOMED CT 81367121) in 1995 at 50 Years.  Esophagogastroduodenoscopy (SNOMED CT 6799312780) in 1989 at 44 Years.  Endoscopy with biopsy in the month of 7/1989 at 43 Years.  Right knee arthroscopy.   Social History:        Alcohol Assessment            Current, 1 drinks/episode average.      Tobacco Assessment            Never      Substance Abuse Assessment: Denies Substance Abuse      Employment and Education Assessment            Employed                     Comments:                      12/06/2012 - Alexander Higginbotham MD                     group home      Home and Environment Assessment            Marital status:  (Living together).      Exercise and Physical Activity Assessment            Exercise frequency: Exercises but did not indicate how often..       Has no history of anemia.  Has no history of DVT or pulmonary embolism.  Has no personal history of bleeding problems.   Has no personal or family history of anesthesia reactions.  Patient does not have active tuberculosis.    S/he has not taken aspirin or aspirin containing products in the last week.     S/he has not taken Plavix (Clopidogrel) in the last 2 weeks.    S/he has not taken warfarin in the past week.    S/he has not been on corticosteroids for more than 2 weeks recently.      S/he is not DNR before,  during or after surgery.    Chest pain / SOB walking up 2 flights of steps:  no  Pain in neck or jaw:  no  CAD MI:  no  Afib:  no  Heart Failure:  no  Asthma  or Bronchitis:  no  Diabetes:  no  Seizure Disorder:  no  CKD:  no  Thyroid Disease:  no  Liver Disease:  no  CVA:  no         Physical Examination   Vital Signs   8/5/2020 12:06 PM CDT Temperature Tympanic 97.4 DegF  LOW    Peripheral Pulse Rate 83 bpm    Pulse Site Radial artery    HR Method Manual    Systolic Blood Pressure 132 mmHg  HI    Diastolic Blood Pressure 80 mmHg    Mean Arterial Pressure 97 mmHg    BP Site Right arm    BP Method Manual    Oxygen Saturation 95 %      Measurements from flowsheet : Measurements   8/5/2020 12:06 PM CDT Height Measured - Standard 70 in    Height/Length Estimated 7 in    Weight Measured - Standard 156.8 lb    BSA 1.87 m2    Body Mass Index 22.5 kg/m2      General:  Alert and oriented, No acute distress.    Eye:  Pupils are equal, round and reactive to light, Extraocular movements are intact, Normal conjunctiva.    HENT:  Normocephalic, Tympanic membranes are clear, Oral mucosa is moist, No pharyngeal erythema, No sinus tenderness.    Neck:  Supple, Non-tender, No carotid bruit, No lymphadenopathy, No thyromegaly.    Respiratory:  Lungs are clear to auscultation, Respirations are non-labored, Breath sounds are equal, No chest wall tenderness.    Cardiovascular:  Normal rate, Regular rhythm, No murmur, No gallop, Good pulses equal in all extremities, Normal peripheral perfusion, No edema.    Gastrointestinal:  Soft, Non-tender, Non-distended, Normal bowel sounds, No organomegaly.    Genitourinary:  No costovertebral angle tenderness.         Groin/ inguinal region: Right, Inguinal hernia, Tenderness.    Lymphatics:  WNL.    Musculoskeletal:  Normal range of motion, Normal strength, No tenderness, No swelling, No deformity.    Integumentary:  Warm, Dry, No rash.    Neurologic:  Alert, Oriented, Normal sensory, Normal motor  function, No focal deficits.    Psychiatric:  Cooperative, Appropriate mood & affect.       Review / Management         ECG interpretation:  Date:  8/5/2020.     Indication: pre-operative exam.     EKG findings   Rhythm: heart rate  86  beats/min, sinus normal, heart block RBBB.     Axis: normal axis, normal configuration.     Intervals: normal.     P waves: normal.     QRS complex: Q waves present lead V2.     Interpretation: Abnormal ECG, no acute changes.   .       Impression and Plan   Diagnosis     Preoperative clearance (YTU09-QF Z01.818).     Right inguinal hernia (RFR41-CS K40.90).     Condition:  Stable, very low risk for cardiac or pulmonary complications, The patient is cleared for anesthesia and surgery.

## 2022-02-15 NOTE — NURSING NOTE
Comprehensive Intake Entered On:  4/7/2020 1:19 PM CDT    Performed On:  4/7/2020 1:15 PM CDT by Rupa Cuellar LPN               Summary   Chief Complaint :   verbal consent given for telemed visit. Pain medication refilled, did take blood pressure this morning and was 119/78 pulse was 84   Height Measured :   70 in(Converted to: 5 ft 10 in, 177.80 cm)    Race :      Languages :   English   Ethnicity :   Not  or    Rupa Cuellar LPN - 4/7/2020 1:15 PM CDT   Health Status   Allergies Verified? :   Yes   Medication History Verified? :   Yes   Pre-Visit Planning Status :   Completed   Rupa Cuellar LPN - 4/7/2020 1:15 PM CDT   Consents   Consent for Immunization Exchange :   Consent Granted   Consent for Immunizations to Providers :   Consent Granted   Rupa Cuellar LPN - 4/7/2020 1:15 PM CDT   Meds / Allergies   (As Of: 4/7/2020 1:19:40 PM CDT)   Allergies (Active)   amitriptyline  Estimated Onset Date:   Unspecified ; Reactions:   Dizziness, Dry mouth ; Created By:   Aliya Marcano; Reaction Status:   Active ; Category:   Drug ; Substance:   amitriptyline ; Type:   Allergy ; Updated By:   Aliya Marcano; Source:   Paper Chart ; Reviewed Date:   2/6/2020 4:35 PM CST      beta blockers  Estimated Onset Date:   Unspecified ; Reactions:   Reactive airway disease ; Created By:   Alexander Higginbotham MD; Reaction Status:   Active ; Category:   Drug ; Substance:   beta blockers ; Type:   Side Effect ; Updated By:   Alexander Higginbotham MD; Reviewed Date:   2/6/2020 4:35 PM CST      Indocin  Estimated Onset Date:   <not entered> 4/1/1992 ; Reactions:   memory problems, Disorientation ; Created By:   Aliya Marcano; Reaction Status:   Active ; Category:   Drug ; Substance:   Indocin ; Type:   Allergy ; Updated By:   Aliya Marcano; Source:   Paper Chart ; Reviewed Date:   2/6/2020 4:35 PM CST      Paxil  Estimated Onset Date:   Unspecified ; Reactions:    Anxious, Trembles ; Created By:   Aliya Marcano; Reaction Status:   Active ; Category:   Drug ; Substance:   Paxil ; Type:   Allergy ; Updated By:   Aliya Marcano; Source:   Paper Chart ; Reviewed Date:   2/6/2020 4:35 PM CST        Medication List   (As Of: 4/7/2020 1:19:40 PM CDT)   Prescription/Discharge Order    acetaminophen-hydrocodone  :   acetaminophen-hydrocodone ; Status:   Prescribed ; Ordered As Mnemonic:   Norco 5 mg-325 mg oral tablet ; Simple Display Line:   1 tab(s), po, q4-6 hrs, Up to 5 tabs per day., 150 EA, 0 Refill(s) ; Ordering Provider:   Alexander Higginbotham MD; Catalog Code:   acetaminophen-hydrocodone ; Order Dt/Tm:   2/6/2020 4:41:46 PM CST          acetaminophen-hydrocodone  :   acetaminophen-hydrocodone ; Status:   Prescribed ; Ordered As Mnemonic:   Norco 5 mg-325 mg oral tablet ; Simple Display Line:   1 tab(s), po, q4-6 hrs, Up to 5 tabs per day. Fill 3/11/2020, 150 EA, 0 Refill(s) ; Ordering Provider:   Alexander Higginbotham MD; Catalog Code:   acetaminophen-hydrocodone ; Order Dt/Tm:   2/6/2020 4:41:51 PM CST          eletriptan  :   eletriptan ; Status:   Prescribed ; Ordered As Mnemonic:   eletriptan 40 mg oral tablet ; Simple Display Line:   40 mg, 1 tab(s), PO, Once, PRN: for migraine headache, 12 tab(s), 5 Refill(s) ; Ordering Provider:   Alexander Higginbotham MD; Catalog Code:   eletriptan ; Order Dt/Tm:   12/12/2019 4:42:29 PM CST          Miscellaneous Rx Supply  :   Miscellaneous Rx Supply ; Status:   Prescribed ; Ordered As Mnemonic:   Replacment CPAP +9 cm H2o ; Simple Display Line:   See Instructions, Heated humidifier, heated tubing, filters, nasal or full face mask of choice with headgear.  Replacement cushions and supplies as needed.  Change supplies every 6 mos  Months = 99 (Lifetime), 1 EA, 11 Refill(s) ; Ordering Provider:   Alexander Higginbotham MD; Catalog Code:   Miscellaneous Rx Supply ; Order Dt/Tm:   4/13/2017 3:34:47 PM CDT            Home Meds    ferrous sulfate   :   ferrous sulfate ; Status:   Documented ; Ordered As Mnemonic:   ferrous sulfate 325 mg (65 mg elemental iron) oral tablet ; Simple Display Line:   325 mg, 1 tab(s), po, daily, 0 Refill(s) ; Catalog Code:   ferrous sulfate ; Order Dt/Tm:   1/25/2018 4:43:44 PM CST          triamcinolone nasal  :   triamcinolone nasal ; Status:   Documented ; Ordered As Mnemonic:   Nasacort ; Catalog Code:   triamcinolone nasal ; Order Dt/Tm:   6/17/2014 3:37:34 PM CDT

## 2022-02-15 NOTE — TELEPHONE ENCOUNTER
---------------------  From: Lyric Pardo LPN (Phone Messages Pool ([id number]))   To: Phone Messages Pool ([id number]);     Sent: 2019 9:47:37 AM CDT  Subject: hydrocodone        Phone Message    PCP:    JENNIFER      Time of Call:  0835       Person Calling:  Patient   Phone number:  [phone] ok LM    Returned call at: 09    Note:   Patient is trying to get refill of hydrocodone.  He would like it sent to [facility] in [place]  Called patient to discuss he is due for a visit in May.  RTC is for May.  CSA- . CAlled and no vm.  Patient needs to set up visit.      Last office visit and reason: 2019 PainCalled and had to enter area code and phone number.  Unable to leave message.Pt LM at 8:21am. Pt requesting refill of hydrocodone as his Rx will be completely gone on . Pt wants call back once it has been called in.    Returned call and informed pt that we did receive his message on Monday and tried to call back but were unable to leave a message.     Told pt he is due for a visit for further refills. Pt says Fiona always told him he could call for his 3rd month. Pt says he cannot come in because he works. Pt says he tried to get an appt with [facility] this week and was told he has no appts this week or next week (multiple openings next week). Pt says he made an appt for  to follow up because that is the soonest [facility] had openings.    Pt says he works until 4:30 and it is ok to leave a message for him if able, otherwise call back early morning or after 4:30.  Pt wants 1 more month refill.---------------------  From: Amanda Gallegos LPN (Phone Messages Pool ([id number]))   To: [facility] Message Pool ([id number]);     Sent: 5/15/2019 9:03:46 AM CDT  Subject: FW: hydrocodone---------------------  From: Annita Lau CMA ([facility] Message Pool ([id number]_[place]))   To: Alexander Higginbotham MD;     Sent: 2019 8:40:15 AM CDT  Subject: FW: hydrocodone      Please  advise.Pt calls again at 9:24am stating that this is the fourth time he has requested refill this week but has been having trouble gettting it filled. He states he knows he needs appt but won't be able to get in right away but plans to make appt as soon as he can. There is an appt scheduled for 6/19 right now. He states he will be out tomorrow and needs to have it for Sat. He asks for a message to be left on his [phone]. He does note he's having problems with his VM so it we are unable to LM then he requests a call be made to his pharmacy with whatever info we are leaving because he does plan on calling them today and can get message from them.---------------------  From: Anahy MARCUS Alexander   To: MALATHI[facility]abb[facility] Message Pool ([id number]_[place]);     Sent: 5/16/2019 10:10:14 AM CDT  Subject: RE: hydrocodone      Refilled.[facility] for patient at 1019. Advised him that prescription was sent. Told patient to call back with any questions or concerns.

## 2022-02-15 NOTE — TELEPHONE ENCOUNTER
---------------------  From: Kelly Pittman (Phone Messages Pool ([address] [place] [address]))   To: [facility] [place] ([address] [place] [address]);     Sent: 8/4/2020 10:11:16 AM CDT  Subject: Medication refills     Phone Message    PCP: JENNIFER    Time of Call: 0957    Phone Number: [phone]    Note: Patient calling stating that he is needing refills of Hydrocodone Last filled 6/11/20 and Eletriptan Last filled 4/7/20. #12 R5. Patient stated they will be due for him to  8/9/20. Please advise.    Pharmacy: [facility]     Last office visit and reason: 7/22/20 Phone, covid sx, nausea     Transferred to: [facility] will send in 1 week of his vicodin, the rest can be filled by [facility]---------------------  From: Moises MONZON, Farhan LUKE (Advanced Practice Provider Pool ([address] [place] [address]))   To: Phone Messages Pool ([address] [place] [address]);     Sent: 8/4/2020 11:02:28 AM CDT  Subject: RE: Medication refills---------------------  From: Kelly Pittman (Phone Messages Pool ([address] [place] [address]))   To: Alexander Higginbotham MD;     Sent: 8/4/2020 12:56:57 PM CDT  Subject: FW: Medication refills     Patient notified. MALATHIDL, please advise on further Vicodin refills.---------------------  From: Alexander Higginbotham MD   To: Phone [employer] Pool ([address] [place] [address]);     Sent: 8/10/2020 11:45:39 AM CDT  Subject: RE: Medication refills     One month script sent in. Needs to be seen in clinic for next refill.Notified via voicemail. Instructed to call back to schedule appointment for further refills.

## 2022-02-15 NOTE — LETTER
(Inserted Image. Unable to display)   June 30, 2021  PARIS FARMER  [address] [place] [address]          Dear PARIS,      Thank you for selecting [facility] for your healthcare needs.    Our records indicate you are due for the following services:     Follow-up office visit     (FYI   Regarding office visits: In some instances, a video visit or telephone visit may be offered as an option.)        To schedule an appointment or if you have further questions, please contact your clinic at [phone].      Powered by [employer] and [employer]    Sincerely,    Alexander Higginbotham M.D., FACP

## 2022-02-15 NOTE — PROGRESS NOTES
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    Accompanied by:  No one.    Source of history:  Self.    History limitation:  None.       Chief Complaint   8/15/2019 3:48 PM CDT    Pain mgmt f/u.      History of Present Illness    The patient just had a birthday.  He is looking forward to going back to school where he is a .  He has gotten the school all cleaned up over the summer.  He is using his CPAP almost every night, he tells me.  The hydrocodone does allow him to keep up his active lifestyle and work.      Has the patients condition changed significantly since their last visit?     no  Has the patient been compliant with treatments, including non-opioid treatments?    yes  Has there been any aberrant opioid behavior since the last visit?     no  Is the total opioid dose less than 90 morphine equivalents?    yes  Is there reasonable progress toward meeting established functional goals?     remains active  Was the last drug test consistent with prescribed medications?     yes  Is another  drug test due at this visit?    no  Has the ePDMP been reviewed?     yes  Is the patient complying with their signed Controlled Substance Abuse Agreement?     yes  Based on all the above, is the patient still a candidate for chronic opioid therapy?       yes         Review of Systems   Constitutional:  No fatigue, No decreased activity, No weight loss.    Ear/Nose/Mouth/Throat:  allergy symptoms.    Respiratory:  No shortness of breath, No cough.    Cardiovascular:  No chest pain, No palpitations, No peripheral edema.    Gastrointestinal:  No nausea, No vomiting, No diarrhea, No constipation.    Musculoskeletal:  Negative except as documented in history of  present illness.    Neurologic:  Negative except as documented in history of present illness.       Health Status   Allergies:    Allergic Reactions (Selected)  Severity Not Documented  Amitriptyline (Dizziness and dry mouth)  Indocin (Memory problems and disorientation)  Paxil (Anxious and trembles)  Nonallergic Reactions (Selected)  Severity Not Documented  Beta blockers (Reactive airway disease)   Medications:  (Selected)   Prescriptions  Prescribed  Norco 5 mg-325 mg oral tablet: 1 tab(s), po, q4-6 hrs, Instructions: Up to 5 tabs per day. Fill 9/16/2019, # 150 EA, 0 Refill(s), Type: Maintenance, Pharmacy: [facility] #[id number], 1 tab(s) Oral q4-6 hrs,Instr:Up to 5 tabs per day. Fill 9/16/2019  Norco 5 mg-325 mg oral tablet: 1 tab(s), po, q4-6 hrs, Instructions: Up to 5 tabs per day., # 150 EA, 0 Refill(s), Type: Maintenance, Pharmacy: [facility] #[id number], 1 tab(s) Oral q4-6 hrs,Instr:Up to 5 tabs per day.  Replacment CPAP +9 cm H2o: Replacment CPAP +9 cm H2o, See Instructions, Instructions: Heated humidifier, heated tubing, filters, nasal or full face mask of choice with headgear.  Replacement cushions and supplies as needed.  Change supplies every 6 mos  Months = 99 (Lifetime),...  eletriptan 40 mg oral tablet: = 1 tab(s) ( 40 mg ), PO, Once, PRN: for migraine headache, # 12 tab(s), 5 Refill(s), Type: Soft Stop, Pharmacy: [facility] #[id number], 1 tab(s) Oral once,PRN:for migraine headache  Documented Medications  Documented  Nasacort: 0 Refill(s), Type: Maintenance  ferrous sulfate 325 mg (65 mg elemental iron) oral tablet: 1 tab(s) ( 325 mg ), po, daily, 0 Refill(s), Type: Maintenance,    Medications          *denotes recorded medication          Replacment CPAP +9 cm H2o: See Instructions, Heated humidifier, heated tubing, filters, nasal or full face mask of choice with headgear.  Replacement cushions and supplies as needed.  Change supplies every 6 mos  Months = 99 (Lifetime), 1 EA, 11  Refill(s).          Norco 5 mg-325 mg oral tablet: 1 tab(s), po, q4-6 hrs, Up to 5 tabs per day., 150 EA, 0 Refill(s).          Norco 5 mg-325 mg oral tablet: 1 tab(s), po, q4-6 hrs, Up to 5 tabs per day. Fill 9/16/2019, 150 EA, 0 Refill(s).          eletriptan 40 mg oral tablet: 40 mg, 1 tab(s), PO, Once, PRN: for migraine headache, 12 tab(s), 5 Refill(s).          *ferrous sulfate 325 mg (65 mg elemental iron) oral tablet: 325 mg, 1 tab(s), po, daily, 0 Refill(s).          *Nasacort: 0 Refill(s), Type: Maintenance.       Problem list:    All Problems  Acid reflux / SNOMED CT 967989395 / Confirmed  BPH associated with nocturia / SNOMED CT 4856031336 / Confirmed  Chronic headache / SNOMED CT 5998437331 / Confirmed  Chronic pain / SNOMED CT 992513909 / Confirmed  Common migraine / SNOMED CT 67265829 / Confirmed  Fibromyalgia / SNOMED CT 13296170 / Confirmed  Glucose intolerance (impaired glucose tolerance) / SNOMED CT 60885903 / Confirmed  H/O: iron deficiency anemia / SNOMED CT 484828722 / Confirmed  IBS (irritable bowel syndrome) / SNOMED CT 31513660 / Confirmed  NSAID-induced gastric ulcer / SNOMED CT 5330428264 / Confirmed  Periodic limb movement sleep disorder / SNOMED CT 6183592648 / Confirmed  Rhinitis, chronic / SNOMED CT 520775020 / Confirmed  Sleep apnea / SNOMED CT 865124282 / Confirmed  Use of opiates for therapeutic purposes / SNOMED CT 682863495 / Confirmed  Varicose veins / SNOMED CT 059835547 / Confirmed  Resolved: *Hospitalized@[facility] - Acute bronchitis  Resolved: *Hospitalized@[facility] - NSAID gastropathy  Resolved: Depression with anxiety / SNOMED CT 221012009  Canceled: Common Migraine / ICD-9-.10      Histories   Past Medical History:    Active  Sleep apnea (540661790): Onset on 11/22/2004 at 59 years.  Comments:  9/12/2014 CDT 5:28 PM CDT - Anahy MARCUS, Alexander [facility]2004: RDI 30.1 with oximetry james 63%. 4/1/2005 adequate CPAP titration to 8. Optimal CPAP titration to 9 at [facility] 10/30/2009.  Chronic headache (7350075452)  Acid reflux (859889946)  IBS (irritable bowel syndrome) (60354158)  Fibromyalgia (45514868)  Periodic limb movement sleep disorder (5437693920)  Common migraine (31563815)  NSAID-induced gastric ulcer (4236764779)  H/O: iron deficiency anemia (087374537)  Rhinitis, chronic (204023349)  Resolved  *Hospitalized@[facility] - Acute bronchitis: Onset on 3/7/2012 at 66 years.  Resolved.  *Hospitalized@[facility] - NSAID gastropathy: Onset on 7/25/2011 at 65 years.  Resolved.  Depression with anxiety (097998866):  Resolved.   Family History:    Colitis  Sister  [place] - Cancer  Mother: onset at 60 .     Procedure history:    Capsule endoscopy (4938687029) on 12/30/2009 at 64 Years.  Comments:  11/23/2010 3:28 PM Alexander Lewis MD  negative  Colonoscopy (937089496) on 12/3/2009 at 64 Years.  Comments:  9/12/2014 5:22 PM Alexander Gtz MD  Normal    11/23/2010 3:28 PM Alexander Lewis MD  negative  Upper gastrointestinal endoscopy (783549234) on 7/17/2009 at 63 Years.  Comments:  11/23/2010 3:30 PM Alexander Lewis MD  gastritis  Esophagogastroduodenoscopy (000709840) on 5/28/2004 at 58 Years.  Comments:  9/12/2014 5:31 PM Alexander Gtz MD  Mild gastritis and incomplete Schatzki's ring  Colonoscopy (438373454) on 5/28/2004 at 58 Years.  Comments:  9/12/2014 5:29 PM Alexander Gtz MD  Normal  Cholecystectomy (58478586) in 2002 at 57 Years.  Spinal fusion (60925438) in 1995 at 50 Years.  Esophagogastroduodenoscopy (2957916527) in 1989 at 44 Years.  Endoscopy with biopsy in the month of 7/1989 at 43 Years.  Right knee arthroscopy.   Social History:        Alcohol Assessment            Current, 1 drinks/episode average.      Tobacco Assessment            Never      Substance Abuse Assessment: Denies Substance Abuse      Employment and Education Assessment            Employed                     Comments:                      12/06/2012 - Anahy MARCUS,  Alexander                     [employer]      Home and Environment Assessment            Marital status:  (Living together).      Exercise and Physical Activity Assessment            Exercise frequency: Exercises but did not indicate how often..        Physical Examination   Vital Signs   8/15/2019 3:48 PM CDT Temperature Tympanic 97.3 DegF  LOW    Peripheral Pulse Rate 68 bpm    Pulse Site Radial artery    HR Method Manual    Systolic Blood Pressure 120 mmHg    Diastolic Blood Pressure 70 mmHg    Mean Arterial Pressure 87 mmHg    BP Site Right arm    BP Method Manual      Measurements from flowsheet : Measurements   8/15/2019 3:48 PM CDT    Weight Measured - Standard                154 lb     General:  Alert and oriented, No acute distress.    Eye:  Normal conjunctiva.    HENT:  Normocephalic, Normal hearing.    Musculoskeletal:  Normal gait.    Integumentary:  No pallor.    Neurologic:  No focal deficits.    Cognition and Speech:  Speech clear and coherent, Functional cognition intact.    Psychiatric:  Cooperative, Appropriate mood & affect.       Impression and Plan   Diagnosis     Sleep apnea (JZX11-YE G47.30).     Fibromyalgia (EAZ66-RH M79.7).     Chronic pain (JIA03-CC G89.29).     Chronic headache (DHC01-ZN R51).     Course:  Good response to treatment.    Patient Instructions:       Counseled: Patient, Diet, Activity, Verbalized understanding, Reviewed the importance of CPAP adherence to reduce daytime sleepiness and prevent excess mortality associated with sleep apnea .    Orders     Orders (Selected)   Outpatient Orders  Ordered  Return to Clinic (Request): RFV: Wellness, Return in 2 months, Instructions: lab before visit  Return to Office (Request): RFV: Annual cbc, chem 14, psa, tsh, ferritin. lipids, Return in Annually in June  Prescriptions  Prescribed  Norco 5 mg-325 mg oral tablet: 1 tab(s), po, q4-6 hrs, Instructions: Up to 5 tabs per day. Fill 9/16/2019, # 150 EA, 0 Refill(s), Type:  Maintenance, Pharmacy: [facility] #[id number], 1 tab(s) Oral q4-6 hrs,Instr:Up to 5 tabs per day. Fill 9/16/2019  Norco 5 mg-325 mg oral tablet: 1 tab(s), po, q4-6 hrs, Instructions: Up to 5 tabs per day., # 150 EA, 0 Refill(s), Type: Maintenance, Pharmacy: [facility] #[id number], 1 tab(s) Oral q4-6 hrs,Instr:Up to 5 tabs per day.  eletriptan 40 mg oral tablet: = 1 tab(s) ( 40 mg ), PO, Once, PRN: for migraine headache, # 12 tab(s), 5 Refill(s), Type: Soft Stop, Pharmacy: [facility] #[id number], 1 tab(s) Oral once,PRN:for migraine headache.

## 2022-02-15 NOTE — PROGRESS NOTES
Patient:   PARIS FARMER            MRN: [id number]            FIN: [id number]               Age:   74 years     Sex:  Male     :  1945   Associated Diagnoses:   Sleep apnea; Fibromyalgia; Chronic pain; Chronic headache; Common migraine   Author:   Alexander Higginbotham MD      Visit Information   Visit type:  Scheduled follow-up.    Accompanied by:  No one.    Source of history:  Self.    History limitation:  None.       Chief Complaint   2020 1:15 PM CDT     verbal consent given for telemed visit. Pain medication refilled, did take blood pressure this morning and was 119/78 pulse was 84      History of Present Illness     Today's visit was conducted via telephone due to the COVID-19 pandemic. Patient's consent to telephone visit was obtained and documented.  Call Start Time:   1440  Call End Time:   1455    Pain controlled. Out of work due to COVID19. Pain controlled. Bought a  for CPAP and using it more. Naps but otherwise not sleepy.     Has the patients condition changed significantly since their last visit?     no  Has the patient been compliant with treatments, including non-opioid treatments?    yes  Has there been any aberrant opioid behavior since the last visit?     no  Is the total opioid dose less than 90 morphine equivalents?    yes  Is there reasonable progress toward meeting established functional goals?     remains active  Was the last drug test consistent with prescribed medications?     yes  Is another  drug test due at this visit?    no  Has the ePDMP been reviewed?     yes  Is the patient complying with their signed Controlled Substance Abuse Agreement?     yes  Based on all the above, is the patient still a candidate for chronic opioid therapy?       yes         Review of Systems   Constitutional:  No fever, No chills, No fatigue, No decreased activity, No weight loss.    Ear/Nose/Mouth/Throat:  Negative except as documented in history of present illness.    Respiratory:  Negative  except as documented in history of present illness.    Cardiovascular:  No chest pain, No palpitations, No peripheral edema.    Gastrointestinal:  No nausea, No vomiting, No diarrhea, No constipation.    Musculoskeletal:  Negative except as documented in history of present illness.    Neurologic:  Negative except as documented in history of present illness.       Health Status   Allergies:    Allergic Reactions (Selected)  Severity Not Documented  Amitriptyline (Dizziness and dry mouth)  Indocin (Memory problems and disorientation)  Paxil (Anxious and trembles)  Nonallergic Reactions (Selected)  Severity Not Documented  Beta blockers (Reactive airway disease)   Medications:  (Selected)   Prescriptions  Prescribed  Norco 5 mg-325 mg oral tablet: 1 tab(s), po, q4-6 hrs, Instructions: Up to 5 tabs per day. Fill 5/11/2020, # 150 EA, 0 Refill(s), Type: Maintenance, Pharmacy: [facility] #[id number], 1 tab(s) Oral q4-6 hrs,Instr:Up to 5 tabs per day. Fill 5/11/2020  Norco 5 mg-325 mg oral tablet: 1 tab(s), po, q4-6 hrs, Instructions: Up to 5 tabs per day., # 150 EA, 0 Refill(s), Type: Maintenance, Pharmacy: [facility] #[id number], 1 tab(s) Oral q4-6 hrs,Instr:Up to 5 tabs per day.  Replacment CPAP +9 cm H2o: Replacment CPAP +9 cm H2o, See Instructions, Instructions: Heated humidifier, heated tubing, filters, nasal or full face mask of choice with headgear.  Replacement cushions and supplies as needed.  Change supplies every 6 mos  Months = 99 (Lifetime),...  eletriptan 40 mg oral tablet: = 1 tab(s) ( 40 mg ), PO, Once, PRN: for migraine headache, # 12 tab(s), 5 Refill(s), Type: Soft Stop, Pharmacy: [facility] #[id number], 1 tab(s) Oral once,PRN:for migraine headache  Documented Medications  Documented  Nasacort: 0 Refill(s), Type: Maintenance  ferrous sulfate 325 mg (65 mg elemental iron) oral tablet: 1 tab(s) ( 325 mg ), po, daily, 0 Refill(s), Type: Maintenance,    Medications          *denotes recorded  medication          Replacment CPAP +9 cm H2o: See Instructions, Heated humidifier, heated tubing, filters, nasal or full face mask of choice with headgear.  Replacement cushions and supplies as needed.  Change supplies every 6 mos  Months = 99 (Lifetime), 1 EA, 11 Refill(s).          Norco 5 mg-325 mg oral tablet: 1 tab(s), po, q4-6 hrs, Up to 5 tabs per day., 150 EA, 0 Refill(s).          Norco 5 mg-325 mg oral tablet: 1 tab(s), po, q4-6 hrs, Up to 5 tabs per day. Fill 5/11/2020, 150 EA, 0 Refill(s).          eletriptan 40 mg oral tablet: 40 mg, 1 tab(s), PO, Once, PRN: for migraine headache, 12 tab(s), 5 Refill(s).          *ferrous sulfate 325 mg (65 mg elemental iron) oral tablet: 325 mg, 1 tab(s), po, daily, 0 Refill(s).          *Nasacort: 0 Refill(s), Type: Maintenance.       Problem list:    All Problems  Varicose veins / SNOMED CT 753992185 / Confirmed  Sleep apnea / SNOMED CT 842287023 / Confirmed  Periodic limb movement sleep disorder / SNOMED CT 9249813009 / Confirmed  Common migraine / SNOMED CT 93862044 / Confirmed  IBS (irritable bowel syndrome) / SNOMED CT 65764281 / Confirmed  Glucose intolerance (impaired glucose tolerance) / SNOMED CT 09706472 / Confirmed  Use of opiates for therapeutic purposes / SNOMED CT 420509738 / Confirmed  H/O: iron deficiency anemia / SNOMED CT 178493521 / Confirmed  Acid reflux / SNOMED CT 705912807 / Confirmed  NSAID-induced gastric ulcer / SNOMED CT 3397071543 / Confirmed  Fibromyalgia / SNOMED CT 75178148 / Confirmed  Rhinitis, chronic / SNOMED CT 603162358 / Confirmed  Chronic pain / SNOMED CT 533009831 / Confirmed  Chronic headache / SNOMED CT 1876330282 / Confirmed  BPH associated with nocturia / SNOMED CT 7626982130 / Confirmed  Resolved: Depression with anxiety / SNOMED CT 060777977  Resolved: *Hospitalized@[facility] - NSAID gastropathy  Resolved: *Hospitalized@[facility] - Acute bronchitis  Canceled: Common Migraine / ICD-9-.10      Histories   Past Medical History:     Active  Sleep apnea (552143831): Onset on 11/22/2004 at 59 years.  Comments:  9/12/2014 CDT 5:28 PM Alexander Gtz MD  [facility]: RDI 30.1 with oximetry james 63%. 4/1/2005 adequate CPAP titration to 8. Optimal CPAP titration to 9 at [facility] 10/30/2009.  Chronic headache (6148560426)  Acid reflux (043947530)  IBS (irritable bowel syndrome) (31827687)  Fibromyalgia (35278391)  Periodic limb movement sleep disorder (0003660321)  Common migraine (59334621)  NSAID-induced gastric ulcer (4516186957)  H/O: iron deficiency anemia (874668739)  Rhinitis, chronic (911104036)  Resolved  *Hospitalized@[facility] - Acute bronchitis: Onset on 3/7/2012 at 66 years.  Resolved.  *Hospitalized@[facility] - NSAID gastropathy: Onset on 7/25/2011 at 65 years.  Resolved.  Depression with anxiety (364215666):  Resolved.   Family History:    Colitis  Sister  [place] - Cancer  Mother: onset at 60 .     Procedure history:    Capsule endoscopy (1966045054) on 12/30/2009 at 64 Years.  Comments:  11/23/2010 3:28 PM Alexander Lewis MD  negative  Colonoscopy (397649740) on 12/3/2009 at 64 Years.  Comments:  9/12/2014 5:22 PM Alexander Gtz MD  Normal    11/23/2010 3:28 PM Alexander Lewis MD  negative  Upper gastrointestinal endoscopy (697953064) on 7/17/2009 at 63 Years.  Comments:  11/23/2010 3:30 PM Alexander Lewis MD  gastritis  Esophagogastroduodenoscopy ([id number]) on 5/28/2004 at 58 Years.  Comments:  9/12/2014 5:31 PM Alexander Gtz MD  Mild gastritis and incomplete Schatzki's ring  Colonoscopy ([id number]) on 5/28/2004 at 58 Years.  Comments:  9/12/2014 5:29 PM Alexander Gtz MD  Normal  Cholecystectomy (79583380) in 2002 at 57 Years.  Spinal fusion (29010191) in 1995 at 50 Years.  Esophagogastroduodenoscopy (6629682386) in 1989 at 44 Years.  Endoscopy with biopsy in the month of 7/1989 at 43 Years.  Right knee arthroscopy.   Social History:        Alcohol Assessment             Current, 1 drinks/episode average.      Tobacco Assessment            Never      Substance Abuse Assessment: Denies Substance Abuse      Employment and Education Assessment            Employed                     Comments:                      12/06/2012 - Anahy MARCUS, Alexander                     [employer]      Home and Environment Assessment            Marital status:  (Living together).      Exercise and Physical Activity Assessment            Exercise frequency: Exercises but did not indicate how often..        Impression and Plan   Diagnosis     Sleep apnea (NFF36-DR G47.30).     Fibromyalgia (CLE83-VO M79.7).     Chronic pain (BVT98-EY G89.29).     Chronic headache (KTK29-JO R51).     Common migraine (NDQ31-SL G43.009).     Course:  Good response to treatment.    Patient Instructions:       Counseled: Patient, Diet, Activity, Verbalized understanding, Reviewed the importance of CPAP adherence to reduce daytime sleepiness and prevent excess mortality associated with sleep apnea .    Orders     Orders (Selected)   Outpatient Orders  Ordered  Return to Office (Request): RFV: Annual cbc, chem 14, psa, tsh, ferritin. lipids, Return in Annually in June  Order  Return to Clinic (Request): RFV: Wellness, Return in 2 months, Instructions: lab before visit  Prescriptions  Prescribed  Norco 5 mg-325 mg oral tablet: 1 tab(s), po, q4-6 hrs, Instructions: Up to 5 tabs per day. Fill 5/11/2020, # 150 EA, 0 Refill(s), Type: Maintenance, Pharmacy: [facility] #[id number], 1 tab(s) Oral q4-6 hrs,Instr:Up to 5 tabs per day. Fill 5/11/2020  Norco 5 mg-325 mg oral tablet: 1 tab(s), po, q4-6 hrs, Instructions: Up to 5 tabs per day., # 150 EA, 0 Refill(s), Type: Maintenance, Pharmacy: [facility] #[id number], 1 tab(s) Oral q4-6 hrs,Instr:Up to 5 tabs per day.  eletriptan 40 mg oral tablet: = 1 tab(s) ( 40 mg ), PO, Once, PRN: for migraine headache, # 12 tab(s), 5 Refill(s), Type: Soft Stop, Pharmacy: [facility]  #[id number], 1 tab(s) Oral once,PRN:for migraine headache.

## 2022-02-15 NOTE — PROGRESS NOTES
Patient:   PARIS FARMER            MRN: [id number]            FIN: [id number]               Age:   72 years     Sex:  Male     :  1945   Associated Diagnoses:   Cough   Author:   Mathew Reis MD      Chief Complaint   2017 1:29 PM CST    c/o cough in afternoon and night. having problems sleeping x 2 weeks      History of Present Illness             The patient presents with cough.  The cough is described as productive.  The severity of the cough is moderate.  The cough has lasted for 2 week(s).  Associated symptoms consist of chills, headache, nasal congestion and denies fever.  Not sleeping very well at night. He uses nasocort at night to help him wear his mask at night to use CPAP. He hasn't been able to use is CPAP all night because of nasal congestion..        Review of Systems   Constitutional:  Chills, No fever.    Ear/Nose/Mouth/Throat:  Nasal congestion.    Respiratory:  Cough.    Integumentary:  Negative.    Neurologic:  Negative.       Health Status   Allergies:    Allergic Reactions (Selected)  Severity Not Documented  Amitriptyline (Dizziness and dry mouth)  Indocin (Memory problems and disorientation)  Paxil (Anxious and trembles)  Nonallergic Reactions (Selected)  Severity Not Documented  Beta blockers (Reactive airway disease)   Medications:  (Selected)   Prescriptions  Prescribed  Norco 5 mg-325 mg oral tablet: 1 tab(s), po, q4-6 hrs, Instructions: Up to 5 daily, # 150 EA, 0 Refill(s), Type: Maintenance, Pharmacy: [facility] #[id number], Fill on 17, 1 tab(s) po q4-6 hrs,Instr:Up to 5 daily  Norco 5 mg-325 mg oral tablet: 1 tab(s), po, q4-6 hrs, Instructions: Up to 5 dayl.  Fill on on 17, # 150 EA, 0 Refill(s), Type: Maintenance, Pharmacy: [facility] #[id number], 1 tab(s) po q4-6 hrs,Instr:Up to 5 dayl. Fill on on 17  Relpax 40 mg oral tablet: See Instructions, Instructions: Take 1 tab by mouth once as needed for migraine headache may repeat dose once in 2 hrs, # 12  tab(s), 5 Refill(s), Type: Soft Stop, Pharmacy: [facility] #[id number], Take 1 tab by mouth once as needed for migraine headach...  Replacment CPAP +9 cm H2o: Replacment CPAP +9 cm H2o, See Instructions, Instructions: Heated humidifier, heated tubing, filters, nasal or full face mask of choice with headgear.  Replacement cushions and supplies as needed.  Change supplies every 6 mos  Months = 99 (Lifetime),...  Slow Fe 160 mg oral tablet, extended release: 1 tab(s) ( 160 mg ), po, bid, # 30 tab(s), 0 Refill(s), Type: Maintenance, patient has supply at home (Rx)  Documented Medications  Documented  Nasacort: 0 Refill(s), Type: Maintenance   Problem list:    All Problems  Varicose veins / SNOMED CT 952244144 / Confirmed  Sleep apnea / ICD-9-.09 / Confirmed  Rhinitis, chronic / ICD-9-.0 / Confirmed  Periodic Limb Movement Sleep Disorder / ICD-9-.51 / Confirmed  NSAID-Induced Gastric Ulcer / ICD-9-.90 / Confirmed  Glucose intolerance (impaired glucose tolerance) / SNOMED CT 78086484 / Confirmed  IBS (Irritable Bowel Syndrome) / ICD-9-.1 / Confirmed  H/O: Iron Deficiency Anemia / ICD-9-CM V12.3 / Confirmed  Fibromyalgia / SNOMED CT 44449283 / Confirmed  Common Migraine / ICD-9-.10 / Confirmed  Chronic Headache / ICD-9-.0 / Confirmed  Benign prostatic hypertrophy (BPH) with nocturia / SNOMED CT 0476141040 / Confirmed  Acid Reflux / ICD-9-.81 / Confirmed  Resolved: Depression with anxiety / ICD-9-.4  Resolved: *Hospitalized@[facility] - NSAID gastropathy  Resolved: *Hospitalized@[facility] - Acute bronchitis  Canceled: Common Migraine / ICD-9-.10      Histories   Past Medical History:    Active  Sleep apnea (786.09): Onset on 11/22/2004 at 59 years.  Comments:  9/12/2014 CDT 5:28 PM CDT - [facility] RDI 30.1 with oximetry james 63%. 4/1/2005 adequate CPAP titration to 8. Optimal CPAP titration to 9 at [facility] 10/30/2009.  Chronic  Headache (784.0)  Acid Reflux (530.81)  IBS (Irritable Bowel Syndrome) (564.1)  Fibromyalgia (57542708)  Periodic Limb Movement Sleep Disorder (327.51)  Common Migraine (346.10)  H/O: Iron Deficiency Anemia (V12.3)  Resolved  *Hospitalized@[facility] - Acute bronchitis: Onset on 3/7/2012 at 66 years.  Resolved.  *Hospitalized@[facility] - NSAID gastropathy: Onset on 7/25/2011 at 65 years.  Resolved.  Depression with anxiety (300.4):  Resolved.   Family History:    Colitis  Sister  [place] - Cancer  Mother     Procedure history:    Capsule endoscopy ([id number]) on 12/30/2009 at 64 Years.  Comments:  11/23/2010 3:28 PM - Alexander Higginbotham MD  negative  Colonoscopy (591151086) on 12/3/2009 at 64 Years.  Comments:  9/12/2014 5:22 PM - Alexander Higginbotham MD  Normal    11/23/2010 3:28 PM - Alexander Higginbohtam MD  negative  Upper gastrointestinal endoscopy (951257734) on 7/17/2009 at 63 Years.  Comments:  11/23/2010 3:30 PM - Alexander Higginbotham MD  gastritis  Esophagogastroduodenoscopy ([id number]) on 5/28/2004 at 58 Years.  Comments:  9/12/2014 5:31 PM - Alexander Higginbotham MD  Mild gastritis and incomplete Schatzki's ring  Colonoscopy ([id number]) on 5/28/2004 at 58 Years.  Comments:  9/12/2014 5:29 PM - Alexander Higginbotham MD  Normal  Cholecystectomy (58088355) in 2002 at 57 Years.  Spinal fusion (08935186) in 1995 at 50 Years.  Esophagogastroduodenoscopy (5023260762) in 1989 at 44 Years.  Endoscopy with biopsy in the month of 7/1989 at 43 Years.  Right knee arthroscopy.   Social History:        Alcohol Assessment: Current      Tobacco Assessment            Never      Employment and Education Assessment            Employed                     Comments:                      12/06/2012 - Alexander Higginbotham MD                     group home      Home and Environment Assessment            Marital status:  (Living together).        Physical Examination   Vital Signs   11/7/2017 1:29 PM CST Temperature Tympanic 97.0 DegF  LOW    Peripheral Pulse Rate 74 bpm     Systolic Blood Pressure 122 mmHg    Diastolic Blood Pressure 72 mmHg    Mean Arterial Pressure 89 mmHg      Measurements from flowsheet : Measurements   11/7/2017 1:29 PM CST Height Measured - Standard 70 in    Weight Measured - Standard 162.4 lb    BSA 1.91 m2    Body Mass Index 23.3 kg/m2      General:  Alert and oriented, No acute distress.    Eye:  Pupils are equal, round and reactive to light, Normal conjunctiva.    HENT:  Oral mucosa is moist.    Neck:  Supple.    Respiratory:  Cough.         Respirations: Are within normal limits.    Cardiovascular:  Normal rate, Regular rhythm, No edema.    Gastrointestinal:  Non-distended.    Musculoskeletal:  Normal gait.    Integumentary:  Warm, No rash.    Psychiatric:  Cooperative, Appropriate mood & affect, Normal judgment.       Impression and Plan   Diagnosis     Cough (QCL52-HP R05).     Plan:  Will treat with zpak.   Discussed using afrin to help on an emergency basis for nasal congestion.  He can use this with his nasocort. He can only take it for 5 days at a time. And then at least 2 weeks of no use   Sleeping upright might help. Reviewed expected course, what to watch for, and when to return. .    I, Tori Atkins [facility], acted solely as a scribe for, and in the presence of Dr. Mathew Reis who performed the service.

## 2022-02-15 NOTE — LETTER
(Inserted Image. Unable to display)   June 20, 2019      PARIS FARMER      [address], [place] [id number]        Dear PARIS,    Thank you for selecting [facility] ([facility]viously [facility]) for your healthcare needs.  Below you will find the results of your recent tests done at our clinic.     Consistent with Hydrocodone use.      Result Name Current Result Reference Range   U pH  5.2 6/18/2019 4.5 - 9.0   U Creatinine (mg/dL)  180.5 6/18/2019 > or = 20.0 -    U Amphetamines (ng/mL)  NEGATIVE 6/18/2019  - <500   U Amphetamines medMATCH  CONSISTENT 6/18/2019    U Barbiturate Scrn (ng/mL)  NEGATIVE 6/18/2019  - <300   U Cari medMATCH  CONSISTENT 6/18/2019    U Benzodia Scrn (ng/mL)  NEGATIVE 6/18/2019  - <100   U Benzodia medMATCH  CONSISTENT 6/18/2019    U Cannabis Metabolite medMATCH  CONSISTENT 6/18/2019    U Cannabis Metabolite Scrn (ng/mL)  NEGATIVE 6/18/2019  - <20   U Cocaine Metabolite Scrn (ng/mL)  NEGATIVE 6/18/2019  - <150   U Cocaine Metabolite medMATCH  CONSISTENT 6/18/2019    U Codeine Scrn (ng/mL)  NEGATIVE 6/18/2019  - <50   U Codeine medMATCH  CONSISTENT 6/18/2019    U Hydrocodone medMATCH ((A)) INCONSISTENT 6/18/2019    U Hydrocodone Scrn (ng/mL) ((H)) 1,886 6/18/2019  - <50   U Hydromorphone medMATCH ((A)) INCONSISTENT 6/18/2019    U Hydromorphone Scrn (ng/mL) ((H)) 831 6/18/2019  - <50   U medMATCH Comments  See comment 6/18/2019    U Methadone Scrn (ng/mL)  NEGATIVE 6/18/2019  - <100   U Methadone Scrn medMATCH  CONSISTENT 6/18/2019    U Morphine medMATCH  CONSISTENT 6/18/2019    U Morphine Scrn (ng/mL)  NEGATIVE 6/18/2019  - <50   U Norhydrocodone (ng/mL) ((H)) 3,555 6/18/2019  - <50   U Norhydrocodone medMatch ((A)) See Note 6/18/2019    U Opiates Scrn (ng/mL) ((A)) POSITIVE 6/18/2019  - <100   U Oxidants (mcg/mL)  NEGATIVE 6/18/2019  - <200   U Oxycodone Scrn (ng/mL)  NEGATIVE 6/18/2019  - <100   U Oxycodone Scrn medMATCH  CONSISTENT 6/18/2019    U PCP Scrn  (ng/mL)  NEGATIVE 6/18/2019  - <25   U PCP Scrn medMATCH  CONSISTENT 6/18/2019        Please contact me or my assistant at [phone] if you have any questions.     Sincerely,        Alexander Higginbotham MD

## 2022-02-15 NOTE — LETTER
(Inserted Image. Unable to display)   December 07, 2021  PARIS FARMER  [address] [place] [address]        Dear PARIS,    Thank you for selecting [facility] for your healthcare needs.    Our records indicate you are due for the following services:     Annual Wellness Visit  Fasting Lab Tests ~ Please do not eat or drink anything 10 hours prior to your scheduled appointment time.  (Water and any medications that you may need are allowed unless directed otherwise.)     If you had your labs done at another facility or with Direct Access Lab Testing at [facility], please bring in a copy of the results to your next visit, mail a copy, or drop off a copy of your results to your Healthcare Provider.     (FYI   Regarding office visits: In some instances, a video visit or telephone visit may be offered as an option.)    To schedule an appointment or if you have further questions, please contact your clinic at [phone].    Powered by [employer] and [employer]    Sincerely,    Alexander Higginbotham MD, FACP

## 2022-02-15 NOTE — LETTER
(Inserted Image. Unable to display)   December 07, 2020      PARIS FARMER      [address], [place] [id number]        Dear PARIS,    Thank you for selecting [facility] ([facility] for your healthcare needs.  Below you will find the results of your recent tests done at our clinic.     No evidence of diabetes or Celiac Disease.      Result Name Current Result Previous Result Reference Range   Glucose Level (mg/dL) ((H)) 108 12/4/2020 ((H)) 131 6/11/2020 65 - 99   Hgb A1c  5.5 12/4/2020   - <5.7   Immunoglob IgA (mg/dL)  249 12/4/2020  70 - 320   Celiac Disease Interp  See comment 12/4/2020     Tissue Transglutaminase IgA (unit/mL)  1 12/4/2020         Please contact me or my assistant at [phone] if you have any questions.     Sincerely,        Alexander Higginbotham MD

## 2022-02-15 NOTE — PROGRESS NOTES
Patient:   PARIS FARMER            MRN: [id number]            FIN: [id number]               Age:   73 years     Sex:  Male     :  1945   Associated Diagnoses:   Chronic headache; Chronic pain; Common migraine; Fibromyalgia; H/O: iron deficiency anemia; Periodic limb movement sleep disorder; Sleep apnea   Author:   Alexander Higginbotham MD      Visit Information   Visit type:  Scheduled follow-up.    Accompanied by:  No one.    Source of history:  Self.    History limitation:  None.       Chief Complaint   2018 8:37 AM CDT    med refills      History of Present Illness    The patient s headaches have been much better.  He has been working on using his CPAP as much as possible and with it he is able to sleep 3-5 hours and gets up only once or twice at night but when he does not use it he has nocturia every two or three hours.  He generally takes a nap when returning from work in the late afternoon for a couple of hours, uses his CPAP and is well rested.  His pain control is good.  He is looking forward to the start of the school year; he works at the elementary school as .   Has the patients condition changed significantly since their last visit?     no  Has the patient been compliant with treatments, including non-opioid treatments?    yes  Has there been any aberrant opioid behavior since the last visit?     no  Is the total opioid dose less than 90 morphine equivalents?    yes  Is there reasonable progress toward meeting established functional goals?     remains active  Was the last drug test consistent with prescribed medications?     yes  Is another  drug test due at this visit?     no  Has the ePDMP been reviewed?     yes  Is the patient complying with their signed Controlled Substance Abuse Agreement?     yes  Based on all the above, is the patient still a candidate for chronic opioid therapy?       yes         Review of Systems   Constitutional:  No fatigue, No decreased activity, No weight loss.     Ear/Nose/Mouth/Throat:  allergy symptoms.    Respiratory:  No shortness of breath, No cough.    Cardiovascular:  No chest pain, No palpitations, No peripheral edema.    Gastrointestinal:  No nausea, No vomiting, No diarrhea, No constipation.    Musculoskeletal:  Negative except as documented in history of present illness.    Neurologic:  Negative except as documented in history of present illness.       Health Status   Allergies:    Allergic Reactions (Selected)  Severity Not Documented  Amitriptyline (Dizziness and dry mouth)  Indocin (Memory problems and disorientation)  Paxil (Anxious and trembles)  Nonallergic Reactions (Selected)  Severity Not Documented  Beta blockers (Reactive airway disease)   Medications:  (Selected)   Prescriptions  Prescribed  Norco 5 mg-325 mg oral tablet: 1 tab(s), po, q4-6 hrs, Instructions: Up to 5 daily., # 150 EA, 0 Refill(s), Type: Maintenance, Pharmacy: [facility] #[id number], 1 tab(s) Oral q4-6 hrs,Instr:Up to 5 daily.  Norco 5 mg-325 mg oral tablet: 1 tab(s), po, q4-6 hrs, Instructions: Up to 5 day. Fill 9/20/18, # 150 EA, 0 Refill(s), Type: Maintenance, Pharmacy: [facility] #[id number], 1 tab(s) Oral q4-6 hrs,Instr:Up to 5 day. Fill 9/20/18  Relpax 40 mg oral tablet: See Instructions, Instructions: Take 1 tab by mouth once as needed for migraine headache may repeat dose once in 2 hrs, # 12 tab(s), 5 Refill(s), Type: Soft Stop, Pharmacy: [facility] #[id number], Take 1 tab by mouth once as needed for migraine headach...  Replacment CPAP +9 cm H2o: Replacment CPAP +9 cm H2o, See Instructions, Instructions: Heated humidifier, heated tubing, filters, nasal or full face mask of choice with headgear.  Replacement cushions and supplies as needed.  Change supplies every 6 mos  Months = 99 (Lifetime),...  Documented Medications  Documented  Nasacort: 0 Refill(s), Type: Maintenance  ferrous sulfate 325 mg (65 mg elemental iron) oral tablet: 1 tab(s) ( 325 mg ), po, daily, 0 Refill(s),  Type: Maintenance   Problem list:    All Problems  Acid reflux / SNOMED CT 707958237 / Confirmed  BPH associated with nocturia / SNOMED CT 8497555204 / Confirmed  Chronic headache / SNOMED CT 4916882343 / Confirmed  Chronic pain / SNOMED CT 163574555 / Confirmed  Common migraine / SNOMED CT 89475157 / Confirmed  Fibromyalgia / SNOMED CT 14787756 / Confirmed  Glucose intolerance (impaired glucose tolerance) / SNOMED CT 57243223 / Confirmed  H/O: iron deficiency anemia / SNOMED CT 826890880 / Confirmed  IBS (irritable bowel syndrome) / SNOMED CT 29980407 / Confirmed  NSAID-induced gastric ulcer / SNOMED CT 0958081949 / Confirmed  Periodic limb movement sleep disorder / SNOMED CT 7777939610 / Confirmed  Rhinitis, chronic / SNOMED CT 006557263 / Confirmed  Sleep apnea / SNOMED CT 189221067 / Confirmed  Varicose veins / SNOMED CT 519070858 / Confirmed  Resolved: *Hospitalized@[facility] Acute bronchitis  Resolved: *Hospitalized@[facility] NSAID gastropathy  Resolved: Depression with anxiety / SNOMED CT 570015273  Canceled: Common Migraine / ICD-9-.10      Histories   Past Medical History:    Active  Sleep apnea (621377973): Onset on 11/22/2004 at 59 years.  Comments:  9/12/2014 CDT 5:28 PM CDT - Alexander Higginbotham MD  [facility] RDI 30.1 with oximetry james 63%. 4/1/2005 adequate CPAP titration to 8. Optimal CPAP titration to 9 at [facility] 10/30/2009.  Chronic headache (5103978583)  Acid reflux (220620918)  IBS (irritable bowel syndrome) (93805892)  Fibromyalgia (70710080)  Periodic limb movement sleep disorder (0786380122)  Common migraine (78997819)  NSAID-induced gastric ulcer (4939184314)  H/O: iron deficiency anemia (724602125)  Rhinitis, chronic (182676406)  Resolved  *Hospitalized@[facility] Acute bronchitis: Onset on 3/7/2012 at 66 years.  Resolved.  *Hospitalized@[facility] NSAID gastropathy: Onset on 7/25/2011 at 65 years.  Resolved.  Depression with anxiety (969145151):  Resolved.   Family History:     Colitis  Sister  [place] - Cancer  Mother: onset at 60 .     Procedure history:    Capsule endoscopy (9506271976) on 12/30/2009 at 64 Years.  Comments:  11/23/2010 3:28 PM - Alexander Higginbotham MD  negative  Colonoscopy (072545789) on 12/3/2009 at 64 Years.  Comments:  9/12/2014 5:22 PM - Alexander Higginbotham MD  Normal    11/23/2010 3:28 PM - Alexander Higginbotham MD  negative  Upper gastrointestinal endoscopy (538484017) on 7/17/2009 at 63 Years.  Comments:  11/23/2010 3:30 PM - Alexander Higginbotham MD  gastritis  Esophagogastroduodenoscopy (445705225) on 5/28/2004 at 58 Years.  Comments:  9/12/2014 5:31 PM - Alexander Higginbotham MD  Mild gastritis and incomplete Schatzki's ring  Colonoscopy (690520089) on 5/28/2004 at 58 Years.  Comments:  9/12/2014 5:29 PM - Alexander Higginbotham MD  Normal  Cholecystectomy (86851155) in 2002 at 57 Years.  Spinal fusion (53076705) in 1995 at 50 Years.  Esophagogastroduodenoscopy (1529600909) in 1989 at 44 Years.  Endoscopy with biopsy in the month of 7/1989 at 43 Years.  Right knee arthroscopy.   Social History:        Alcohol Assessment            Current, 1 drinks/episode average.      Tobacco Assessment            Never      Substance Abuse Assessment: Denies Substance Abuse      Employment and Education Assessment            Employed                     Comments:                      12/06/2012 - Alexander Higginbotham MD                     [employer]      Home and Environment Assessment            Marital status:  (Living together).      Exercise and Physical Activity Assessment            Exercise frequency: Exercises but did not indicate how often..        Physical Examination   Vital Signs   8/21/2018 8:37 AM CDT Peripheral Pulse Rate 71 bpm    Systolic Blood Pressure 137 mmHg  HI    Diastolic Blood Pressure 82 mmHg  HI    Mean Arterial Pressure 100 mmHg    BP Site Right arm      Measurements from flowsheet : Measurements   8/21/2018 8:37 AM CDT Height Measured - Standard 70 in    Weight Measured -  Standard 160 lb    BSA 1.89 m2    Body Mass Index 22.96 kg/m2      General:  Alert and oriented, No acute distress.    Eye:  Normal conjunctiva.    HENT:  Normocephalic, Normal hearing.    Respiratory:  Lungs are clear to auscultation, Respirations are non-labored.    Cardiovascular:  Normal rate, Regular rhythm, No murmur, No gallop, Normal peripheral perfusion, No edema.    Musculoskeletal:  Normal gait.    Integumentary:  No pallor.    Neurologic:  No focal deficits.    Cognition and Speech:  Speech clear and coherent, Functional cognition intact.    Psychiatric:  Cooperative, Appropriate mood & affect.       Impression and Plan   Diagnosis     Chronic headache (YVE19-XV R51).     Chronic pain (FSW29-BY G89.29).     Common migraine (DEN52-GY G43.009).     Fibromyalgia (KGX57-BO M79.7).     H/O: iron deficiency anemia (BIO23-KS Z86.2).     Periodic limb movement sleep disorder (CWR27-KS G47.61).     Sleep apnea (GAX23-EB G47.30).     Course:  Good response to treatment.    Patient Instructions:       Counseled: Patient, Diet, Activity, Verbalized understanding, Reviewed the importance of CPAP adherence to reduce daytime sleepiness and prevent excess mortality associated with sleep apnea .    Orders     Orders (Selected)   Outpatient Orders  Ordered  Return to Clinic (Request): RFV: Wellness, Return in June 2018, Instructions: lalb before visit  Return to Clinic (Request): Return in 2 months  Return to Office (Request): RFV: Annual cbc, chem 14, psa, tsh, ferritin. lipids, Return in Annually in June  Prescriptions  Prescribed  Norco 5 mg-325 mg oral tablet: 1 tab(s), po, q4-6 hrs, Instructions: Up to 5 daily., # 150 EA, 0 Refill(s), Type: Maintenance, Pharmacy: [facility] #[id number], 1 tab(s) Oral q4-6 hrs,Instr:Up to 5 daily.  Norco 5 mg-325 mg oral tablet: 1 tab(s), po, q4-6 hrs, Instructions: Up to 5 day. Fill 9/20/18, # 150 EA, 0 Refill(s), Type: Maintenance, Pharmacy: [facility] #[id number], 1 tab(s) Oral q4-6  hrs,Instr:Up to 5 day. Fill 9/20/18.

## 2022-02-15 NOTE — NURSING NOTE
Depression Screening Entered On:  2/22/2021 9:47 AM CST    Performed On:  2/22/2021 9:47 AM CST by Kelly Pittman               Depression Screening   Little Interest - Pleasure in Activities :   Several days   Feeling Down, Depressed, Hopeless :   Several days   Initial Depression Screen Score :   2 Score   Poor Appetite or Overeating :   Not at all   Trouble Falling or Staying Asleep :   Several days   Feeling Tired or Little Energy :   Several days   Trouble Concentrating :   Not at all   Moving or Speaking Slowly :   Not at all   Thoughts Better Off Dead or Hurting Self :   Several days   Difficulty at Work, Home, Getting Along :   Somewhat difficult   Kelly Pittman - 2/22/2021 9:47 AM CST

## 2022-02-15 NOTE — NURSING NOTE
Comprehensive Intake Entered On:  8/15/2019 3:51 PM CDT    Performed On:  8/15/2019 3:48 PM CDT by Elba Bridges CMA               Summary   Chief Complaint :   Pain mgmt f/u.    Weight Measured :   154 lb(Converted to: 154 lb 0 oz, 69.85 kg)    Systolic Blood Pressure :   120 mmHg   Diastolic Blood Pressure :   70 mmHg   Mean Arterial Pressure :   87 mmHg   Peripheral Pulse Rate :   68 bpm   BP Site :   Right arm   Pulse Site :   Radial artery   BP Method :   Manual   HR Method :   Manual   Temperature Tympanic :   97.3 DegF(Converted to: 36.3 DegC)  (LOW)    Race :      Languages :   English   Ethnicity :   Not  or    Elba Bridges CMA - 8/15/2019 3:48 PM CDT   Health Status   Allergies Verified? :   Yes   Medication History Verified? :   Yes   Pre-Visit Planning Status :   Completed   Elba Bridges CMA - 8/15/2019 3:48 PM CDT   Meds / Allergies   (As Of: 8/15/2019 3:51:48 PM CDT)   Allergies (Active)   amitriptyline  Estimated Onset Date:   Unspecified ; Reactions:   Dizziness, Dry mouth ; Created By:   Aliya Marcano; Reaction Status:   Active ; Category:   Drug ; Substance:   amitriptyline ; Type:   Allergy ; Updated By:   Aliya Marcano; Source:   Paper Chart ; Reviewed Date:   6/18/2019 3:48 PM CDT      beta blockers  Estimated Onset Date:   Unspecified ; Reactions:   Reactive airway disease ; Created By:   Alexander Higginbotham MD; Reaction Status:   Active ; Category:   Drug ; Substance:   beta blockers ; Type:   Side Effect ; Updated By:   Alexander Higginbotham MD; Reviewed Date:   6/18/2019 3:48 PM CDT      Indocin  Estimated Onset Date:   <not entered> 4/1/1992 ; Reactions:   memory problems, Disorientation ; Created By:   Aliya Marcano; Reaction Status:   Active ; Category:   Drug ; Substance:   Indocin ; Type:   Allergy ; Updated By:   Aliya Marcano; Source:   Paper Chart ; Reviewed Date:   6/18/2019 3:48 PM CDT      Paxil  Estimated Onset Date:    Unspecified ; Reactions:   Anxious, Trembles ; Created By:   Aliya Marcano; Reaction Status:   Active ; Category:   Drug ; Substance:   Paxil ; Type:   Allergy ; Updated By:   Aliya Marcano; Source:   Paper Chart ; Reviewed Date:   6/18/2019 3:48 PM CDT        Medication List   (As Of: 8/15/2019 3:51:48 PM CDT)   Prescription/Discharge Order    acetaminophen-hydrocodone  :   acetaminophen-hydrocodone ; Status:   Prescribed ; Ordered As Mnemonic:   Norco 5 mg-325 mg oral tablet ; Simple Display Line:   1 tab(s), po, q4-6 hrs, Up to 5 tabs per day. Fill 7 18/2019, 150 EA, 0 Refill(s) ; Ordering Provider:   Alexander Higginbotham MD; Catalog Code:   acetaminophen-hydrocodone ; Order Dt/Tm:   6/18/2019 4:01:26 PM          acetaminophen-hydrocodone  :   acetaminophen-hydrocodone ; Status:   Prescribed ; Ordered As Mnemonic:   Norco 5 mg-325 mg oral tablet ; Simple Display Line:   1 tab(s), po, q4-6 hrs, Up to 5 tabs per day., 150 EA, 0 Refill(s) ; Ordering Provider:   Alexander Higginbotham MD; Catalog Code:   acetaminophen-hydrocodone ; Order Dt/Tm:   6/18/2019 4:00:44 PM          eletriptan  :   eletriptan ; Status:   Prescribed ; Ordered As Mnemonic:   eletriptan 40 mg oral tablet ; Simple Display Line:   40 mg, 1 tab(s), PO, Once, PRN: for migraine headache, 12 tab(s), 5 Refill(s) ; Ordering Provider:   Alexander Higginbotham MD; Catalog Code:   eletriptan ; Order Dt/Tm:   3/19/2019 4:46:49 PM          Miscellaneous Rx Supply  :   Miscellaneous Rx Supply ; Status:   Prescribed ; Ordered As Mnemonic:   Replacment CPAP +9 cm H2o ; Simple Display Line:   See Instructions, Heated humidifier, heated tubing, filters, nasal or full face mask of choice with headgear.  Replacement cushions and supplies as needed.  Change supplies every 6 mos  Months = 99 (Lifetime), 1 EA, 11 Refill(s) ; Ordering Provider:   Alexander Higginbotham MD; Catalog Code:   Miscellaneous Rx Supply ; Order Dt/Tm:   4/13/2017 3:34:47 PM            Home Meds     ferrous sulfate  :   ferrous sulfate ; Status:   Documented ; Ordered As Mnemonic:   ferrous sulfate 325 mg (65 mg elemental iron) oral tablet ; Simple Display Line:   325 mg, 1 tab(s), po, daily, 0 Refill(s) ; Catalog Code:   ferrous sulfate ; Order Dt/Tm:   1/25/2018 4:43:44 PM          triamcinolone nasal  :   triamcinolone nasal ; Status:   Documented ; Ordered As Mnemonic:   Nasacort ; Catalog Code:   triamcinolone nasal ; Order Dt/Tm:   6/17/2014 3:37:34 PM

## 2022-02-15 NOTE — NURSING NOTE
Comprehensive Intake Entered On:  6/1/2021 4:36 PM CDT    Performed On:  6/1/2021 4:27 PM CDT by Aviva Ruffin CMA               Summary   Chief Complaint :   Medication management    Advance Directive :   No   Weight Measured :   158.9 lb(Converted to: 158 lb 14 oz, 72.076 kg)    Height Measured :   70 in(Converted to: 5 ft 10 in, 177.80 cm)    Body Mass Index :   22.8 kg/m2   Body Surface Area :   1.88 m2   Height/Length Estimated :   7 in(Converted to: 0 ft 7 in, 17.78 cm)    Systolic Blood Pressure :   130 mmHg   Diastolic Blood Pressure :   72 mmHg   Mean Arterial Pressure :   91 mmHg   Peripheral Pulse Rate :   82 bpm   BP Site :   Right arm   Pulse Site :   Radial artery   BP Method :   Manual   HR Method :   Manual   Temperature Tympanic :   97.9 DegF(Converted to: 36.6 DegC)    Race :      Languages :   English   Ethnicity :   Not  or    Aviva Ruffin CMA - 6/1/2021 4:27 PM CDT   Health Status   Allergies Verified? :   Yes   Medication History Verified? :   Yes   Medical History Verified? :   No   Pre-Visit Planning Status :   Not completed   Tobacco Use? :   Never smoker   Aviva Ruffin CMA - 6/1/2021 4:27 PM CDT   Consents   Consent for Immunization Exchange :   Consent Granted   Consent for Immunizations to Providers :   Consent Granted   Aviva Ruffin CMA - 6/1/2021 4:27 PM CDT   Meds / Allergies   (As Of: 6/1/2021 4:36:18 PM CDT)   Allergies (Active)   amitriptyline  Estimated Onset Date:   Unspecified ; Reactions:   Dizziness, Dry mouth ; Created By:   Aliya Atkins CMA; Reaction Status:   Active ; Category:   Drug ; Substance:   amitriptyline ; Type:   Allergy ; Updated By:   Aliya Atkins CMA; Source:   Paper Chart ; Reviewed Date:   2/22/2021 7:03 AM CST      beta blockers  Estimated Onset Date:   Unspecified ; Reactions:   Reactive airway disease ; Created By:   Alexander Higginbotham MD; Reaction Status:   Active ; Category:   Drug ; Substance:   beta blockers ;  Type:   Side Effect ; Updated By:   Alexander Higginbotham MD; Reviewed Date:   2/22/2021 7:03 AM CST      Indocin  Estimated Onset Date:   <not entered> 4/1/1992 ; Reactions:   memory problems, Disorientation ; Created By:   Aliya Atkins CMA; Reaction Status:   Active ; Category:   Drug ; Substance:   Indocin ; Type:   Allergy ; Updated By:   Aliya Atkins CMA; Source:   Paper Chart ; Reviewed Date:   2/22/2021 7:03 AM CST      Paxil  Estimated Onset Date:   Unspecified ; Reactions:   Anxious, Trembles ; Created By:   Aliya Atkins CMA; Reaction Status:   Active ; Category:   Drug ; Substance:   Paxil ; Type:   Allergy ; Updated By:   Aliya Atkins CMA; Source:   Paper Chart ; Reviewed Date:   2/22/2021 7:03 AM CST        Medication List   (As Of: 6/1/2021 4:36:18 PM CDT)   Prescription/Discharge Order    acetaminophen-hydrocodone  :   acetaminophen-hydrocodone ; Status:   Prescribed ; Ordered As Mnemonic:   Norco 5 mg-325 mg oral tablet ; Simple Display Line:   1 tab(s), po, q4-6 hrs, Up to 5 tabs per day., 150 EA, 0 Refill(s) ; Ordering Provider:   Alexander Higginbotham MD; Catalog Code:   acetaminophen-hydrocodone ; Order Dt/Tm:   4/13/2021 3:52:30 PM CDT          acetaminophen-hydrocodone  :   acetaminophen-hydrocodone ; Status:   Prescribed ; Ordered As Mnemonic:   Norco 5 mg-325 mg oral tablet ; Simple Display Line:   1 tab(s), po, q4-6 hrs, Up to 5 tabs per day. Fill in one month, 150 EA, 0 Refill(s) ; Ordering Provider:   Alexander Higginbotham MD; Catalog Code:   acetaminophen-hydrocodone ; Order Dt/Tm:   2/22/2021 7:24:36 AM CST          eletriptan  :   eletriptan ; Status:   Prescribed ; Ordered As Mnemonic:   eletriptan 40 mg oral tablet ; Simple Display Line:   See Instructions, TAKE 1 TABLET BY MOUTH 1 TIME AS NEEDED FOR MIGRAINE HEADACHE, 12 tab(s), 4 Refill(s) ; Ordering Provider:   Alexander Higginbotham MD; Catalog Code:   eletriptan ; Order Dt/Tm:   5/4/2021 9:27:40 AM CDT          gabapentin   :   gabapentin ; Status:   Prescribed ; Ordered As Mnemonic:   gabapentin 100 mg oral capsule ; Simple Display Line:   200 mg, 2 cap(s), Oral, tid, 180 cap(s), 2 Refill(s) ; Ordering Provider:   Alexander Higginbotham MD; Catalog Code:   gabapentin ; Order Dt/Tm:   2/23/2021 10:47:56 AM CST          mirtazapine  :   mirtazapine ; Status:   Prescribed ; Ordered As Mnemonic:   mirtazapine 30 mg oral tablet ; Simple Display Line:   30 mg, 1 tab(s), Oral, hs, 90 tab(s), 0 Refill(s) ; Ordering Provider:   Alexander Higginbotham MD; Catalog Code:   mirtazapine ; Order Dt/Tm:   2/23/2021 10:19:04 AM CST          Miscellaneous Rx Supply  :   Miscellaneous Rx Supply ; Status:   Prescribed ; Ordered As Mnemonic:   Replacment CPAP +9 cm H2o ; Simple Display Line:   See Instructions, Heated humidifier, heated tubing, filters, nasal or full face mask of choice with headgear.  Replacement cushions and supplies as needed.  Change supplies every 6 mos  Months = 99 (Lifetime), 1 EA, 11 Refill(s) ; Ordering Provider:   Alexander Higginbotham MD; Catalog Code:   Miscellaneous Rx Supply ; Order Dt/Tm:   4/13/2017 3:34:47 PM CDT          propranolol  :   propranolol ; Status:   Prescribed ; Ordered As Mnemonic:   propranolol 60 mg oral capsule, extended release ; Simple Display Line:   60 mg, 1 cap(s), Oral, daily, 30 cap(s), 11 Refill(s) ; Ordering Provider:   Alexander Higginbotham MD; Catalog Code:   propranolol ; Order Dt/Tm:   12/4/2020 9:44:11 AM CST          traZODone  :   traZODone ; Status:   Prescribed ; Ordered As Mnemonic:   traZODone 50 mg oral tablet ; Simple Display Line:   50 mg, 1 tab(s), Oral, hs, 90 tab(s), 0 Refill(s) ; Ordering Provider:   Alexander Higginbotham MD; Catalog Code:   traZODone ; Order Dt/Tm:   2/23/2021 10:06:02 AM CST            Home Meds    ferrous sulfate  :   ferrous sulfate ; Status:   Documented ; Ordered As Mnemonic:   ferrous sulfate 325 mg (65 mg elemental iron) oral tablet ; Simple Display Line:   325 mg, 1 tab(s), po, daily,  0 Refill(s) ; Catalog Code:   ferrous sulfate ; Order Dt/Tm:   2018 4:43:44 PM CST          traZODone  :   traZODone ; Status:   Suspended ; Ordered As Mnemonic:   traZODone ; Simple Display Line:   Oral, hs, 0 Refill(s) ; Catalog Code:   traZODone ; Order Dt/Tm:   2021 7:05:09 AM CST          triamcinolone nasal  :   triamcinolone nasal ; Status:   Documented ; Ordered As Mnemonic:   Nasacort ; Catalog Code:   triamcinolone nasal ; Order Dt/Tm:   2014 3:37:34 PM CDT            ID Risk Screen   Recent Travel History :   No recent travel   Family Member Travel History :   No recent travel   Other Exposure to Infectious Disease :   Unknown   COVID-19 Testing Status :   No COVID-19 test performed   Aviva Ruffin CMA - 2021 4:27 PM CDT   Social History   Social History   (As Of: 2021 4:36:18 PM CDT)   Alcohol:  Current      Liquor (Hard) (1.5 oz), Daily, 4 drinks/episode average.  6 drinks/episode maximum.   (Last Updated: 3/30/2021 2:25:52 PM CDT by Yuliana Arzola)          Tobacco:  Denies Tobacco Use      Never (less than 100 in lifetime)   (Last Updated: 2020 9:08:25 AM CST by Natalie Rogers)          Electronic Cigarette/Vaping:        Electronic Cigarette Use: Never.   (Last Updated: 2020 9:08:31 AM CST by Natalie Rogers)          Substance Abuse:  Denies Substance Abuse      (Last Updated: 2018 12:36:07 PM CDT by Maria Elena Kidd )         Employment/School:        Retired, Highest education level: High school.   Comments:  2012 4:53 PM - Alexander Higginbotham MD: [facility]   (Last Updated: 3/30/2021 2:23:28 PM CDT by Yuliana Arzola)          Home/Environment:        Marital status: .  Spouse/Partner name: Cari -  2020 from cancer.  Home equipment: CPAP/BiPAP.   (Last Updated: 3/30/2021 2:22:30 PM CDT by Yuliana Arzola)          Exercise:        Exercise frequency: Exercises but did not indicate how often..   (Last Updated: 2018 12:38:28 PM CDT by Bernabe  Maria Elena)          Other:        [place] .   (Last Updated: 3/30/2021 2:23:20 PM CDT by Yuliana Arzola)

## 2022-02-15 NOTE — NURSING NOTE
Comprehensive Intake Entered On:  10/10/2019 4:39 PM CDT    Performed On:  10/10/2019 4:34 PM CDT by Sameera Purvis CMA               Summary   Chief Complaint :   Med check   Weight Measured :   153 lb(Converted to: 153 lb 0 oz, 69.40 kg)    Height Measured :   70 in(Converted to: 5 ft 10 in, 177.80 cm)    Body Mass Index :   21.95 kg/m2   Body Surface Area :   1.85 m2   Systolic Blood Pressure :   158 mmHg (HI)    Diastolic Blood Pressure :   89 mmHg (HI)    Mean Arterial Pressure :   112 mmHg   Peripheral Pulse Rate :   76 bpm   BP Site :   Right arm   BP Method :   Electronic   Temperature Tympanic :   96.7 DegF(Converted to: 35.9 DegC)  (LOW)    Race :      Languages :   English   Ethnicity :   Not  or    Sameera Purvis CMA - 10/10/2019 4:34 PM CDT   Health Status   Allergies Verified? :   Yes   Medication History Verified? :   Yes   Pre-Visit Planning Status :   Completed   Tobacco Use? :   Never smoker   Sameera Purvis CMA - 10/10/2019 4:34 PM CDT   Meds / Allergies   (As Of: 10/10/2019 4:39:14 PM CDT)   Allergies (Active)   amitriptyline  Estimated Onset Date:   Unspecified ; Reactions:   Dizziness, Dry mouth ; Created By:   Aliya Atkins CMA; Reaction Status:   Active ; Category:   Drug ; Substance:   amitriptyline ; Type:   Allergy ; Updated By:   Aliya Atkins CMA; Source:   Paper Chart ; Reviewed Date:   6/18/2019 3:48 PM CDT      beta blockers  Estimated Onset Date:   Unspecified ; Reactions:   Reactive airway disease ; Created By:   Alexander Higginbotham MD; Reaction Status:   Active ; Category:   Drug ; Substance:   beta blockers ; Type:   Side Effect ; Updated By:   Alexander Higginbotham MD; Reviewed Date:   6/18/2019 3:48 PM CDT      Indocin  Estimated Onset Date:   <not entered> 4/1/1992 ; Reactions:   memory problems, Disorientation ; Created By:   Aliya Atkins CMA; Reaction Status:   Active ; Category:   Drug ; Substance:   Indocin ; Type:   Allergy ; Updated By:    Aliya Atkins CMA; Source:   Paper Chart ; Reviewed Date:   6/18/2019 3:48 PM CDT      Paxil  Estimated Onset Date:   Unspecified ; Reactions:   Anxious, Trembles ; Created By:   Aliya Atkins CMA; Reaction Status:   Active ; Category:   Drug ; Substance:   Paxil ; Type:   Allergy ; Updated By:   Aliya Atkins CMA; Source:   Paper Chart ; Reviewed Date:   6/18/2019 3:48 PM CDT        Medication List   (As Of: 10/10/2019 4:39:14 PM CDT)   Prescription/Discharge Order    acetaminophen-hydrocodone  :   acetaminophen-hydrocodone ; Status:   Prescribed ; Ordered As Mnemonic:   Norco 5 mg-325 mg oral tablet ; Simple Display Line:   1 tab(s), po, q4-6 hrs, Up to 5 tabs per day. Fill 9/16/2019, 150 EA, 0 Refill(s) ; Ordering Provider:   Alexander Higginbotham MD; Catalog Code:   acetaminophen-hydrocodone ; Order Dt/Tm:   8/15/2019 4:14:08 PM CDT          acetaminophen-hydrocodone  :   acetaminophen-hydrocodone ; Status:   Prescribed ; Ordered As Mnemonic:   Norco 5 mg-325 mg oral tablet ; Simple Display Line:   1 tab(s), po, q4-6 hrs, Up to 5 tabs per day., 150 EA, 0 Refill(s) ; Ordering Provider:   Alexander Higginbotham MD; Catalog Code:   acetaminophen-hydrocodone ; Order Dt/Tm:   8/15/2019 4:14:01 PM CDT          eletriptan  :   eletriptan ; Status:   Prescribed ; Ordered As Mnemonic:   eletriptan 40 mg oral tablet ; Simple Display Line:   40 mg, 1 tab(s), PO, Once, PRN: for migraine headache, 12 tab(s), 5 Refill(s) ; Ordering Provider:   Alexander Higginbotham MD; Catalog Code:   eletriptan ; Order Dt/Tm:   8/15/2019 4:15:35 PM CDT          Miscellaneous Rx Supply  :   Miscellaneous Rx Supply ; Status:   Prescribed ; Ordered As Mnemonic:   Replacment CPAP +9 cm H2o ; Simple Display Line:   See Instructions, Heated humidifier, heated tubing, filters, nasal or full face mask of choice with headgear.  Replacement cushions and supplies as needed.  Change supplies every 6 mos  Months = 99 (Lifetime), 1 EA, 11 Refill(s) ;  Ordering Provider:   Alexander Higginbotham MD; Catalog Code:   Miscellaneous Rx Supply ; Order Dt/Tm:   4/13/2017 3:34:47 PM CDT            Home Meds    ferrous sulfate  :   ferrous sulfate ; Status:   Documented ; Ordered As Mnemonic:   ferrous sulfate 325 mg (65 mg elemental iron) oral tablet ; Simple Display Line:   325 mg, 1 tab(s), po, daily, 0 Refill(s) ; Catalog Code:   ferrous sulfate ; Order Dt/Tm:   1/25/2018 4:43:44 PM CST          triamcinolone nasal  :   triamcinolone nasal ; Status:   Documented ; Ordered As Mnemonic:   Nasacort ; Catalog Code:   triamcinolone nasal ; Order Dt/Tm:   6/17/2014 3:37:34 PM CDT

## 2022-02-15 NOTE — PROGRESS NOTES
Patient:   PARIS FARMER            MRN: [id number]            FIN: [id number]               Age:   72 years     Sex:  Male     :  1945   Associated Diagnoses:   Fibromyalgia; Common Migraine; H/O: Iron Deficiency Anemia; Sleep apnea; Periodic Limb Movement Sleep Disorder; Glucose intolerance (impaired glucose tolerance)   Author:   Alexander Higginbotham MD      Visit Information   Visit type:  Scheduled follow-up.    Accompanied by:  No one.    Source of history:  Self.    History limitation:  None.       Chief Complaint   3/23/2018 3:30 PM CDT    f/u pain management      History of Present Illness    The patient s pain is well controlled.  He is actually having relatively infrequent migraine at this point.  He has nocturia one to three times per night.  Sometimes he does not put his CPAP back on and will have a worse night and then is more headache prone.  His pain is otherwise well controlled.  I again recommended intravenous iron repletion and a physical; he wishes to do that this summer when school is out as he works as a  and that is a better time for him.  He is really not troubled by his legs at this point.   Has the patients condition changed significantly since their last visit?     no  Has the patient been compliant with treatments, including non-opioid treatments?    yes  Has there been any aberrant opioid behavior since the last visit?     no  Is the total opioid dose less than 90 morphine equivalents?    yes  Is there reasonable progress toward meeting established functional goals?     remains active  Was the last drug test consistent with prescribed medications?     yes  Is another  drug test due at this visit?     no  Has the ePDMP been reviewed?     yes  Is the patient complying with their signed Controlled Substance Abuse Agreement?     yes  Based on all the above, is the patient still a candidate for chronic opioid therapy?       yes         Review of Systems   Constitutional:  No fatigue, No  decreased activity, No weight loss.    Eye:  No recent visual problem.    Respiratory:  No shortness of breath, No sputum production, No wheezing.    Cardiovascular:  No chest pain, No palpitations, No peripheral edema.    Gastrointestinal:  No nausea, No vomiting, No diarrhea, No constipation, No heartburn, No abdominal pain.    Genitourinary:  No hematuria.    Hematology/Lymphatics:  No bruising tendency, No bleeding tendency.    Musculoskeletal:  Muscle pain, No joint pain, No muscle weakness, No gait disturbance, No joint swelling.    Neurologic:  Negative except as documented in history of present illness.    Psychiatric:  No anxiety, No depression.       Health Status   Allergies:    Allergic Reactions (Selected)  Severity Not Documented  Amitriptyline (Dizziness and dry mouth)  Indocin (Memory problems and disorientation)  Paxil (Anxious and trembles)  Nonallergic Reactions (Selected)  Severity Not Documented  Beta blockers (Reactive airway disease)   Medications:  (Selected)   Prescriptions  Prescribed  Norco 5 mg-325 mg oral tablet: 1 tab(s), po, q4-6 hrs, Instructions: Up to 5 daily. Fill 4/22/118, # 150 EA, 0 Refill(s), Type: Maintenance, Pharmacy: [id number], 1 tab(s) po q4-6 hrs,Instr:Up to 5 daily. Fill 4/22/118  Norco 5 mg-325 mg oral tablet: 1 tab(s), po, q4-6 hrs, Instructions: Up to 5 dayl., # 150 EA, 0 Refill(s), Type: Maintenance, Pharmacy: [id number], 1 tab(s) po q4-6 hrs,Instr:Up to 5 dayl.  Relpax 40 mg oral tablet: See Instructions, Instructions: Take 1 tab by mouth once as needed for migraine headache may repeat dose once in 2 hrs, # 12 tab(s), 5 Refill(s), Type: Soft Stop, Pharmacy: [id number], Take 1 tab by mouth once as needed for migraine headach...  Replacment CPAP +9 cm H2o: Replacment CPAP +9 cm H2o, See Instructions, Instructions: Heated humidifier, heated tubing, filters, nasal or full face mask of choice with headgear.  Replacement cushions and  supplies as needed.  Change supplies every 6 mos  Months = 99 (Lifetime),...  Documented Medications  Documented  Nasacort: 0 Refill(s), Type: Maintenance  ferrous sulfate 325 mg (65 mg elemental iron) oral tablet: 1 tab(s) ( 325 mg ), po, daily, 0 Refill(s), Type: Maintenance   Problem list:    All Problems  Acid Reflux / ICD-9-.81 / Confirmed  Benign prostatic hypertrophy (BPH) with nocturia / SNOMED CT 5981215261 / Confirmed  Chronic Headache / ICD-9-.0 / Confirmed  Common Migraine / ICD-9-.10 / Confirmed  Fibromyalgia / SNOMED CT 36804959 / Confirmed  H/O: Iron Deficiency Anemia / ICD-9-CM V12.3 / Confirmed  IBS (Irritable Bowel Syndrome) / ICD-9-.1 / Confirmed  Glucose intolerance (impaired glucose tolerance) / SNOMED CT 64803781 / Confirmed  NSAID-Induced Gastric Ulcer / ICD-9-.90 / Confirmed  Periodic Limb Movement Sleep Disorder / ICD-9-.51 / Confirmed  Rhinitis, chronic / ICD-9-.0 / Confirmed  Sleep apnea / ICD-9-.09 / Confirmed  Varicose veins / SNOMED CT 457823936 / Confirmed  Resolved: *Hospitalized@[facility] - Acute bronchitis  Resolved: *Hospitalized@[facility] - NSAID gastropathy  Resolved: Depression with anxiety / ICD-9-.4  Canceled: Common Migraine / ICD-9-.10      Histories   Past Medical History:    Active  Sleep apnea (786.09): Onset on 11/22/2004 at 59 years.  Comments:  9/12/2014 CDT 5:28 PM CDT - Aelxander Higginbotham MD  [facility]: RDI 30.1 with oximetry james 63%. 4/1/2005 adequate CPAP titration to 8. Optimal CPAP titration to 9 at [facility] 10/30/2009.  Chronic Headache (784.0)  Acid Reflux (530.81)  IBS (Irritable Bowel Syndrome) (564.1)  Fibromyalgia (14199625)  Periodic Limb Movement Sleep Disorder (327.51)  Common Migraine (346.10)  H/O: Iron Deficiency Anemia (V12.3)  Resolved  *Hospitalized@[facility] - Acute bronchitis: Onset on 3/7/2012 at 66 years.  Resolved.  *Hospitalized@[facility] - NSAID gastropathy: Onset on 7/25/2011 at  65 years.  Resolved.  Depression with anxiety (300.4):  Resolved.   Family History:    Colitis  Sister  [place] - Cancer  Mother     Procedure history:    Capsule endoscopy ([id number]) on 12/30/2009 at 64 Years.  Comments:  11/23/2010 3:28 PM - Alexander Higginbotham MD  negative  Colonoscopy ([id number]) on 12/3/2009 at 64 Years.  Comments:  9/12/2014 5:22 PM Alexander Osborne MD  Normal    11/23/2010 3:28 PM - Alexander Higginbotham MD  negative  Upper gastrointestinal endoscopy ([id number]) on 7/17/2009 at 63 Years.  Comments:  11/23/2010 3:30 PM Alexander Osborne MD  gastritis  Esophagogastroduodenoscopy ([id number]) on 5/28/2004 at 58 Years.  Comments:  9/12/2014 5:31 PM - Alexander Higginbotham MD  Mild gastritis and incomplete Schatzki's ring  Colonoscopy ([id number]) on 5/28/2004 at 58 Years.  Comments:  9/12/2014 5:29 PM - Alexander Higginbotham MD  Normal  Cholecystectomy ([id number]) in 2002 at 57 Years.  Spinal fusion ([id number]) in 1995 at 50 Years.  Esophagogastroduodenoscopy ([id number]) in 1989 at 44 Years.  Endoscopy with biopsy in the month of 7/1989 at 43 Years.  Right knee arthroscopy.   Social History:        Alcohol Assessment: Current      Tobacco Assessment            Never      Employment and Education Assessment            Employed                     Comments:                      12/06/2012 - Alexander Higginbotham MD                     [facility]      Home and Environment Assessment            Marital status:  (Living together).        Physical Examination   Vital Signs   3/23/2018 3:30 PM CDT Temperature Tympanic 97.2 DegF  LOW    Peripheral Pulse Rate 88 bpm    Systolic Blood Pressure 120 mmHg    Diastolic Blood Pressure 74 mmHg    Mean Arterial Pressure 89 mmHg      Measurements from flowsheet : Measurements   3/23/2018 3:30 PM CDT    Weight Measured - Standard                165 lb     General:  Alert and oriented, No acute distress.    Eye:  Normal conjunctiva.    HENT:  Normocephalic, Normal hearing.    Musculoskeletal:   Normal gait.    Integumentary:  No pallor.    Cognition and Speech:  Speech clear and coherent, Functional cognition intact.    Psychiatric:  Cooperative, Appropriate mood & affect.       Impression and Plan   Diagnosis     Fibromyalgia (MTP87-GY M79.7).     Common Migraine (BBI86-TJ G43.009).     Course:  Good response to treatment.    Patient Instructions:       Counseled: Patient, Diet, Activity, Verbalized understanding.    Orders     Orders (Selected)   Outpatient Orders  Ordered  Return to Clinic (Request): RFV: Pain, Return in 2 months  Return to Clinic (Request): RFV: Wellness, Return in June 2018  Prescriptions  Prescribed  Norco 5 mg-325 mg oral tablet: 1 tab(s), po, q4-6 hrs, Instructions: Up to 5 daily. Fill 4/22/118, # 150 EA, 0 Refill(s), Type: Maintenance, Pharmacy: [id number], 1 tab(s) po q4-6 hrs,Instr:Up to 5 daily. Fill 4/22/118  Norco 5 mg-325 mg oral tablet: 1 tab(s), po, q4-6 hrs, Instructions: Up to 5 dayl., # 150 EA, 0 Refill(s), Type: Maintenance, Pharmacy: [id number], 1 tab(s) po q4-6 hrs,Instr:Up to 5 dayl..     Diagnosis     H/O: Iron Deficiency Anemia (FIQ87-RO Z86.2).     Course:  Unchanged.    Plan:  65 mg iron daily.    Patient Instructions:       Counseled: Patient, Regarding medications, Diet, Verbalized understanding.    Diagnosis     Sleep apnea (FZB43-KW G47.30).     Periodic Limb Movement Sleep Disorder (SFY54-BO G47.61).     Patient Instructions:       Counseled: Patient, Verbalized understanding, Reviewed the importance of CPAP adherence to reduce daytime sleepiness and prevent excess mortality associated with sleep apnea .    Orders     Device download.     Diagnosis     Glucose intolerance (impaired glucose tolerance) (WQB43-FF R73.02).     Course:  Unchanged.    Patient Instructions:       Counseled: Patient, Diet, Activity, Verbalized understanding.

## 2022-07-20 RX ORDER — MIRTAZAPINE 45 MG/1
45 TABLET, FILM COATED ORAL AT BEDTIME
Qty: 90 TABLET | Refills: 0 | OUTPATIENT
Start: 2022-07-20

## 2023-10-23 NOTE — PROGRESS NOTES
Chief Complaint  cymbalta did not work, in need of new cpap equipment  History of Present Illness  Fer had some problems tolerating Cymbalta including nausea and dizziness. ?He needs a prescription for replacement CPAP equipment. ?He uses his CPAP every night.  Review of Systems  No dyspnea chest pain abdominal pain weight loss fever pain in headache pattern unchanged.  Problem List/Past Medical History  Ongoing  Acid Reflux  Benign prostatic hypertrophy (BPH) with nocturia  Chronic Headache  Common Migraine  Fibromyalgia  H/O: Iron Deficiency Anemia  IBS (Irritable Bowel Syndrome)  NSAID-Induced Gastric Ulcer  Periodic Limb Movement Sleep Disorder  Rhinitis, chronic  Sleep apnea  Varicose veins  Resolved  *[email or web address] - Acute bronchitis  *[email or web address] - NSAID gastropathy  Depression with anxiety  Procedure/Surgical History  Capsule endoscopy (12/30/2009),  Colonoscopy (12/03/2009),  Upper gastrointestinal endoscopy (07/17/2009),  Colonoscopy (05/28/2004),  Esophagogastroduodenoscopy (05/28/2004),  Cholecystectomy (2002),  Spinal fusion (1995),  Endoscopy with biopsy (07/1989),  Esophagogastroduodenoscopy (1989),  Right knee arthroscopy.  Home Medications  acetaminophen-hydrocodone 325 mg-5 mg oral tablet, 1 tab(s), po, q4-6 hrs, PRN  DULoxetine 30 mg oral delayed release capsule, 30 mg, 1 cap(s), po, daily, 5 refills  Nasacort  Norco 5 mg-325 mg oral tablet, 1 tab(s), po, q4-6 hrs  Relpax 40 mg oral tablet, 40 mg, 1 tab(s), po, once, PRN, 5 refills  Replacment CPAP +9 cm H2o, 11 refills  Slow Fe 160 mg oral tablet, extended release, 160 mg, 1 tab(s), po, bid  Allergies  Indocin?(Disorientation, memory problems)  Paxil?(Anxious, Trembles)  amitriptyline?(Dizziness, Dry mouth)  beta blockers?(Reactive airway disease)  Social History  Employment and Education  Employed  Home and Environment  Marital status:  (Living together).  Family History  CA - Cancer: Mother.  Colitis:  History  Chief Complaint   Patient presents with    Chest Pain     Pt reports chest tightness with back pain that started around 6pm. Also reports abdominal pain x3days and leg pain started yesterday. 40 yo F with PMH of anxiety, asthma, DVT presents to ED with CP, abd pain, leg pain and back pain. States the abd pain started first, LUQ, feels like a baby is kicking her. Nausea, no vomiting. Diarrhea - recently finished abx for dental infection, not sure which abx. No urinary sx. Leg pain is left leg, started 3 or so days ago. No trauma. No a/a factors. Able to ambulate w/out issue. Chest pain started a day or so ago. No a/a factors. No numbness/tingling/weakness. Tolerating po - but only softs because she has had issues w/ chronic globus sensation for the past several months and feels like she is choking if she eats anything but yogurt/ensure etc. Has seen PCP/GI for this, was told everything was normal. No fevers/chills. Mild chronic cough - she is a smoker. No ETOH/drug use since she was 18 yrs old. No fevers/chills. No SOB.        History provided by:  Medical records and patient   used: No    Chest Pain  Pain location:  Substernal area  Pain quality: tightness    Pain radiates to:  Epigastrium  Pain radiates to the back: yes    Pain severity:  Moderate  Onset quality:  Gradual  Duration:  2 days  Timing:  Constant  Progression:  Unchanged  Chronicity:  New  Relieved by:  None tried  Worsened by:  Nothing tried  Ineffective treatments:  None tried  Associated symptoms: back pain and nausea    Associated symptoms: no abdominal pain, no AICD problem, no altered mental status, no anorexia, no anxiety, no claudication, no cough, no diaphoresis, no dizziness, no dysphagia, no fatigue, no fever, no headache, no heartburn, no lower extremity edema, no near-syncope, no numbness, no orthopnea, no palpitations, no PND, no shortness of breath, no syncope, not vomiting and no weakness    Risk factors: Sister.  Immunizations  Physical Exam  Vitals & Measurements  HR:?73?(Peripheral)?  BP:?133/85?  HT:?70?in?  WT:?162?lb?  BMI:?23.24?  Patient appears comfortable. ?Alert and oriented.  Lab Results  Diagnostic Results  Assessment/Plan  Common Migraine  Unchanged.  Fibromyalgia  Unchanged. ?I encouraged him strongly to resume the Cymbalta and give it a good try. ?Refilled hydrocodone for additional month he will follow-up when he next needs a refill.  Sleep apnea  Using his equipment every night.  Orders:  acetaminophen-hydrocodone, 1 tab(s), PO, q4-6 hrs, PRN: as needed for pain, # 150 EA, 0 Refill(s), Type: Maintenance, Pharmacy: [id number], Fill 5/26/17, 1 tab(s) po q4-6 hrs,PRN:as needed for pain  Miscellaneous Rx Supply, Replacment CPAP +9 cm H2o, See Instructions, Instructions: Heated humidifier, heated tubing, filters, nasal or full face mask of choice with headgear. Replacement cushions and supplies as needed. Change supplies every 6 mos Months = 99 (Lifetime),...   prior DVT/PE and smoking        Prior to Admission Medications   Prescriptions Last Dose Informant Patient Reported? Taking?   acetaminophen (TYLENOL) 325 mg tablet   No No   Sig: Take 2 tablets (650 mg total) by mouth every 6 (six) hours as needed for mild pain or headaches   baclofen 10 mg tablet   No No   Sig: Take 1 tablet (10 mg total) by mouth every 8 (eight) hours as needed for muscle spasms (jaw spasm) Crush tablet into apple sauce or pudding.    calcium carbonate (TUMS) 500 mg chewable tablet   Yes No   Sig: Chew 1 tablet daily as needed for indigestion or heartburn   cetirizine (ZyrTEC) 10 mg tablet   Yes No   Sig: Take 10 mg by mouth daily   famotidine (PEPCID) 20 mg tablet   No No   Sig: Take 1 tablet (20 mg total) by mouth 2 (two) times a day for 7 days   ibuprofen (MOTRIN) 200 mg tablet   No No   Sig: Take 3 tablets (600 mg total) by mouth every 6 (six) hours as needed for moderate pain   medroxyPROGESTERone (DEPO-PROVERA) 150 mg/mL injection   No No   Sig: Inject 1 mL (150 mg total) into a muscle every 3 (three) months      Facility-Administered Medications: None       Past Medical History:   Diagnosis Date    Abnormal Pap smear of cervix     HGSIL 2016    Anesthesia     "woke up with 4 procedures/lithotripsy(coded)/bronchoscopy/ cyst removal and wisdom teeth " "also high anxiety"    Anxiety     panic attack on occas/gets worked up    Asthma     "controlled'    Cut of upper extremity     arms and legs from landscaping yest 9/11/pt to notify Dr Mitchell Vanegas office today    Cyst in neck at birth     Dental crowns present     DVT (deep vein thrombosis) in pregnancy     1999 in right arm and 2008 left leg    Environmental and seasonal allergies     Hemorrhoids during pregnancy     History of kidney stones     History of palpitations     "with anxiety"    HPV (human papilloma virus) infection     Hx of blood clots     during 2 pregnancies 1999/ and 2008/no longer on lovenox    Intrahepatic cholestasis of pregnancy 5/22/2019    Low back pain     occas    Lumbar herniated disc     Shortness of breath     sometimes    Thyroid cyst     Varicella     as child    Wears glasses     Wears partial dentures     upper       Past Surgical History:   Procedure Laterality Date    BRONCHOSCOPY      CERVICAL BIOPSY  W/ LOOP ELECTRODE EXCISION  2016    CYST REMOVAL      LITHOTRIPSY      pf both kidneys, stents placed    TX LAPAROSCOPY W/RMVL ADNEXAL STRUCTURES Bilateral 9/13/2019    Procedure: SALPINGECTOMY, LAPAROSCOPIC;  Surgeon: Amparo Pa MD;  Location: [facility];  Service: Gynecology    TONSILECTOMY AND ADNOIDECTOMY      WISDOM TOOTH EXTRACTION         Family History   Problem Relation Age of Onset    Leukemia Mother     No Known Problems Father     No Known Problems Sister      I have reviewed and agree with the history as documented. E-Cigarette/Vaping    E-Cigarette Use Never User      E-Cigarette/Vaping Substances    Nicotine No     THC No     CBD No     Flavoring No     Other No     Unknown No      Social History     Tobacco Use    Smoking status: Every Day     Packs/day: 0.50     Types: Cigarettes    Smokeless tobacco: Never   Vaping Use    Vaping Use: Never used   Substance Use Topics    Alcohol use: Yes    Drug use: No       Review of Systems   Constitutional:  Negative for appetite change, chills, diaphoresis, fatigue and fever. HENT:  Negative for congestion, ear pain, rhinorrhea, sore throat, trouble swallowing and voice change. Eyes:  Negative for pain and visual disturbance. Respiratory:  Negative for cough, chest tightness and shortness of breath. Cardiovascular:  Positive for chest pain. Negative for palpitations, orthopnea, claudication, leg swelling, syncope, PND and near-syncope. Gastrointestinal:  Positive for nausea. Negative for abdominal pain, anorexia, blood in stool, constipation, diarrhea, heartburn and vomiting.    Genitourinary:  Negative for difficulty urinating and hematuria. Musculoskeletal:  Positive for back pain. Negative for neck pain and neck stiffness. Skin:  Negative for rash. Neurological:  Negative for dizziness, syncope, speech difficulty, weakness, light-headedness, numbness and headaches. Psychiatric/Behavioral:  Negative for confusion and suicidal ideas. Physical Exam  Physical Exam  Vitals and nursing note reviewed. Constitutional:       General: She is not in acute distress. Appearance: Normal appearance. She is well-developed. She is not ill-appearing, toxic-appearing or diaphoretic. HENT:      Head: Normocephalic and atraumatic. Right Ear: External ear normal.      Left Ear: External ear normal.      Nose: Nose normal.   Eyes:      General: No scleral icterus. Right eye: No discharge. Left eye: No discharge. Extraocular Movements: Extraocular movements intact. Conjunctiva/sclera: Conjunctivae normal.      Pupils: Pupils are equal, round, and reactive to light. Neck:      Trachea: No tracheal deviation. Cardiovascular:      Rate and Rhythm: Normal rate and regular rhythm. Pulses: Normal pulses. Heart sounds: Normal heart sounds. No murmur heard. No friction rub. No gallop. Pulmonary:      Effort: Pulmonary effort is normal. No respiratory distress. Breath sounds: Normal breath sounds. No stridor. Chest:      Chest wall: No tenderness. Abdominal:      General: Bowel sounds are normal.      Palpations: Abdomen is soft. Tenderness: There is no abdominal tenderness. There is no right CVA tenderness, left CVA tenderness, guarding or rebound. Musculoskeletal:         General: No tenderness or deformity. Normal range of motion. Cervical back: Normal range of motion and neck supple. No tenderness. Right lower leg: No tenderness. No edema. Left lower leg: No tenderness. No edema. Lymphadenopathy:      Cervical: No cervical adenopathy.    Skin:     General: Skin is warm and dry. Findings: No bruising or rash. Neurological:      General: No focal deficit present. Mental Status: She is alert and oriented to person, place, and time. Cranial Nerves: No cranial nerve deficit. Sensory: No sensory deficit. Motor: No weakness. Coordination: Coordination normal.   Psychiatric:         Mood and Affect: Mood is anxious.          Behavior: Behavior normal.         Vital Signs  ED Triage Vitals [10/22/23 2221]   Temperature Pulse Respirations Blood Pressure SpO2   97.8 °F (36.6 °C) 95 18 118/83 100 %      Temp Source Heart Rate Source Patient Position - Orthostatic VS BP Location FiO2 (%)   Temporal Monitor Lying Right arm --      Pain Score       6           Vitals:    10/22/23 2221 10/23/23 0030   BP: 118/83 95/57   Pulse: 95 82   Patient Position - Orthostatic VS: Lying          Visual Acuity      ED Medications  Medications   sodium chloride 0.9 % bolus 1,000 mL (1,000 mL Intravenous New Bag 10/22/23 2243)   ketorolac (TORADOL) injection 15 mg (15 mg Intravenous Given 10/22/23 2258)   ondansetron (ZOFRAN) injection 4 mg (4 mg Intravenous Given 10/22/23 2259)   LORazepam (ATIVAN) injection 0.5 mg (0.5 mg Intravenous Given 10/22/23 2334)       Diagnostic Studies  Results Reviewed       Procedure Component Value Units Date/Time    UA w Reflex to Microscopic w Reflex to Culture [[id number]] Collected: 10/22/23 2302    Lab Status: Final result Specimen: Urine, Clean Catch Updated: 10/22/23 2312     Color, UA Yellow     Clarity, UA Clear     Specific Gravity, UA 1.010     pH, UA 7.0     Leukocytes, UA Negative     Nitrite, UA Negative     Protein, UA Negative mg/dl      Glucose, UA Negative mg/dl      Ketones, UA Negative mg/dl      Urobilinogen, UA <2.0 mg/dl      Bilirubin, UA Negative     Occult Blood, UA Negative    HS Troponin 0hr (reflex protocol) [[id number]]  (Normal) Collected: 10/22/23 2241    Lab Status: Final result Specimen: Blood from Arm, Right Updated: 10/22/23 2310     hs TnI 0hr <2 ng/L     B-Type Natriuretic Peptide(BNP) [[id number]]  (Normal) Collected: 10/22/23 2241    Lab Status: Final result Specimen: Blood from Arm, Right Updated: 10/22/23 2309     BNP 16 pg/mL     Comprehensive metabolic panel [[id number]]  (Abnormal) Collected: 10/22/23 2241    Lab Status: Final result Specimen: Blood from Arm, Right Updated: 10/22/23 2306     Sodium 140 mmol/L      Potassium 3.5 mmol/L      Chloride 102 mmol/L      CO2 30 mmol/L      ANION GAP 8 mmol/L      BUN 6 mg/dL      Creatinine 0.64 mg/dL      Glucose 101 mg/dL      Calcium 9.6 mg/dL      AST 9 U/L      ALT 8 U/L      Alkaline Phosphatase 44 U/L      Total Protein 7.1 g/dL      Albumin 4.2 g/dL      Total Bilirubin 0.19 mg/dL      eGFR 109 ml/min/1.73sq m     Narrative:      [place] guidelines for Chronic Kidney Disease (CKD):     Stage 1 with normal or high GFR (GFR > 90 mL/min/1.73 square meters)    Stage 2 Mild CKD (GFR = 60-89 mL/min/1.73 square meters)    Stage 3A Moderate CKD (GFR = 45-59 mL/min/1.73 square meters)    Stage 3B Moderate CKD (GFR = 30-44 mL/min/1.73 square meters)    Stage 4 Severe CKD (GFR = 15-29 mL/min/1.73 square meters)    Stage 5 End Stage CKD (GFR <15 mL/min/1.73 square meters)  Note: GFR calculation is accurate only with a steady state creatinine    Lipase [[id number]]  (Normal) Collected: 10/22/23 2241    Lab Status: Final result Specimen: Blood from Arm, Right Updated: 10/22/23 2306     Lipase 22 u/L     D-Dimer [[id number]]  (Normal) Collected: 10/22/23 2241    Lab Status: Final result Specimen: Blood from Arm, Right Updated: 10/22/23 2304     D-Dimer, Quant 0.32 ug/ml FEU     Protime-INR [[id number]]  (Normal) Collected: 10/22/23 2241    Lab Status: Final result Specimen: Blood from Arm, Right Updated: 10/22/23 2302     Protime 14.0 seconds      INR 1.03    APTT [[id number]]  (Normal) Collected: 10/22/23 2241    Lab Status: Final result Specimen: Blood from Arm, Right Updated: 10/22/23 2302     PTT 30 seconds     POCT pregnancy, urine [[id number]]  (Normal) Resulted: 10/22/23 2257    Lab Status: Final result Updated: 10/22/23 2302     EXT Preg Test, Ur Negative     Control Valid    CBC and differential [[id number]]  (Abnormal) Collected: 10/22/23 2241    Lab Status: Final result Specimen: Blood from Arm, Right Updated: 10/22/23 2248     WBC 14.60 Thousand/uL      RBC 4.89 Million/uL      Hemoglobin 13.5 g/dL      Hematocrit 41.2 %      MCV 84 fL      MCH 27.6 pg      MCHC 32.8 g/dL      RDW 12.3 %      MPV 10.4 fL      Platelets 725 Thousands/uL      nRBC 0 /100 WBCs      Neutrophils Relative 63 %      Immat GRANS % 0 %      Lymphocytes Relative 25 %      Monocytes Relative 5 %      Eosinophils Relative 6 %      Basophils Relative 1 %      Neutrophils Absolute 9.30 Thousands/µL      Immature Grans Absolute 0.05 Thousand/uL      Lymphocytes Absolute 3.59 Thousands/µL      Monocytes Absolute 0.68 Thousand/µL      Eosinophils Absolute 0.83 Thousand/µL      Basophils Absolute 0.15 Thousands/µL                    CT chest abdomen pelvis wo contrast   Final Result by Román Denson DO (10/23 2440)      No evidence of acute pathology. Workstation performed: [id number]         XR chest 1 view portable   ED Interpretation by Mattie Gonzalez MD (10/22 2238)   No acute abnormality.                  Procedures  ECG 12 Lead Documentation Only    Date/Time: 10/22/2023 10:27 PM    Performed by: Mattie Gonzalez MD  Authorized by: Mattie Gonzalez MD    Indications / Diagnosis:  Cp  ECG reviewed by me, the ED Provider: yes    Patient location:  ED  Previous ECG:     Previous ECG:  Compared to current    Similarity:  No change    Comparison to cardiac monitor: Yes    Interpretation:     Interpretation: normal    Rate:     ECG rate:  90    ECG rate assessment: normal    Rhythm:     Rhythm: sinus rhythm    Ectopy:     Ectopy: none    QRS:     QRS axis:  Normal    QRS intervals:  Normal  Conduction:     Conduction: normal    ST segments:     ST segments:  Normal  T waves:     T waves: normal             ED Course  ED Course as of 10/23/23 0054   Sun Oct 22, 2023   2314 Workup benign, unclear etiology of sx. Dimer neg. Prior DVTs were provoked. Pt with contrast allergy, will check dry scan. WBC elevated - however has been elevated last labs as well. 2330 Pt anxious to go to CT, ativan ordered, she has a ride. Mon Oct 23, 2023   0050 Workup benign. CP ongoing for over a day - no need to trend trop. Low HEART score. No arrythmia on tele, EKG unchanged from prior. No sign of pericarditis/myocarditis. Dimer neg. Will d/c home, recommend motrin/tylenol prn, plenty of fluids, RTED if sx worsen, otherwise f/u with PCP. HEART Risk Score      Flowsheet Row Most Recent Value   Heart Score Risk Calculator    History 0 Filed at: 10/23/2023 0051   ECG 1 Filed at: 10/23/2023 0051   Age 0 Filed at: 10/23/2023 0051   Risk Factors 0 Filed at: 10/23/2023 0051   Troponin 0 Filed at: 10/23/2023 0051   HEART Score 1 Filed at: 10/23/2023 0051                          SBIRT 20yo+      Flowsheet Row Most Recent Value   Initial Alcohol Screen: US AUDIT-C     1. How often do you have a drink containing alcohol? 0 Filed at: 10/22/2023 2220   2. How many drinks containing alcohol do you have on a typical day you are drinking? 0 Filed at: 10/22/2023 2220   3a. Male UNDER 65: How often do you have five or more drinks on one occasion? 0 Filed at: 10/22/2023 2220   3b. FEMALE Any Age, or MALE 65+: How often do you have 4 or more drinks on one occassion? 0 Filed at: 10/22/2023 2220   Audit-C Score 0 Filed at: 10/22/2023 2220   CLEMENTE: How many times in the past year have you. .. Used an illegal drug or used a prescription medication for non-medical reasons?  Never Filed at: 10/22/2023 2220                      Medical Decision Making  Problems Addressed:  Chest pain with low risk of acute coronary syndrome: acute illness or injury  Leukocytosis: chronic illness or injury    Amount and/or Complexity of Data Reviewed  Labs: ordered. Decision-making details documented in ED Course. Radiology: ordered and independent interpretation performed. Decision-making details documented in ED Course. ECG/medicine tests: ordered and independent interpretation performed. Decision-making details documented in ED Course. Risk  Prescription drug management. Disposition  Final diagnoses:   Chest pain with low risk of acute coronary syndrome   Leukocytosis     Time reflects when diagnosis was documented in both MDM as applicable and the Disposition within this note       Time User Action Codes Description Comment    10/23/2023 12:52 AM Edd Easley S Add [R07.9] Chest pain with low risk of acute coronary syndrome     10/23/2023 12:52 AM [place] Bodily Add [P54.952] Leukocytosis           ED Disposition       ED Disposition   Discharge    Condition   Stable    Date/Time   Mon Oct 23, 2023 8[facility] discharge to home/self care.                    Follow-up Information       Follow up With Specialties Details Why Contact Info Additional Information     [address] [facility] Emergency Department Emergency Medicine  If symptoms worsen [address] [facility] [phone]  [address] [facility] Emergency Department, [address] [facility], [facility] ER, [address]    [address] [facility] Cardiology [facility] Cardiology Schedule an appointment as soon as possible for a visit  As needed [address] [facility] [id number]  [address]  Cardiology [facility], [address] \[facility] [place] [facility]\"" [address] [facility], [place], [place], [facility] [address]  Schedule an appointment as soon as possible for a visit  If you would like to schedule an appointment with a therapist. Attention [facility]  [facility] [address]  [phone]       [employer]  Call  to establish care with a primary doctor if you don't already have one. [phone]               Patient's Medications   Discharge Prescriptions    No medications on file       No discharge procedures on file.     PDMP Review       None            ED Provider  Electronically Signed by             Robby Stein MD  10/23/23 4208